# Patient Record
Sex: MALE | Race: WHITE | NOT HISPANIC OR LATINO | Employment: UNEMPLOYED | URBAN - METROPOLITAN AREA
[De-identification: names, ages, dates, MRNs, and addresses within clinical notes are randomized per-mention and may not be internally consistent; named-entity substitution may affect disease eponyms.]

---

## 2017-01-16 ENCOUNTER — ALLSCRIPTS OFFICE VISIT (OUTPATIENT)
Dept: OTHER | Facility: OTHER | Age: 37
End: 2017-01-16

## 2017-05-01 DIAGNOSIS — E66.01 MORBID (SEVERE) OBESITY DUE TO EXCESS CALORIES (HCC): ICD-10-CM

## 2017-05-01 DIAGNOSIS — E78.5 HYPERLIPIDEMIA: ICD-10-CM

## 2017-05-01 DIAGNOSIS — I10 ESSENTIAL (PRIMARY) HYPERTENSION: ICD-10-CM

## 2017-05-01 DIAGNOSIS — F41.9 ANXIETY DISORDER: ICD-10-CM

## 2017-05-02 ENCOUNTER — LAB CONVERSION - ENCOUNTER (OUTPATIENT)
Dept: OTHER | Facility: OTHER | Age: 37
End: 2017-05-02

## 2017-05-02 ENCOUNTER — GENERIC CONVERSION - ENCOUNTER (OUTPATIENT)
Dept: OTHER | Facility: OTHER | Age: 37
End: 2017-05-02

## 2017-05-02 LAB
A/G RATIO (HISTORICAL): 1.4 (CALC) (ref 1–2.5)
ALBUMIN SERPL BCP-MCNC: 4.5 G/DL (ref 3.6–5.1)
ALP SERPL-CCNC: 46 U/L (ref 40–115)
ALT SERPL W P-5'-P-CCNC: 18 U/L (ref 9–46)
AST SERPL W P-5'-P-CCNC: 18 U/L (ref 10–40)
BILIRUB SERPL-MCNC: 0.5 MG/DL (ref 0.2–1.2)
BUN SERPL-MCNC: 14 MG/DL (ref 7–25)
BUN/CREA RATIO (HISTORICAL): NORMAL (CALC) (ref 6–22)
CALCIUM SERPL-MCNC: 9.4 MG/DL (ref 8.6–10.3)
CHLORIDE SERPL-SCNC: 100 MMOL/L (ref 98–110)
CHOLEST SERPL-MCNC: 134 MG/DL (ref 125–200)
CHOLEST/HDLC SERPL: 4.8 (CALC)
CO2 SERPL-SCNC: 27 MMOL/L (ref 20–31)
CREAT SERPL-MCNC: 0.75 MG/DL (ref 0.6–1.35)
DEPRECATED RDW RBC AUTO: 14.2 % (ref 11–15)
EGFR AFRICAN AMERICAN (HISTORICAL): 135 ML/MIN/1.73M2
EGFR-AMERICAN CALC (HISTORICAL): 116 ML/MIN/1.73M2
GAMMA GLOBULIN (HISTORICAL): 3.2 G/DL (CALC) (ref 1.9–3.7)
GLUCOSE (HISTORICAL): 83 MG/DL (ref 65–99)
HCT VFR BLD AUTO: 45.8 % (ref 38.5–50)
HDLC SERPL-MCNC: 28 MG/DL
HGB BLD-MCNC: 14.9 G/DL (ref 13.2–17.1)
LDL CHOLESTEROL (HISTORICAL): 57 MG/DL (CALC)
MCH RBC QN AUTO: 27.9 PG (ref 27–33)
MCHC RBC AUTO-ENTMCNC: 32.5 G/DL (ref 32–36)
MCV RBC AUTO: 86.1 FL (ref 80–100)
NON-HDL-CHOL (CHOL-HDL) (HISTORICAL): 106 MG/DL (CALC)
PLATELET # BLD AUTO: 233 THOUSAND/UL (ref 140–400)
PMV BLD AUTO: 8.8 FL (ref 7.5–12.5)
POTASSIUM SERPL-SCNC: 4 MMOL/L (ref 3.5–5.3)
RBC # BLD AUTO: 5.32 MILLION/UL (ref 4.2–5.8)
SODIUM SERPL-SCNC: 136 MMOL/L (ref 135–146)
TOTAL PROTEIN (HISTORICAL): 7.7 G/DL (ref 6.1–8.1)
TRIGL SERPL-MCNC: 247 MG/DL
TSH SERPL DL<=0.05 MIU/L-ACNC: 1.31 MIU/L (ref 0.4–4.5)
WBC # BLD AUTO: 11.3 THOUSAND/UL (ref 3.8–10.8)

## 2017-07-31 ENCOUNTER — ALLSCRIPTS OFFICE VISIT (OUTPATIENT)
Dept: OTHER | Facility: OTHER | Age: 37
End: 2017-07-31

## 2017-11-10 ENCOUNTER — ALLSCRIPTS OFFICE VISIT (OUTPATIENT)
Dept: OTHER | Facility: OTHER | Age: 37
End: 2017-11-10

## 2017-11-10 ENCOUNTER — GENERIC CONVERSION - ENCOUNTER (OUTPATIENT)
Dept: OTHER | Facility: OTHER | Age: 37
End: 2017-11-10

## 2017-11-10 DIAGNOSIS — M45.3 ANKYLOSING SPONDYLITIS OF CERVICOTHORACIC REGION (HCC): ICD-10-CM

## 2017-11-10 DIAGNOSIS — M79.10 MYALGIA: ICD-10-CM

## 2017-11-10 DIAGNOSIS — M43.6 TORTICOLLIS: ICD-10-CM

## 2017-11-17 ENCOUNTER — LAB CONVERSION - ENCOUNTER (OUTPATIENT)
Dept: OTHER | Facility: OTHER | Age: 37
End: 2017-11-17

## 2017-11-17 LAB
25(OH)D3 SERPL-MCNC: 11 NG/ML (ref 30–100)
ALDOLASE (HISTORICAL): 5.5 U/L
ANA PATTERN 1 (HISTORICAL): ABNORMAL
ANA TITER 1 (HISTORICAL): ABNORMAL TITER
ANTI-NUCLEAR ANTIBODY (ANA) (HISTORICAL): POSITIVE
C-REACTIVE PROTEIN (HISTORICAL): 21.4 MG/L
CK SERPL-CCNC: 197 U/L (ref 44–196)
ERYTHROCYTE SEDIMENTATION RATE (HISTORICAL): 22 MM/H
HLA B27 (HISTORICAL): POSITIVE
LYME IGG/IGM AB (HISTORICAL): <0.9 INDEX
RHEUMATOID FACTOR (HISTORICAL): <14 IU/ML

## 2017-11-21 ENCOUNTER — GENERIC CONVERSION - ENCOUNTER (OUTPATIENT)
Dept: OTHER | Facility: OTHER | Age: 37
End: 2017-11-21

## 2017-11-22 ENCOUNTER — GENERIC CONVERSION - ENCOUNTER (OUTPATIENT)
Dept: OTHER | Facility: OTHER | Age: 37
End: 2017-11-22

## 2017-11-25 ENCOUNTER — APPOINTMENT (OUTPATIENT)
Dept: RADIOLOGY | Facility: MEDICAL CENTER | Age: 37
End: 2017-11-25
Payer: COMMERCIAL

## 2017-11-25 DIAGNOSIS — M43.6 TORTICOLLIS: ICD-10-CM

## 2017-11-25 PROCEDURE — 72110 X-RAY EXAM L-2 SPINE 4/>VWS: CPT

## 2017-11-29 ENCOUNTER — GENERIC CONVERSION - ENCOUNTER (OUTPATIENT)
Dept: OTHER | Facility: OTHER | Age: 37
End: 2017-11-29

## 2017-12-05 ENCOUNTER — ALLSCRIPTS OFFICE VISIT (OUTPATIENT)
Dept: OTHER | Facility: OTHER | Age: 37
End: 2017-12-05

## 2017-12-14 ENCOUNTER — ALLSCRIPTS OFFICE VISIT (OUTPATIENT)
Dept: OTHER | Facility: OTHER | Age: 37
End: 2017-12-14

## 2018-01-10 NOTE — MISCELLANEOUS
To Whom It May Concern:    Please excuse Isidoro Gottron from jury duty for the next six months  He has chronic anxiety issues and has also been dealing with neck problems as well  Please contact me with any questions  Electronically signed by:Viral CASEY    Mar 21 2016 11:14AM EST

## 2018-01-12 VITALS
WEIGHT: 315 LBS | DIASTOLIC BLOOD PRESSURE: 88 MMHG | SYSTOLIC BLOOD PRESSURE: 148 MMHG | OXYGEN SATURATION: 99 % | HEART RATE: 115 BPM | TEMPERATURE: 97.4 F | BODY MASS INDEX: 52.75 KG/M2

## 2018-01-12 NOTE — MISCELLANEOUS
Message  Called pt  at 1402 hrs   has Vit D 0f 11 will start on Rx Vit D   has yet top get x rays but will do this weekend   he is to keep fu 1st week Dec       Plan  Avitaminosis D    · Vitamin D (Ergocalciferol) 51558 UNIT Oral Capsule; one weekly till finished    Signatures   Electronically signed by : Mikaela Robledo DO; Nov 22 2017  2:08PM EST                       (Author)

## 2018-01-12 NOTE — RESULT NOTES
Verified Results  (Q) CBC (H/H, RBC, INDICES, WBC, PLT) [id number] 07:02AM Bimal Isai     Test Name Result Flag Reference   WHITE BLOOD CELL COUNT 10 4 Thousand/uL  3 8-10 8   RED BLOOD CELL COUNT 5 26 Million/uL  4 20-5 80   HEMOGLOBIN 14 9 g/dL  13 2-17 1   HEMATOCRIT 46 4 %  38 5-50 0   MCV 88 3 fL  80 0-100 0   MCH 28 4 pg  27 0-33 0   MCHC 32 2 g/dL  32 0-36 0   RDW 14 0 %  11 0-15 0   PLATELET COUNT 464 Thousand/uL  140-400   MPV 8 5 fL  7 5-11 5     (1) COMPREHENSIVE METABOLIC PANEL [id number] 10:76SI Goleta Isai     Test Name Result Flag Reference   GLUCOSE 85 mg/dL  65-99   Fasting reference interval   UREA NITROGEN (BUN) 17 mg/dL  7-25   CREATININE 0 71 mg/dL  0 60-1 35   eGFR NON-AFR  AMERICAN 120 mL/min/1 73m2  > OR = 60   eGFR AFRICAN AMERICAN 139 mL/min/1 73m2  > OR = 60   BUN/CREATININE RATIO   5-67   NOT APPLICABLE (calc)   SODIUM 137 mmol/L  135-146   POTASSIUM 4 5 mmol/L  3 5-5 3   CHLORIDE 100 mmol/L     CARBON DIOXIDE 28 mmol/L  20-31   CALCIUM 9 7 mg/dL  8 6-10 3   PROTEIN, TOTAL 7 7 g/dL  6 1-8 1   ALBUMIN 4 6 g/dL  3 6-5 1   GLOBULIN 3 1 g/dL (calc)  1 9-3 7   ALBUMIN/GLOBULIN RATIO 1 5 (calc)  1 0-2 5   BILIRUBIN, TOTAL 0 5 mg/dL  0 2-1 2   ALKALINE PHOSPHATASE 36 U/L L    AST 19 U/L  10-40   ALT 21 U/L  9-46     (Q) LIPID PANEL WITH REFLEX TO DIRECT LDL [id number] 07:02AM Goleta Isai     Test Name Result Flag Reference   CHOLESTEROL, TOTAL 143 mg/dL  125-200   HDL CHOLESTEROL 33 mg/dL L > OR = 40   TRIGLICERIDES 787 mg/dL H <150   LDL-CHOLESTEROL 72 mg/dL (calc)  <130   Desirable range <100 mg/dL for patients with CHD or  diabetes and <70 mg/dL for diabetic patients with  known heart disease  CHOL/HDLC RATIO 4 3 (calc)  < OR = 5 0   NON HDL CHOLESTEROL 110 mg/dL (calc)     Target for non-HDL cholesterol is 30 mg/dL higher than   LDL cholesterol target       (Q) TSH, 3RD GENERATION W/REFLEX TO FT4 [id number] 07:02AM Bimal Alex     Test Name Result Flag Reference   TSH W/REFLEX TO FT4 2 55 mIU/L  0 40-4 50     (Q) URINALYSIS REFLEX [id number] 07:02AM Juan Bogdan   REPORT COMMENT:  FASTING:YES     Test Name Result Flag Reference   COLOR YELLOW  YELLOW   APPEARANCE CLEAR  CLEAR   SPECIFIC GRAVITY 1 025  1 001-1 035   PH 6 0  5 0-8 0   GLUCOSE NEGATIVE  NEGATIVE   BILIRUBIN NEGATIVE  NEGATIVE   KETONES NEGATIVE  NEGATIVE   OCCULT BLOOD 1+ A NEGATIVE   PROTEIN NEGATIVE  NEGATIVE   NITRITE NEGATIVE  NEGATIVE   LEUKOCYTE ESTERASE NEGATIVE  NEGATIVE   WBC 0-5 /HPF  < OR = 5   RBC 0-2 /HPF  < OR = 2   SQUAMOUS EPITHELIAL CELLS 0-5 /HPF  < OR = 5   BACTERIA NONE SEEN /HPF  NONE SEEN   HYALINE CAST NONE SEEN /LPF  NONE SEEN       Discussion/Summary   We'll discuss at your appointment   Dr Vina Fleischer

## 2018-01-14 VITALS
OXYGEN SATURATION: 97 % | DIASTOLIC BLOOD PRESSURE: 90 MMHG | BODY MASS INDEX: 52.66 KG/M2 | SYSTOLIC BLOOD PRESSURE: 158 MMHG | TEMPERATURE: 99.9 F | HEART RATE: 102 BPM | WEIGHT: 315 LBS

## 2018-01-15 NOTE — RESULT NOTES
Verified Results  (1) ALDOLASE [id number] 09:15AM Anup Bella     Test Name Result Flag Reference   ALDOLASE 5 5 U/L  < OR = 8 1     (1) CK (CPK) [id number] 09:15AM Anup Bella     Test Name Result Flag Reference   CREATINE KINASE, TOTAL 197 U/L H      (1) C-REACTIVE PROTEIN [id number] 09:15AM Anup Bella     Test Name Result Flag Reference   C-REACTIVE PROTEIN 21 4 mg/L H <8 0     (1) HLA B27 [id number] 09:15AM Anup Bella     Test Name Result Flag Reference   HLA-B27 ANTIGEN POSITIVE A NEGATIVE     (Q) SED RATE BY MODIFIED Martinez Tyler [id number] 09:15AM Anup Bella     Test Name Result Flag Reference   SED RATE BY MODIFIED$WESTERGREN 22 mm/h H < OR = 15     (Q) LYME DISEASE AB, TOTAL W/REFL WB (IGG, IGM) [id number] 09:15AM Anup Bella     Test Name Result Flag Reference   LYME AB SCREEN <0 90 index     Index                Interpretation                     -----                --------------                     < 0 90               Negative                     0  90-1 09            Equivocal                     > 1 09               Positive      As recommended by the Food and Drug Administration   (FDA), all samples with positive or equivocal   results in a Borrelia burgdorferi antibody screen  will be tested using a blot method  Positive or   equivocal screening test results should not be   interpreted as truly positive until verified as such   using a supplemental assay (e g , B  burgdorferi blot)  The screening test and/or blot for B  burgdorferi   antibodies may be falsely negative in early stages  of Lyme disease, including the period when erythema   migrans is apparent  (Q) NIECY SCREEN, IFA, WITH REFLEX TO TITER AND PATTERN [id number] 09:15AM Anup Bella     Test Name Result Flag Reference   NIECY SCREEN, IFA POSITIVE A NEGATIVE   NIECY IFA is a first line screen for detecting the  presence of up to approximately 150 autoantibodies in  various autoimmune diseases  A positive NIECY IFA result  is suggestive of autoimmune disease and reflexes to  titer and pattern  Further laboratory testing may be  considered if clinically indicated  Visit Physician FAQs for interpretation of all  antibodies in the [place], prevalence, and association  with diseases at http://[employer]/  [email or web address]   NIECY PATTERN NUCLEOLAR A    Nucleolar pattern is associated with systemic  sclerosis (scleroderma), systemic sclerosis/  polymyositis overlap and Sjogren's syndrome  NIECY TITER 1:40 titer H    A low level NIECY titer may be present in pre-clinical  autoimmune diseases and normal individuals  Reference Range                  <1:40        Negative                  1:40-1:80    Low Antibody Level                  >1:80        Elevated Antibody Level     (1) RF QUANTITATION [id number] 09:15AM [employer] Stephani Breaker     Test Name Result Flag Reference   RHEUMATOID FACTOR <14 IU/mL  <14     *(Q) VITAMIN D, 25-HYDROXY, LC/MS/MS [id number] 09:15AM [employer], Stephani Breaker     Test Name Result Flag Reference   VITAMIN D, 25-OH, TOTAL 11 ng/mL L    Vitamin D Status         25-OH Vitamin D:     Deficiency:                    <20 ng/mL  Insufficiency:             20 - 29 ng/mL  Optimal:                 > or = 30 ng/mL     For 25-OH Vitamin D testing on patients on   D2-supplementation and patients for whom quantitation   of D2 and D3 fractions is required, the QuestAssureD(TM)  25-OH VIT D, (D2,D3), LC/MS/MS is recommended: order   code 22645 (patients >2yrs)  For more information on this test, go to:  http://[employer]/faq/NLG619  (This link is being provided for   informational/educational purposes only )

## 2018-01-15 NOTE — RESULT NOTES
Verified Results  * XR SPINE LUMBAR MINIMUM 4 VIEWS NON INJURY [id number] 09:23AM Delon Yun Order Number: [id number]     Test Name Result Flag Reference   XR SPINE LUMBAR MINIMUM 4 VIEWS (Report)     LUMBAR SPINE     INDICATION: Neck pain and torticollis  COMPARISON: None     VIEWS: AP, lateral, bilateral oblique and coned down projections     IMAGES: 6     FINDINGS:     Image quality is limited due to the patient's size  Alignment is unremarkable  There is no radiographic evidence of acute fracture or destructive osseous lesion  No significant lumbar degenerative change noted  Visualized soft tissues appear unremarkable  IMPRESSION:     Unremarkable lumbar spine series         Workstation performed: [id number]     Signed by:   Jose Eduardo Sorensen MD   11/29/17

## 2018-01-16 NOTE — RESULT NOTES
Verified Results  (1) COMPREHENSIVE METABOLIC PANEL [id number] 21:69OQ Maxi Maddox     Test Name Result Flag Reference   GLUCOSE 83 mg/dL  65-99   Fasting reference interval   UREA NITROGEN (BUN) 14 mg/dL  7-25   CREATININE 0 75 mg/dL  0 60-1 35   eGFR NON-AFR  AMERICAN 116 mL/min/1 73m2  > OR = 60   eGFR AFRICAN AMERICAN 135 mL/min/1 73m2  > OR = 60   BUN/CREATININE RATIO   0-37   NOT APPLICABLE (calc)   SODIUM 136 mmol/L  135-146   POTASSIUM 4 0 mmol/L  3 5-5 3   CHLORIDE 100 mmol/L     CARBON DIOXIDE 27 mmol/L  20-31   CALCIUM 9 4 mg/dL  8 6-10 3   PROTEIN, TOTAL 7 7 g/dL  6 1-8 1   ALBUMIN 4 5 g/dL  3 6-5 1   GLOBULIN 3 2 g/dL (calc)  1 9-3 7   ALBUMIN/GLOBULIN RATIO 1 4 (calc)  1 0-2 5   BILIRUBIN, TOTAL 0 5 mg/dL  0 2-1 2   ALKALINE PHOSPHATASE 46 U/L     AST 18 U/L  10-40   ALT 18 U/L  9-46     (1) LIPID PANEL, FASTING [id number] 07:05AM Kelly Christian     Test Name Result Flag Reference   CHOLESTEROL, TOTAL 134 mg/dL  125-200   HDL CHOLESTEROL 28 mg/dL L > OR = 40   TRIGLICERIDES 287 mg/dL H <150   LDL-CHOLESTEROL 57 mg/dL (calc)  <130   Desirable range <100 mg/dL for patients with CHD or  diabetes and <70 mg/dL for diabetic patients with  known heart disease  CHOL/HDLC RATIO 4 8 (calc)  < OR = 5 0   NON HDL CHOLESTEROL 106 mg/dL (calc)     Target for non-HDL cholesterol is 30 mg/dL higher than   LDL cholesterol target       (Q) CBC (H/H, RBC, INDICES, WBC, PLT) [id number] 07:05AM Kelly Christian     Test Name Result Flag Reference   WHITE BLOOD CELL COUNT 11 3 Thousand/uL H 3 8-10 8   RED BLOOD CELL COUNT 5 32 Million/uL  4 20-5 80   HEMOGLOBIN 14 9 g/dL  13 2-17 1   HEMATOCRIT 45 8 %  38 5-50 0   MCV 86 1 fL  80 0-100 0   MCH 27 9 pg  27 0-33 0   MCHC 32 5 g/dL  32 0-36 0   RDW 14 2 %  11 0-15 0   PLATELET COUNT 212 Thousand/uL  140-400   MPV 8 8 fL  7 5-12 5     (Q) TSH, 3RD GENERATION W/REFLEX TO FT4 [id number] 07:05AM Kelly Christian   REPORT COMMENT:  FASTING:YES     Test Name Result Flag Reference   TSH W/REFLEX TO FT4 1 31 mIU/L  0 40-4 50

## 2018-01-22 VITALS
DIASTOLIC BLOOD PRESSURE: 80 MMHG | HEART RATE: 88 BPM | SYSTOLIC BLOOD PRESSURE: 150 MMHG | WEIGHT: 315 LBS | HEIGHT: 70 IN | BODY MASS INDEX: 45.1 KG/M2

## 2018-01-22 VITALS
SYSTOLIC BLOOD PRESSURE: 162 MMHG | WEIGHT: 315 LBS | TEMPERATURE: 100.1 F | DIASTOLIC BLOOD PRESSURE: 90 MMHG | OXYGEN SATURATION: 100 % | BODY MASS INDEX: 51.13 KG/M2 | HEART RATE: 78 BPM

## 2018-01-23 VITALS
SYSTOLIC BLOOD PRESSURE: 150 MMHG | HEART RATE: 88 BPM | BODY MASS INDEX: 45.1 KG/M2 | HEIGHT: 70 IN | DIASTOLIC BLOOD PRESSURE: 70 MMHG | WEIGHT: 315 LBS

## 2018-01-23 NOTE — MISCELLANEOUS
RE: Davida Adhikari,  1980      To whom it may concern,    I am writing on behalf of my patient, Davida Adhikari, and his request for [employer] services  Liseth Brody is a pleasant young man who suffers from severe  depression, ankylosing spondylitis, severe chronic neck pain, and torticollis  He has been under my care for the last 15 months for these conditions  Unfortunately, Liseth Brody has failed to successfully addressed these issues despite seeing multiple specialists  Inability to move his neck has severely compromised his functional status and makes him a good candidate for your services  Lastly, Mr Ta Elizabeth needs your services in order to attend his medical appointments to address his chronic issues  Without had  this transportation to and from his appointments, his health sure to suffer  Please contact me if you have any additional questions or concerns  Thank you in advance for your consideration in this matter        Sincerely,           Nessa Ponce DO      Electronically signed Eulogio Granda DO  Dec 15 2017  4:02PM EST

## 2018-01-23 NOTE — CONSULTS
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)     Possible true torticollis ( a movement D/O) vs disuse atrophy with adaptive shortening of platysma, SCMs and postural dysfunction of C/T spine  Father has AS so eval for spondyloarthropathy  Plan  Ankylosing spondylitis of cervicothoracic region    · (1) HLA B27; Status:Active; Requested for:10Nov2017; 1   Contracture of neck    · (1) ALDOLASE; Status:Active; Requested for:10Nov2017;    · (1) NIECY SCREEN W/REFLEX TO TITER/PATTERN; Status:Active; Requested  for:10Nov2017;    · (1) CK (CPK); Status:Active; Requested for:10Nov2017;    · (1) C-REACTIVE PROTEIN; Status:Active; Requested for:10Nov2017;    · (1) LYME ANTIBODY PROFILE W/REFLEX TO WESTERN BLOT; Status:Active; Requested  for:10Nov2017;    · (1) RHEUMATOID FACTOR SCREEN; Status:Active; Requested for:10Nov2017;    · (1) SED RATE; Status:Active; Requested for:10Nov2017;    · * XR SPINE LUMBAR MINIMUM 4 VIEWS NON INJURY; Status:Active; Requested  for:10Nov2017;    · Follow-up visit in 3 weeks Evaluation and Treatment  Follow-up  Status: Hold For -  Scheduling  Requested for: [id number]  Myalgia    · (1) VITAMIN D 25-HYDROXY; Status:Active; Requested for:10Nov2017;      1 Amended By: Kelvin Todd; Nov 10 2017 11:05 AM EST    Discussion/Summary  The patient has the current Goals: Increase neck movement  The patent has the current Barriers: Pending further evaluation  Patient is able to Self-Care  Patient agrees and allows to involve family/caregiver in development of care plan:   Impression: neck pain  Currently, the condition is unchanged  The diagnostic plan includes cervical spine x-rays and blood tsts  There are no changes in medication management   Treatment plan includes Hold off on physical therapy for now until tests received  Patient discussion: discussed with the patient  Chief Complaint  11/10/17 PCP consult for torticollis  History of Present Illness  11/10/17 IE: 44 yo male with 4 years of neck pain referred by PCP this past July  He states he is here for "alternatives" to PTx as he is fearful of injury   had IE once Aug 2016   neck was "not touched" and did not have a painful experience  Onset 2013 (see intake for "approx " time frame) Relates in bed can move head to left but cannot when sitting upright   "never" moves head to right  Last imaging 2013  Annmariee Nissen Karrie Nissen plain films   has never seen Neurology or had Botox   had trialed cyclobenzaprine and D/Cd due to "not feeling right" and "aggressive" Has not worked since 2003 (age 23) mostly watches TV   finished a BA in History  Has worked at odd jobs  He does NOT drive  Lives with father  Does NOT describe significant jaja but more dull ache or stiffness    Karrie Nissen he relates onset to moving mother out 2013  No prominent LBP  Review of Systems    Constitutional: recent 27 # / year  lb weight gain, but no recent weight loss  Eyes: No complaints of red eyes, no eyesight problems  Cardiovascular: No complaints of chest pain, no palpitations, no leg claudication or lower extremity edema  Respiratory: No complaints of shortness of breath, no wheezing, no cough  Gastrointestinal: No complaints of abdominal pain, no constipation, no nausea or vomiting, no diarrhea or bloody stools  Musculoskeletal: as noted in HPI  Neurological: no numbness and no tingling  Psychiatric: no meds and anxiety  Active Problems   1  Acute non-recurrent pansinusitis (461 8) (J01 40)  2  Allergic rhinitis (477 9) (J30 9)  3  Anxiety disorder (300 00) (F41 9)  4  Benign essential hypertension (401 1) (I10)  5  Cervicalgia (723 1) (M54 2)  6  Hyperlipidemia (272 4) (E78 5)  7  Morbid or severe obesity due to excess calories (278 01) (E66 01)  8  Torticollis (723 5) (M43 6)    Past Medical History   1  History of Abdominal pain, suprapubic (789 09) (R10 2)  2  History of abnormal weight loss (V13 89) (Z87 898)  3  History of acute sinusitis (V12 69) (Z87 09)  4  History of esophageal ulcer (V12 79) (Z87 19)  5  History of Microscopic hematuria (599 72) (R31 29)  6  History of Urethral Stenosis (598 9)    The active problems and past medical history were reviewed and updated today  Surgical History   1  History of Cholecystectomy Laparoscopic  2  History of Cystoscopy For Urethral Stricture    The surgical history was reviewed and updated today  Family History  Mother   1  Family history of Diabetes Mellitus (V18 0)  Father   2  Family history of Ankylosing Spondylitis  3  Family history of Diabetes Mellitus (V18 0)  4  Family history of Nephrolithiasis  Maternal Grandmother   5  Family history of Congestive Heart Failure  6  Family history of Hypertension (V17 49)  Maternal Grandfather   7  Family history of Prostate Cancer (V16 42)    The family history was reviewed and updated today  Social History    · Denied: History of Alcohol Use (History)   · Educational Level - Completed Bachelors Degree   · Former smoker (V15 82) (N01 336)   · Living With Parents   · Marital History - Single   · Unemployed  The social history was reviewed and is unchanged  Current Meds  1  Cyclobenzaprine HCl - 5 MG Oral Tablet; TAKE 1 TABLET 3 TIMES DAILY AS NEEDED; Therapy: 22GBR6315 to (Benoit Ced)  Requested for: 54IRW4106; Last   Rx:16Jan2017 Ordered  2  Lisinopril 20 MG Oral Tablet; take 1 tablet by mouth once daily; Therapy: 55Fcu8193 to (Evaluate:29Mar2018)  Requested for: 03Apr2017; Last   Rx:03Apr2017 Ordered  3  Omeprazole 20 MG Oral Capsule Delayed Release; take 1 capsule by mouth once daily; Therapy: 65ECL6183 to (Last Rx:03Apr2017)  Requested for: 03Apr2017 Ordered    The medication list was reviewed and updated today  Allergies   1  No Known Drug Allergies    Vitals  Signs   Recorded: 19ELA4475 10:26AM   Heart Rate: 88  Systolic: 315  Diastolic: 80  Height: 5 ft 10 in  Weight: 381 lb   BMI Calculated: 54 67  BSA Calculated: 2 74    Physical Exam      Thoracic Spine: Spinal alignment exhibits abnormal kyphosis  Increased kyphosis  Cervical Spine:   Special Tests:   AROM of c spine is nill in all [place]  Constitutional - General appearance: Abnormal  morbidly obese and obese, posterior pelvic tilt posture and head held flexed and turned left 25 degrees  Musculoskeletal - Gait and station: Abnormal  Gait evaluation demonstrated pect tightening, stooped posture  Neurologic - Cranial nerves: Normal  Reflexes: Abnormal  Deep tendon reflexes: 1+ right biceps, 1+ left biceps, 1+ right triceps, 1+ left triceps, 1+ left brachioradialis, 0 right patella, 0 left patella, 0 right ankle jerk, 0 left ankle jerk and Cabrales's negative  no ankle clonus on the right and no ankle clonus on the left  Eyes   Pupils and irises: Normal        Results/Data  I personally reviewed the films/images/results in the office today  My interpretation follows  X-ray Review 2013 images unremarkable except for loss of lordosis  Diagnostic Review TSH normal, no other relevant labs        Provider Comments    Father has ANKYLOSING SPONDYLITIS1        1 Amended By: Parveen Jeter; Nov 10 2017 11:05 AM EST    Future Appointments    Date/Time Provider Specialty Site   12/06/2017 09:30 AM Lamberto Kidd DO Family Medicine [facility]     Signatures   Electronically signed by : Jeferson Perez DO; Nov 10 2017 11:04AM EST                       (Author)    Electronically signed by : Jeferson Perez DO; Nov 10 2017 11:06AM EST                       (Author)    Electronically signed by : Jeferson Perez DO; Nov 10 2017 11:09AM EST                       (Author)

## 2018-01-23 NOTE — PROGRESS NOTES
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)   4  Myalgia (729 1) (M79 1)   5  Somatic dysfunction of cervical region (739 1) (M99 01)   6  NIECY positive (795 79) (R76 8)   7  CRP elevated (790 95) (R79 82)   8  HLA B27 (HLA B27 positive) (V84 89) (Z15 89)    Plan  CRP elevated, HLA B27 (HLA B27 positive)    · 2 - Hermes Rausch MD  (Rheumatology) Co-Management  Eval and treat  Status: Hold  For - Scheduling  Requested for: 65UOD1400  Care Summary provided  : Yes  HLA B27 (HLA B27 positive)    · Follow-up visit in 6 weeks Evaluation and Treatment  Follow-up  Status: Hold For -  Scheduling  Requested for: 29QWL5508    Discussion/Summary    Finish all eight Vit D doses Rheumatology consultation  The patient has the current Goals: Move neck  The patent has the current Barriers: Transportation  Patient is able to Self-Care  Patient agrees and allows to involve family/caregiver in development of care plan:      Chief Complaint  11/10/17 PCP consult for torticollis  12/5 fu for neck pain/ stiffness      History of Present Illness  11/10/17 IE: 42 yo male with 4 years of neck pain referred by PCP this past July  He states he is here for "alternatives" to PTx as he is fearful of injury   had IE once Aug 2016   neck was "not touched" and did not have a painful experience  Onset 2013 (see intake for "approx " time frame) Relates in bed can move head to left but cannot when sitting upright   "never" moves head to right  Last imaging 2013  Shelvy Mingle Shelvy Mingle plain films   has never seen Neurology or had Botox   had trialed cyclobenzaprine and D/Cd due to "not feeling right" and "aggressive" Has not worked since 2003 (age 23) mostly watches TV   finished a BA in History  Has worked at odd jobs  He does NOT drive  Lives with father  Does NOT describe significant pain but more dull ache or stiffness    Shelvy Mingle he relates onset to moving mother out 2013   No prominent LBP    12/5/17 fu after testing   has been started on Rx Vit D 11/22   today reports some "burning " in left posterior head  He still wants to avoid PTx  Review of Systems    Musculoskeletal: as noted in HPI  Psychiatric: feels tense and anxiety  Active Problems    1  Acute non-recurrent pansinusitis (461 8) (J01 40)   2  Allergic rhinitis (477 9) (J30 9)   3  Ankylosing spondylitis of cervicothoracic region (720 0) (M45 3)   4  Anxiety disorder (300 00) (F41 9)   5  Avitaminosis D (268 9) (E55 9)   6  Benign essential hypertension (401 1) (I10)   7  Cervicalgia (723 1) (M54 2)   8  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)   9  Hyperlipidemia (272 4) (E78 5)   10  Morbid or severe obesity due to excess calories (278 01) (E66 01)   11  Myalgia (729 1) (M79 1)   12  Somatic dysfunction of cervical region (739 1) (M99 01)   13  Torticollis (723 5) (M43 6)    Past Medical History    1  History of Abdominal pain, suprapubic (789 09) (R10 2)   2  History of abnormal weight loss (V13 89) (Z87 898)   3  History of acute sinusitis (V12 69) (Z87 09)   4  History of esophageal ulcer (V12 79) (Z87 19)   5  History of gastroesophageal reflux (GERD) (V12 79) (Z87 19)   6  History of hypertension (V12 59) (Z86 79)   7  History of Microscopic hematuria (599 72) (R31 29)   8  History of Urethral Stenosis (598 9)    The active problems and past medical history were reviewed and updated today  Surgical History    1  History of Cholecystectomy Laparoscopic   2  History of Cystoscopy For Urethral Stricture    The surgical history was reviewed and updated today  Family History  Mother    1  Family history of Diabetes Mellitus (V18 0)  Father    2  Family history of Ankylosing Spondylitis   3  Family history of Diabetes Mellitus (V18 0)   4  Family history of Nephrolithiasis  Maternal Grandmother    5  Family history of Congestive Heart Failure   6  Family history of Hypertension (V17 49)  Maternal Grandfather    7  Family history of Prostate Cancer (V16 42)    Social History    · Denied: History of Alcohol Use (History)   · Denied: History of Drug use   · Educational Level - Completed Bachelors Degree   · Former smoker (V15 82) (R51 080)   · Living With Parents   · Marital History - Single   · Unemployed  The social history was reviewed and is unchanged  Current Meds   1  Cyclobenzaprine HCl - 5 MG Oral Tablet; TAKE 1 TABLET 3 TIMES DAILY AS NEEDED; Therapy: 58JCK5718 to (Ramy Butt)  Requested for: 50RCX1853; Last   Rx:16Jan2017 Ordered   2  Lisinopril 20 MG Oral Tablet; take 1 tablet by mouth once daily; Therapy: 75Srp0917 to (Evaluate:29Mar2018)  Requested for: 03Apr2017; Last   Rx:03Apr2017 Ordered   3  Omeprazole 20 MG Oral Capsule Delayed Release; take 1 capsule by mouth once daily; Therapy: 54WPY0206 to (Last Rx:03Apr2017)  Requested for: 03Apr2017 Ordered   4  Vitamin D (Ergocalciferol) 10892 UNIT Oral Capsule; one weekly till finished; Therapy: 34BBE9273 to (Last Rx:22Nov2017)  Requested for: 22Nov2017 Ordered    The medication list was reviewed and updated today  Allergies    1  No Known Drug Allergies    Vitals  Signs   Recorded: 34YSQ9734 10:55AM   Heart Rate: 88  Systolic: 970  Diastolic: 70  Height: 5 ft 10 in  Weight: 384 lb   BMI Calculated: 55 1  BSA Calculated: 2 75    Physical Exam    Constitutional   General appearance: Abnormal   morbidly obese  Cervical Spine examination demonstrates  AROM of cervcical spine is about 5 degrees in flexion and extensio, RR and LR nil with eye movement only  Some TTP of traps, SCMs  , scalenes  Cervical Spine:   Evaluation of Muscle Stretch Reflexes on the right side demonstrates 1/4 Triceps Reflex, 1/4 Biceps Reflex, 1/4 Brachioradialis Reflex, 0/4 Knee Jerk Reflex and 0/4 Ankle Jerk Reflex, but negative Cabrales's Test right upper extremity     Evaluation of Muscle Stretch Reflexes on the left side demonstrates 1/4 Triceps Reflex, 1/4 Biceps Reflex, 1/4 Brachioradialis Reflex and 0/4 Knee Jerk Reflex, but negative Cabrales's Test left upper extremity  Results/Data  I personally reviewed the films/images/results in the office today  My interpretation follows  X-ray Review lumbar spine unremarkable  Diagnostic Review HLA B-27 +,  h, ESR 22,   Aldolase, Lyme, RF  all Neg  NIECY + 1:40 nucleolar  CRP 21 4 (obesity ?)  Provider Comments    Despite + labs this may not be a true "dystonic torticollis" or manifestation of AS but a neuropsychiatric phenomenon  Despite above his elevated markers deserve Rheumatology evaluation        Future Appointments    Date/Time Provider Specialty Site   12/19/2017 11:00 AM Yael Cabrera DO Family Medicine [facility]     Signatures   Electronically signed by : Octavio Pan DO; Dec  5 2017 11:24AM EST                       (Author)

## 2018-03-08 DIAGNOSIS — I10 ESSENTIAL HYPERTENSION: Primary | ICD-10-CM

## 2018-03-08 RX ORDER — LISINOPRIL 40 MG/1
40 TABLET ORAL DAILY
Qty: 30 TABLET | Refills: 3 | Status: SHIPPED | OUTPATIENT
Start: 2018-03-08

## 2018-03-08 RX ORDER — LISINOPRIL 40 MG/1
1 TABLET ORAL DAILY
COMMUNITY
Start: 2011-08-08 | End: 2018-03-08 | Stop reason: SDUPTHER

## 2018-03-26 DIAGNOSIS — K21.9 GASTROESOPHAGEAL REFLUX DISEASE WITHOUT ESOPHAGITIS: Primary | ICD-10-CM

## 2018-03-26 RX ORDER — OMEPRAZOLE 20 MG/1
1 CAPSULE, DELAYED RELEASE ORAL DAILY
COMMUNITY
Start: 2011-01-10 | End: 2018-03-26 | Stop reason: SDUPTHER

## 2018-03-27 RX ORDER — OMEPRAZOLE 20 MG/1
20 CAPSULE, DELAYED RELEASE ORAL DAILY
Qty: 30 CAPSULE | Refills: 1 | Status: SHIPPED | OUTPATIENT
Start: 2018-03-27 | End: 2018-05-25 | Stop reason: SDUPTHER

## 2018-05-25 DIAGNOSIS — K21.9 GASTROESOPHAGEAL REFLUX DISEASE WITHOUT ESOPHAGITIS: ICD-10-CM

## 2018-05-29 RX ORDER — OMEPRAZOLE 20 MG/1
20 CAPSULE, DELAYED RELEASE ORAL DAILY
Qty: 30 CAPSULE | Refills: 0 | Status: SHIPPED | OUTPATIENT
Start: 2018-05-29 | End: 2018-05-31 | Stop reason: SDUPTHER

## 2018-05-31 DIAGNOSIS — K21.9 GASTROESOPHAGEAL REFLUX DISEASE WITHOUT ESOPHAGITIS: ICD-10-CM

## 2018-05-31 RX ORDER — OMEPRAZOLE 20 MG/1
20 CAPSULE, DELAYED RELEASE ORAL DAILY
Qty: 30 CAPSULE | Refills: 3 | Status: SHIPPED | OUTPATIENT
Start: 2018-05-31

## 2021-10-19 ENCOUNTER — ANESTHESIA EVENT (INPATIENT)
Dept: PERIOP | Facility: HOSPITAL | Age: 41
DRG: 468 | End: 2021-10-19
Payer: COMMERCIAL

## 2021-10-19 ENCOUNTER — APPOINTMENT (INPATIENT)
Dept: RADIOLOGY | Facility: HOSPITAL | Age: 41
DRG: 468 | End: 2021-10-19
Payer: COMMERCIAL

## 2021-10-19 ENCOUNTER — APPOINTMENT (EMERGENCY)
Dept: CT IMAGING | Facility: HOSPITAL | Age: 41
DRG: 468 | End: 2021-10-19
Payer: COMMERCIAL

## 2021-10-19 ENCOUNTER — ANESTHESIA (INPATIENT)
Dept: PERIOP | Facility: HOSPITAL | Age: 41
DRG: 468 | End: 2021-10-19
Payer: COMMERCIAL

## 2021-10-19 ENCOUNTER — HOSPITAL ENCOUNTER (INPATIENT)
Facility: HOSPITAL | Age: 41
LOS: 1 days | Discharge: HOME HEALTH | DRG: 468 | End: 2021-10-20
Attending: EMERGENCY MEDICINE | Admitting: HOSPITALIST
Payer: COMMERCIAL

## 2021-10-19 DIAGNOSIS — R33.9 URINARY RETENTION: Primary | ICD-10-CM

## 2021-10-19 DIAGNOSIS — N35.812 OTHER STRICTURE OF BULBOUS URETHRA IN MALE: ICD-10-CM

## 2021-10-19 PROBLEM — R39.198 DIFFICULTY URINATING: Status: ACTIVE | Noted: 2021-10-19

## 2021-10-19 PROBLEM — I10 HTN (HYPERTENSION): Status: ACTIVE | Noted: 2021-10-19

## 2021-10-19 LAB
ALBUMIN SERPL BCP-MCNC: 3.7 G/DL (ref 3.5–5)
ALP SERPL-CCNC: 49 U/L (ref 46–116)
ALT SERPL W P-5'-P-CCNC: 31 U/L (ref 12–78)
ANION GAP SERPL CALCULATED.3IONS-SCNC: 12 MMOL/L (ref 4–13)
APTT PPP: 29 SECONDS (ref 23–37)
AST SERPL W P-5'-P-CCNC: 27 U/L (ref 5–45)
BACTERIA UR QL AUTO: ABNORMAL /HPF
BASOPHILS # BLD AUTO: 0.04 THOUSANDS/ΜL (ref 0–0.1)
BASOPHILS NFR BLD AUTO: 0 % (ref 0–1)
BILIRUB SERPL-MCNC: 0.35 MG/DL (ref 0.2–1)
BILIRUB UR QL STRIP: NEGATIVE
BUN SERPL-MCNC: 11 MG/DL (ref 5–25)
CALCIUM SERPL-MCNC: 9.1 MG/DL (ref 8.3–10.1)
CHLORIDE SERPL-SCNC: 103 MMOL/L (ref 100–108)
CLARITY UR: ABNORMAL
CO2 SERPL-SCNC: 26 MMOL/L (ref 21–32)
COLOR UR: ABNORMAL
CREAT SERPL-MCNC: 0.76 MG/DL (ref 0.6–1.3)
EOSINOPHIL # BLD AUTO: 0.04 THOUSAND/ΜL (ref 0–0.61)
EOSINOPHIL NFR BLD AUTO: 0 % (ref 0–6)
ERYTHROCYTE [DISTWIDTH] IN BLOOD BY AUTOMATED COUNT: 14.7 % (ref 11.6–15.1)
GFR SERPL CREATININE-BSD FRML MDRD: 113 ML/MIN/1.73SQ M
GLUCOSE SERPL-MCNC: 99 MG/DL (ref 65–140)
GLUCOSE UR STRIP-MCNC: NEGATIVE MG/DL
HCT VFR BLD AUTO: 42.5 % (ref 36.5–49.3)
HGB BLD-MCNC: 13.4 G/DL (ref 12–17)
HGB UR QL STRIP.AUTO: ABNORMAL
IMM GRANULOCYTES # BLD AUTO: 0.07 THOUSAND/UL (ref 0–0.2)
IMM GRANULOCYTES NFR BLD AUTO: 1 % (ref 0–2)
INR PPP: 1.04 (ref 0.84–1.19)
KETONES UR STRIP-MCNC: NEGATIVE MG/DL
LACTATE SERPL-SCNC: 2 MMOL/L (ref 0.5–2)
LEUKOCYTE ESTERASE UR QL STRIP: ABNORMAL
LIPASE SERPL-CCNC: 93 U/L (ref 73–393)
LYMPHOCYTES # BLD AUTO: 1.21 THOUSANDS/ΜL (ref 0.6–4.47)
LYMPHOCYTES NFR BLD AUTO: 10 % (ref 14–44)
MCH RBC QN AUTO: 27.4 PG (ref 26.8–34.3)
MCHC RBC AUTO-ENTMCNC: 31.5 G/DL (ref 31.4–37.4)
MCV RBC AUTO: 87 FL (ref 82–98)
MONOCYTES # BLD AUTO: 0.72 THOUSAND/ΜL (ref 0.17–1.22)
MONOCYTES NFR BLD AUTO: 6 % (ref 4–12)
NEUTROPHILS # BLD AUTO: 10 THOUSANDS/ΜL (ref 1.85–7.62)
NEUTS SEG NFR BLD AUTO: 83 % (ref 43–75)
NITRITE UR QL STRIP: POSITIVE
NON-SQ EPI CELLS URNS QL MICRO: ABNORMAL /HPF
NRBC BLD AUTO-RTO: 0 /100 WBCS
PH UR STRIP.AUTO: 5 [PH]
PLATELET # BLD AUTO: 208 THOUSANDS/UL (ref 149–390)
PLATELET # BLD AUTO: 247 THOUSANDS/UL (ref 149–390)
PMV BLD AUTO: 10 FL (ref 8.9–12.7)
PMV BLD AUTO: 9.8 FL (ref 8.9–12.7)
POTASSIUM SERPL-SCNC: 4 MMOL/L (ref 3.5–5.3)
PROCALCITONIN SERPL-MCNC: <0.05 NG/ML
PROT SERPL-MCNC: 7.8 G/DL (ref 6.4–8.2)
PROT UR STRIP-MCNC: ABNORMAL MG/DL
PROTHROMBIN TIME: 13.6 SECONDS (ref 11.6–14.5)
RBC # BLD AUTO: 4.89 MILLION/UL (ref 3.88–5.62)
RBC #/AREA URNS AUTO: ABNORMAL /HPF
SODIUM SERPL-SCNC: 141 MMOL/L (ref 136–145)
SP GR UR STRIP.AUTO: 1.02 (ref 1–1.03)
UROBILINOGEN UR QL STRIP.AUTO: 0.2 E.U./DL
WBC # BLD AUTO: 12.08 THOUSAND/UL (ref 4.31–10.16)
WBC #/AREA URNS AUTO: ABNORMAL /HPF

## 2021-10-19 PROCEDURE — 96365 THER/PROPH/DIAG IV INF INIT: CPT

## 2021-10-19 PROCEDURE — 52500 TRURL RESECTION BLADDER NECK: CPT | Performed by: UROLOGY

## 2021-10-19 PROCEDURE — 85049 AUTOMATED PLATELET COUNT: CPT | Performed by: UROLOGY

## 2021-10-19 PROCEDURE — 99223 1ST HOSP IP/OBS HIGH 75: CPT | Performed by: UROLOGY

## 2021-10-19 PROCEDURE — 72170 X-RAY EXAM OF PELVIS: CPT

## 2021-10-19 PROCEDURE — 85610 PROTHROMBIN TIME: CPT

## 2021-10-19 PROCEDURE — 85025 COMPLETE CBC W/AUTO DIFF WBC: CPT | Performed by: EMERGENCY MEDICINE

## 2021-10-19 PROCEDURE — 85730 THROMBOPLASTIN TIME PARTIAL: CPT

## 2021-10-19 PROCEDURE — 74176 CT ABD & PELVIS W/O CONTRAST: CPT

## 2021-10-19 PROCEDURE — 83605 ASSAY OF LACTIC ACID: CPT

## 2021-10-19 PROCEDURE — 80053 COMPREHEN METABOLIC PANEL: CPT | Performed by: EMERGENCY MEDICINE

## 2021-10-19 PROCEDURE — 87086 URINE CULTURE/COLONY COUNT: CPT | Performed by: UROLOGY

## 2021-10-19 PROCEDURE — C1769 GUIDE WIRE: HCPCS | Performed by: UROLOGY

## 2021-10-19 PROCEDURE — 81001 URINALYSIS AUTO W/SCOPE: CPT | Performed by: EMERGENCY MEDICINE

## 2021-10-19 PROCEDURE — 99285 EMERGENCY DEPT VISIT HI MDM: CPT

## 2021-10-19 PROCEDURE — 0T7D8ZZ DILATION OF URETHRA, VIA NATURAL OR ARTIFICIAL OPENING ENDOSCOPIC: ICD-10-PCS | Performed by: UROLOGY

## 2021-10-19 PROCEDURE — 51702 INSERT TEMP BLADDER CATH: CPT | Performed by: EMERGENCY MEDICINE

## 2021-10-19 PROCEDURE — 83690 ASSAY OF LIPASE: CPT | Performed by: EMERGENCY MEDICINE

## 2021-10-19 PROCEDURE — 96366 THER/PROPH/DIAG IV INF ADDON: CPT

## 2021-10-19 PROCEDURE — G1004 CDSM NDSC: HCPCS

## 2021-10-19 PROCEDURE — 87040 BLOOD CULTURE FOR BACTERIA: CPT

## 2021-10-19 PROCEDURE — 84145 PROCALCITONIN (PCT): CPT

## 2021-10-19 PROCEDURE — 99285 EMERGENCY DEPT VISIT HI MDM: CPT | Performed by: EMERGENCY MEDICINE

## 2021-10-19 PROCEDURE — 36415 COLL VENOUS BLD VENIPUNCTURE: CPT | Performed by: EMERGENCY MEDICINE

## 2021-10-19 RX ORDER — SODIUM CHLORIDE, SODIUM LACTATE, POTASSIUM CHLORIDE, CALCIUM CHLORIDE 600; 310; 30; 20 MG/100ML; MG/100ML; MG/100ML; MG/100ML
125 INJECTION, SOLUTION INTRAVENOUS CONTINUOUS
Status: DISCONTINUED | OUTPATIENT
Start: 2021-10-19 | End: 2021-10-20 | Stop reason: HOSPADM

## 2021-10-19 RX ORDER — ONDANSETRON 2 MG/ML
4 INJECTION INTRAMUSCULAR; INTRAVENOUS EVERY 6 HOURS PRN
Status: DISCONTINUED | OUTPATIENT
Start: 2021-10-19 | End: 2021-10-20 | Stop reason: HOSPADM

## 2021-10-19 RX ORDER — OXYBUTYNIN CHLORIDE 5 MG/1
10 TABLET ORAL EVERY 6 HOURS PRN
Status: DISCONTINUED | OUTPATIENT
Start: 2021-10-19 | End: 2021-10-20 | Stop reason: HOSPADM

## 2021-10-19 RX ORDER — LISINOPRIL 20 MG/1
40 TABLET ORAL DAILY
Status: DISCONTINUED | OUTPATIENT
Start: 2021-10-20 | End: 2021-10-20 | Stop reason: HOSPADM

## 2021-10-19 RX ORDER — KETOROLAC TROMETHAMINE 30 MG/ML
30 INJECTION, SOLUTION INTRAMUSCULAR; INTRAVENOUS EVERY 6 HOURS SCHEDULED
Status: DISCONTINUED | OUTPATIENT
Start: 2021-10-20 | End: 2021-10-20 | Stop reason: HOSPADM

## 2021-10-19 RX ORDER — LIDOCAINE HYDROCHLORIDE 10 MG/ML
INJECTION, SOLUTION EPIDURAL; INFILTRATION; INTRACAUDAL; PERINEURAL AS NEEDED
Status: DISCONTINUED | OUTPATIENT
Start: 2021-10-19 | End: 2021-10-19

## 2021-10-19 RX ORDER — MAGNESIUM HYDROXIDE 1200 MG/15ML
LIQUID ORAL AS NEEDED
Status: DISCONTINUED | OUTPATIENT
Start: 2021-10-19 | End: 2021-10-19 | Stop reason: HOSPADM

## 2021-10-19 RX ORDER — CIPROFLOXACIN 2 MG/ML
400 INJECTION, SOLUTION INTRAVENOUS EVERY 12 HOURS SCHEDULED
Status: COMPLETED | OUTPATIENT
Start: 2021-10-19 | End: 2021-10-20

## 2021-10-19 RX ORDER — PANTOPRAZOLE SODIUM 20 MG/1
20 TABLET, DELAYED RELEASE ORAL
Status: DISCONTINUED | OUTPATIENT
Start: 2021-10-20 | End: 2021-10-20 | Stop reason: HOSPADM

## 2021-10-19 RX ORDER — SODIUM CHLORIDE, SODIUM LACTATE, POTASSIUM CHLORIDE, CALCIUM CHLORIDE 600; 310; 30; 20 MG/100ML; MG/100ML; MG/100ML; MG/100ML
INJECTION, SOLUTION INTRAVENOUS CONTINUOUS PRN
Status: DISCONTINUED | OUTPATIENT
Start: 2021-10-19 | End: 2021-10-19

## 2021-10-19 RX ORDER — PROPOFOL 10 MG/ML
INJECTION, EMULSION INTRAVENOUS AS NEEDED
Status: DISCONTINUED | OUTPATIENT
Start: 2021-10-19 | End: 2021-10-19

## 2021-10-19 RX ORDER — LIDOCAINE HYDROCHLORIDE 20 MG/ML
1 JELLY TOPICAL ONCE
Status: COMPLETED | OUTPATIENT
Start: 2021-10-19 | End: 2021-10-19

## 2021-10-19 RX ORDER — LIDOCAINE HYDROCHLORIDE 20 MG/ML
JELLY TOPICAL
Status: COMPLETED
Start: 2021-10-19 | End: 2021-10-19

## 2021-10-19 RX ORDER — OXYCODONE HYDROCHLORIDE 5 MG/1
5 TABLET ORAL EVERY 4 HOURS PRN
Status: DISCONTINUED | OUTPATIENT
Start: 2021-10-19 | End: 2021-10-20 | Stop reason: HOSPADM

## 2021-10-19 RX ORDER — ONDANSETRON 2 MG/ML
INJECTION INTRAMUSCULAR; INTRAVENOUS AS NEEDED
Status: DISCONTINUED | OUTPATIENT
Start: 2021-10-19 | End: 2021-10-19

## 2021-10-19 RX ORDER — OXYCODONE HYDROCHLORIDE 5 MG/1
2.5 TABLET ORAL EVERY 4 HOURS PRN
Status: DISCONTINUED | OUTPATIENT
Start: 2021-10-19 | End: 2021-10-20 | Stop reason: HOSPADM

## 2021-10-19 RX ORDER — ATROPA BELLADONNA AND OPIUM 16.2; 3 MG/1; MG/1
1 SUPPOSITORY RECTAL EVERY 6 HOURS PRN
Status: DISCONTINUED | OUTPATIENT
Start: 2021-10-19 | End: 2021-10-20 | Stop reason: HOSPADM

## 2021-10-19 RX ORDER — FENTANYL CITRATE 50 UG/ML
INJECTION, SOLUTION INTRAMUSCULAR; INTRAVENOUS AS NEEDED
Status: DISCONTINUED | OUTPATIENT
Start: 2021-10-19 | End: 2021-10-19

## 2021-10-19 RX ORDER — BACITRACIN, NEOMYCIN, POLYMYXIN B 400; 3.5; 5 [USP'U]/G; MG/G; [USP'U]/G
1 OINTMENT TOPICAL 2 TIMES DAILY
Status: DISCONTINUED | OUTPATIENT
Start: 2021-10-19 | End: 2021-10-20 | Stop reason: HOSPADM

## 2021-10-19 RX ORDER — METOCLOPRAMIDE HYDROCHLORIDE 5 MG/ML
10 INJECTION INTRAMUSCULAR; INTRAVENOUS ONCE AS NEEDED
Status: DISCONTINUED | OUTPATIENT
Start: 2021-10-19 | End: 2021-10-19 | Stop reason: HOSPADM

## 2021-10-19 RX ORDER — KETAMINE HYDROCHLORIDE 50 MG/ML
INJECTION, SOLUTION, CONCENTRATE INTRAMUSCULAR; INTRAVENOUS AS NEEDED
Status: DISCONTINUED | OUTPATIENT
Start: 2021-10-19 | End: 2021-10-19

## 2021-10-19 RX ORDER — ONDANSETRON 2 MG/ML
4 INJECTION INTRAMUSCULAR; INTRAVENOUS ONCE AS NEEDED
Status: DISCONTINUED | OUTPATIENT
Start: 2021-10-19 | End: 2021-10-19 | Stop reason: HOSPADM

## 2021-10-19 RX ORDER — CEFAZOLIN SODIUM 1 G/3ML
INJECTION, POWDER, FOR SOLUTION INTRAMUSCULAR; INTRAVENOUS AS NEEDED
Status: DISCONTINUED | OUTPATIENT
Start: 2021-10-19 | End: 2021-10-19

## 2021-10-19 RX ORDER — ACETAMINOPHEN 325 MG/1
650 TABLET ORAL EVERY 6 HOURS SCHEDULED
Status: DISCONTINUED | OUTPATIENT
Start: 2021-10-20 | End: 2021-10-20 | Stop reason: HOSPADM

## 2021-10-19 RX ORDER — SUCCINYLCHOLINE/SOD CL,ISO/PF 100 MG/5ML
SYRINGE (ML) INTRAVENOUS AS NEEDED
Status: DISCONTINUED | OUTPATIENT
Start: 2021-10-19 | End: 2021-10-19

## 2021-10-19 RX ORDER — DOCUSATE SODIUM 100 MG/1
100 CAPSULE, LIQUID FILLED ORAL 2 TIMES DAILY
Status: DISCONTINUED | OUTPATIENT
Start: 2021-10-19 | End: 2021-10-20 | Stop reason: HOSPADM

## 2021-10-19 RX ADMIN — SODIUM CHLORIDE, SODIUM LACTATE, POTASSIUM CHLORIDE, AND CALCIUM CHLORIDE 125 ML/HR: .6; .31; .03; .02 INJECTION, SOLUTION INTRAVENOUS at 21:26

## 2021-10-19 RX ADMIN — ONDANSETRON 4 MG: 2 INJECTION INTRAMUSCULAR; INTRAVENOUS at 16:25

## 2021-10-19 RX ADMIN — LIDOCAINE HYDROCHLORIDE 50 MG: 10 INJECTION, SOLUTION EPIDURAL; INFILTRATION; INTRACAUDAL at 16:15

## 2021-10-19 RX ADMIN — CEFTRIAXONE SODIUM 1000 MG: 10 INJECTION, POWDER, FOR SOLUTION INTRAVENOUS at 13:14

## 2021-10-19 RX ADMIN — PROPOFOL 350 MG: 10 INJECTION, EMULSION INTRAVENOUS at 16:15

## 2021-10-19 RX ADMIN — Medication 200 MG: at 16:15

## 2021-10-19 RX ADMIN — SODIUM CHLORIDE, SODIUM LACTATE, POTASSIUM CHLORIDE, AND CALCIUM CHLORIDE: .6; .31; .03; .02 INJECTION, SOLUTION INTRAVENOUS at 16:11

## 2021-10-19 RX ADMIN — KETAMINE HYDROCHLORIDE 30 MG: 50 INJECTION INTRAMUSCULAR; INTRAVENOUS at 16:25

## 2021-10-19 RX ADMIN — LIDOCAINE HYDROCHLORIDE 1 APPLICATION: 20 JELLY TOPICAL at 12:02

## 2021-10-19 RX ADMIN — CIPROFLOXACIN 400 MG: 2 INJECTION, SOLUTION INTRAVENOUS at 21:41

## 2021-10-19 RX ADMIN — LIDOCAINE HYDROCHLORIDE 1 APPLICATION: 20 JELLY TOPICAL at 11:39

## 2021-10-19 RX ADMIN — FENTANYL CITRATE 50 MCG: 50 INJECTION, SOLUTION INTRAMUSCULAR; INTRAVENOUS at 16:15

## 2021-10-19 RX ADMIN — KETAMINE HYDROCHLORIDE 20 MG: 50 INJECTION INTRAMUSCULAR; INTRAVENOUS at 16:41

## 2021-10-19 RX ADMIN — SODIUM CHLORIDE, SODIUM LACTATE, POTASSIUM CHLORIDE, AND CALCIUM CHLORIDE: .6; .31; .03; .02 INJECTION, SOLUTION INTRAVENOUS at 15:48

## 2021-10-19 RX ADMIN — BACITRACIN, NEOMYCIN, POLYMYXIN B 1 SMALL APPLICATION: 400; 3.5; 5 OINTMENT TOPICAL at 21:27

## 2021-10-19 RX ADMIN — CEFAZOLIN SODIUM 3000 MG: 1 INJECTION, POWDER, FOR SOLUTION INTRAMUSCULAR; INTRAVENOUS at 16:08

## 2021-10-20 ENCOUNTER — TELEPHONE (OUTPATIENT)
Dept: UROLOGY | Facility: CLINIC | Age: 41
End: 2021-10-20

## 2021-10-20 VITALS
BODY MASS INDEX: 45.1 KG/M2 | WEIGHT: 315 LBS | TEMPERATURE: 97.5 F | HEART RATE: 92 BPM | SYSTOLIC BLOOD PRESSURE: 151 MMHG | RESPIRATION RATE: 18 BRPM | HEIGHT: 70 IN | OXYGEN SATURATION: 92 % | DIASTOLIC BLOOD PRESSURE: 74 MMHG

## 2021-10-20 LAB
ANION GAP SERPL CALCULATED.3IONS-SCNC: 9 MMOL/L (ref 4–13)
BUN SERPL-MCNC: 10 MG/DL (ref 5–25)
CALCIUM SERPL-MCNC: 8.6 MG/DL (ref 8.3–10.1)
CHLORIDE SERPL-SCNC: 105 MMOL/L (ref 100–108)
CO2 SERPL-SCNC: 29 MMOL/L (ref 21–32)
CREAT SERPL-MCNC: 0.69 MG/DL (ref 0.6–1.3)
ERYTHROCYTE [DISTWIDTH] IN BLOOD BY AUTOMATED COUNT: 15.4 % (ref 11.6–15.1)
GFR SERPL CREATININE-BSD FRML MDRD: 117 ML/MIN/1.73SQ M
GLUCOSE SERPL-MCNC: 97 MG/DL (ref 65–140)
HCT VFR BLD AUTO: 39.6 % (ref 36.5–49.3)
HGB BLD-MCNC: 12.3 G/DL (ref 12–17)
MCH RBC QN AUTO: 27.3 PG (ref 26.8–34.3)
MCHC RBC AUTO-ENTMCNC: 31.1 G/DL (ref 31.4–37.4)
MCV RBC AUTO: 88 FL (ref 82–98)
PLATELET # BLD AUTO: 202 THOUSANDS/UL (ref 149–390)
PMV BLD AUTO: 9.6 FL (ref 8.9–12.7)
POTASSIUM SERPL-SCNC: 3.9 MMOL/L (ref 3.5–5.3)
PROCALCITONIN SERPL-MCNC: <0.05 NG/ML
RBC # BLD AUTO: 4.5 MILLION/UL (ref 3.88–5.62)
SODIUM SERPL-SCNC: 143 MMOL/L (ref 136–145)
WBC # BLD AUTO: 11.79 THOUSAND/UL (ref 4.31–10.16)

## 2021-10-20 PROCEDURE — 99024 POSTOP FOLLOW-UP VISIT: CPT | Performed by: NURSE PRACTITIONER

## 2021-10-20 PROCEDURE — 85027 COMPLETE CBC AUTOMATED: CPT | Performed by: UROLOGY

## 2021-10-20 PROCEDURE — 80048 BASIC METABOLIC PNL TOTAL CA: CPT | Performed by: UROLOGY

## 2021-10-20 PROCEDURE — 84145 PROCALCITONIN (PCT): CPT

## 2021-10-20 RX ORDER — OXYBUTYNIN CHLORIDE 5 MG/1
5 TABLET ORAL 3 TIMES DAILY
Qty: 15 TABLET | Refills: 0 | Status: ON HOLD | OUTPATIENT
Start: 2021-10-20 | End: 2021-11-27

## 2021-10-20 RX ORDER — CIPROFLOXACIN 500 MG/1
500 TABLET, FILM COATED ORAL EVERY 12 HOURS SCHEDULED
Qty: 10 TABLET | Refills: 0 | Status: SHIPPED | OUTPATIENT
Start: 2021-10-20 | End: 2021-10-25

## 2021-10-20 RX ADMIN — KETOROLAC TROMETHAMINE 30 MG: 30 INJECTION, SOLUTION INTRAMUSCULAR at 13:02

## 2021-10-20 RX ADMIN — BACITRACIN, NEOMYCIN, POLYMYXIN B 1 SMALL APPLICATION: 400; 3.5; 5 OINTMENT TOPICAL at 09:27

## 2021-10-20 RX ADMIN — PANTOPRAZOLE SODIUM 20 MG: 20 TABLET, DELAYED RELEASE ORAL at 05:41

## 2021-10-20 RX ADMIN — KETOROLAC TROMETHAMINE 30 MG: 30 INJECTION, SOLUTION INTRAMUSCULAR at 05:41

## 2021-10-20 RX ADMIN — ACETAMINOPHEN 650 MG: 325 TABLET, FILM COATED ORAL at 05:41

## 2021-10-20 RX ADMIN — ACETAMINOPHEN 650 MG: 325 TABLET, FILM COATED ORAL at 00:42

## 2021-10-20 RX ADMIN — SODIUM CHLORIDE, SODIUM LACTATE, POTASSIUM CHLORIDE, AND CALCIUM CHLORIDE 125 ML/HR: .6; .31; .03; .02 INJECTION, SOLUTION INTRAVENOUS at 07:25

## 2021-10-20 RX ADMIN — ACETAMINOPHEN 650 MG: 325 TABLET, FILM COATED ORAL at 13:02

## 2021-10-20 RX ADMIN — KETOROLAC TROMETHAMINE 30 MG: 30 INJECTION, SOLUTION INTRAMUSCULAR at 00:42

## 2021-10-20 RX ADMIN — ENOXAPARIN SODIUM 40 MG: 40 INJECTION SUBCUTANEOUS at 09:26

## 2021-10-21 LAB — BACTERIA UR CULT: NORMAL

## 2021-10-22 ENCOUNTER — TELEPHONE (OUTPATIENT)
Dept: UROLOGY | Facility: AMBULATORY SURGERY CENTER | Age: 41
End: 2021-10-22

## 2021-10-24 LAB
BACTERIA BLD CULT: NORMAL
BACTERIA BLD CULT: NORMAL

## 2021-11-24 ENCOUNTER — APPOINTMENT (OUTPATIENT)
Dept: LAB | Facility: MEDICAL CENTER | Age: 41
End: 2021-11-24
Payer: COMMERCIAL

## 2021-11-24 ENCOUNTER — NURSE TRIAGE (OUTPATIENT)
Dept: OTHER | Facility: OTHER | Age: 41
End: 2021-11-24

## 2021-11-24 ENCOUNTER — HOSPITAL ENCOUNTER (INPATIENT)
Facility: HOSPITAL | Age: 41
LOS: 3 days | Discharge: HOME | DRG: 720 | End: 2021-11-27
Attending: EMERGENCY MEDICINE | Admitting: INTERNAL MEDICINE
Payer: COMMERCIAL

## 2021-11-24 ENCOUNTER — TELEPHONE (OUTPATIENT)
Dept: OTHER | Facility: HOSPITAL | Age: 41
End: 2021-11-24

## 2021-11-24 ENCOUNTER — APPOINTMENT (EMERGENCY)
Dept: RADIOLOGY | Facility: HOSPITAL | Age: 41
DRG: 720 | End: 2021-11-24
Payer: COMMERCIAL

## 2021-11-24 ENCOUNTER — APPOINTMENT (EMERGENCY)
Dept: CT IMAGING | Facility: HOSPITAL | Age: 41
DRG: 720 | End: 2021-11-24
Payer: COMMERCIAL

## 2021-11-24 DIAGNOSIS — N35.812 OTHER STRICTURE OF BULBOUS URETHRA IN MALE: ICD-10-CM

## 2021-11-24 DIAGNOSIS — R39.9 UTI SYMPTOMS: Primary | ICD-10-CM

## 2021-11-24 DIAGNOSIS — A41.9 SEPSIS (HCC): Primary | ICD-10-CM

## 2021-11-24 DIAGNOSIS — N39.0 UTI (URINARY TRACT INFECTION): ICD-10-CM

## 2021-11-24 DIAGNOSIS — N12 PYELONEPHRITIS: ICD-10-CM

## 2021-11-24 DIAGNOSIS — N35.912 STRICTURE OF BULBOUS URETHRA IN MALE, UNSPECIFIED STRICTURE TYPE: ICD-10-CM

## 2021-11-24 DIAGNOSIS — E87.1 HYPONATREMIA: ICD-10-CM

## 2021-11-24 DIAGNOSIS — E87.6 HYPOKALEMIA: ICD-10-CM

## 2021-11-24 DIAGNOSIS — N20.0 RENAL CALCULUS: ICD-10-CM

## 2021-11-24 DIAGNOSIS — R39.9 UTI SYMPTOMS: ICD-10-CM

## 2021-11-24 PROBLEM — N99.89 URINARY ANASTOMOTIC STRICTURE: Status: ACTIVE | Noted: 2021-11-24

## 2021-11-24 PROBLEM — R32 URINARY INCONTINENCE: Status: ACTIVE | Noted: 2021-11-24

## 2021-11-24 PROBLEM — R65.10 SIRS (SYSTEMIC INFLAMMATORY RESPONSE SYNDROME) (HCC): Status: ACTIVE | Noted: 2021-11-24

## 2021-11-24 PROBLEM — N35.919 URETHRAL STRICTURE: Status: ACTIVE | Noted: 2021-11-24

## 2021-11-24 LAB
2HR DELTA HS TROPONIN: 1 NG/L
ALBUMIN SERPL BCP-MCNC: 3.8 G/DL (ref 3.4–4.8)
ALP SERPL-CCNC: 48.6 U/L (ref 10–129)
ALT SERPL W P-5'-P-CCNC: 28 U/L (ref 5–63)
ANION GAP SERPL CALCULATED.3IONS-SCNC: 8 MMOL/L (ref 4–13)
APTT PPP: 34 SECONDS (ref 23–37)
AST SERPL W P-5'-P-CCNC: 29 U/L (ref 15–41)
BACTERIA UR QL AUTO: ABNORMAL /HPF
BACTERIA UR QL AUTO: NORMAL /HPF
BASE EX.OXY STD BLDV CALC-SCNC: 92.1 % (ref 60–80)
BASE EXCESS BLDV CALC-SCNC: 0.4 MMOL/L
BASOPHILS # BLD AUTO: 0.05 THOUSANDS/ΜL (ref 0–0.1)
BASOPHILS NFR BLD AUTO: 0 % (ref 0–1)
BILIRUB SERPL-MCNC: 0.62 MG/DL (ref 0.3–1.2)
BILIRUB UR QL STRIP: NEGATIVE
BILIRUB UR QL STRIP: NEGATIVE
BUN SERPL-MCNC: 17 MG/DL (ref 6–20)
CALCIUM SERPL-MCNC: 8.7 MG/DL (ref 8.4–10.2)
CARDIAC TROPONIN I PNL SERPL HS: 7 NG/L
CARDIAC TROPONIN I PNL SERPL HS: 8 NG/L
CHLORIDE SERPL-SCNC: 94 MMOL/L (ref 96–108)
CK MB SERPL-MCNC: 1.2 % (ref 0–2.5)
CK MB SERPL-MCNC: 2.2 NG/ML (ref 0.1–5.9)
CK SERPL-CCNC: 184.6 U/L (ref 49–397)
CLARITY UR: CLEAR
CLARITY UR: CLEAR
CO2 SERPL-SCNC: 26 MMOL/L (ref 22–33)
COLOR UR: YELLOW
COLOR UR: YELLOW
CREAT SERPL-MCNC: 0.93 MG/DL (ref 0.5–1.2)
EOSINOPHIL # BLD AUTO: 0.07 THOUSAND/ΜL (ref 0–0.61)
EOSINOPHIL NFR BLD AUTO: 0 % (ref 0–6)
ERYTHROCYTE [DISTWIDTH] IN BLOOD BY AUTOMATED COUNT: 14.9 % (ref 11.6–15.1)
FLUAV RNA RESP QL NAA+PROBE: NEGATIVE
FLUBV RNA RESP QL NAA+PROBE: NEGATIVE
GFR SERPL CREATININE-BSD FRML MDRD: 100 ML/MIN/1.73SQ M
GLUCOSE SERPL-MCNC: 127 MG/DL (ref 65–140)
GLUCOSE UR STRIP-MCNC: NEGATIVE MG/DL
GLUCOSE UR STRIP-MCNC: NEGATIVE MG/DL
HCO3 BLDV-SCNC: 23.7 MMOL/L (ref 24–30)
HCT VFR BLD AUTO: 39.4 % (ref 36.5–49.3)
HGB BLD-MCNC: 12.6 G/DL (ref 12–17)
HGB UR QL STRIP.AUTO: ABNORMAL
HGB UR QL STRIP.AUTO: ABNORMAL
IMM GRANULOCYTES # BLD AUTO: 0.05 THOUSAND/UL (ref 0–0.2)
IMM GRANULOCYTES NFR BLD AUTO: 0 % (ref 0–2)
INR PPP: 1.32 (ref 0.84–1.19)
KETONES UR STRIP-MCNC: NEGATIVE MG/DL
KETONES UR STRIP-MCNC: NEGATIVE MG/DL
LACTATE SERPL-SCNC: 1.1 MMOL/L (ref 0–2)
LEUKOCYTE ESTERASE UR QL STRIP: ABNORMAL
LEUKOCYTE ESTERASE UR QL STRIP: ABNORMAL
LYMPHOCYTES # BLD AUTO: 1.19 THOUSANDS/ΜL (ref 0.6–4.47)
LYMPHOCYTES NFR BLD AUTO: 8 % (ref 14–44)
MAGNESIUM SERPL-MCNC: 1.4 MG/DL (ref 1.6–2.6)
MCH RBC QN AUTO: 26.7 PG (ref 26.8–34.3)
MCHC RBC AUTO-ENTMCNC: 32 G/DL (ref 31.4–37.4)
MCV RBC AUTO: 84 FL (ref 82–98)
MONOCYTES # BLD AUTO: 2.3 THOUSAND/ΜL (ref 0.17–1.22)
MONOCYTES NFR BLD AUTO: 15 % (ref 4–12)
NEUTROPHILS # BLD AUTO: 12.1 THOUSANDS/ΜL (ref 1.85–7.62)
NEUTS SEG NFR BLD AUTO: 77 % (ref 43–75)
NITRITE UR QL STRIP: NEGATIVE
NITRITE UR QL STRIP: NEGATIVE
NON-SQ EPI CELLS URNS QL MICRO: ABNORMAL /HPF
NON-SQ EPI CELLS URNS QL MICRO: NORMAL /HPF
O2 CT BLDV-SCNC: 17.8 ML/DL
PCO2 BLDV: 34.3 MM HG (ref 42–50)
PH BLDV: 7.46 [PH] (ref 7.3–7.4)
PH UR STRIP.AUTO: 6 [PH]
PH UR STRIP.AUTO: 6 [PH]
PLATELET # BLD AUTO: 225 THOUSANDS/UL (ref 149–390)
PMV BLD AUTO: 10 FL (ref 8.9–12.7)
PO2 BLDV: 68 MM HG (ref 35–45)
POTASSIUM SERPL-SCNC: 3.2 MMOL/L (ref 3.5–5)
PROT SERPL-MCNC: 7.2 G/DL (ref 6.4–8.3)
PROT UR STRIP-MCNC: ABNORMAL MG/DL
PROT UR STRIP-MCNC: NEGATIVE MG/DL
PROTHROMBIN TIME: 16.2 SECONDS (ref 11.6–14.5)
RBC # BLD AUTO: 4.72 MILLION/UL (ref 3.88–5.62)
RBC #/AREA URNS AUTO: ABNORMAL /HPF
RBC #/AREA URNS AUTO: NORMAL /HPF
RSV RNA RESP QL NAA+PROBE: NEGATIVE
SARS-COV-2 RNA RESP QL NAA+PROBE: NEGATIVE
SODIUM SERPL-SCNC: 128 MMOL/L (ref 133–145)
SP GR UR STRIP.AUTO: 1.01 (ref 1–1.03)
SP GR UR STRIP.AUTO: 1.01 (ref 1–1.03)
UROBILINOGEN UR QL STRIP.AUTO: 0.2 E.U./DL
UROBILINOGEN UR QL STRIP.AUTO: 0.2 E.U./DL
WBC # BLD AUTO: 15.76 THOUSAND/UL (ref 4.31–10.16)
WBC #/AREA URNS AUTO: ABNORMAL /HPF
WBC #/AREA URNS AUTO: NORMAL /HPF

## 2021-11-24 PROCEDURE — G1004 CDSM NDSC: HCPCS

## 2021-11-24 PROCEDURE — 99223 1ST HOSP IP/OBS HIGH 75: CPT | Performed by: NURSE PRACTITIONER

## 2021-11-24 PROCEDURE — 85730 THROMBOPLASTIN TIME PARTIAL: CPT | Performed by: EMERGENCY MEDICINE

## 2021-11-24 PROCEDURE — 85610 PROTHROMBIN TIME: CPT | Performed by: EMERGENCY MEDICINE

## 2021-11-24 PROCEDURE — 84145 PROCALCITONIN (PCT): CPT | Performed by: EMERGENCY MEDICINE

## 2021-11-24 PROCEDURE — 71045 X-RAY EXAM CHEST 1 VIEW: CPT

## 2021-11-24 PROCEDURE — 96366 THER/PROPH/DIAG IV INF ADDON: CPT

## 2021-11-24 PROCEDURE — 36415 COLL VENOUS BLD VENIPUNCTURE: CPT | Performed by: EMERGENCY MEDICINE

## 2021-11-24 PROCEDURE — 87077 CULTURE AEROBIC IDENTIFY: CPT

## 2021-11-24 PROCEDURE — 81001 URINALYSIS AUTO W/SCOPE: CPT

## 2021-11-24 PROCEDURE — 80053 COMPREHEN METABOLIC PANEL: CPT | Performed by: EMERGENCY MEDICINE

## 2021-11-24 PROCEDURE — 82550 ASSAY OF CK (CPK): CPT | Performed by: EMERGENCY MEDICINE

## 2021-11-24 PROCEDURE — 96375 TX/PRO/DX INJ NEW DRUG ADDON: CPT

## 2021-11-24 PROCEDURE — 85025 COMPLETE CBC W/AUTO DIFF WBC: CPT | Performed by: EMERGENCY MEDICINE

## 2021-11-24 PROCEDURE — 96365 THER/PROPH/DIAG IV INF INIT: CPT

## 2021-11-24 PROCEDURE — 82805 BLOOD GASES W/O2 SATURATION: CPT | Performed by: EMERGENCY MEDICINE

## 2021-11-24 PROCEDURE — 83735 ASSAY OF MAGNESIUM: CPT | Performed by: NURSE PRACTITIONER

## 2021-11-24 PROCEDURE — 87040 BLOOD CULTURE FOR BACTERIA: CPT | Performed by: EMERGENCY MEDICINE

## 2021-11-24 PROCEDURE — 82553 CREATINE MB FRACTION: CPT | Performed by: EMERGENCY MEDICINE

## 2021-11-24 PROCEDURE — 0241U HB NFCT DS VIR RESP RNA 4 TRGT: CPT | Performed by: EMERGENCY MEDICINE

## 2021-11-24 PROCEDURE — 87086 URINE CULTURE/COLONY COUNT: CPT

## 2021-11-24 PROCEDURE — 96368 THER/DIAG CONCURRENT INF: CPT

## 2021-11-24 PROCEDURE — 84484 ASSAY OF TROPONIN QUANT: CPT | Performed by: EMERGENCY MEDICINE

## 2021-11-24 PROCEDURE — 87186 SC STD MICRODIL/AGAR DIL: CPT

## 2021-11-24 PROCEDURE — 99285 EMERGENCY DEPT VISIT HI MDM: CPT

## 2021-11-24 PROCEDURE — 83605 ASSAY OF LACTIC ACID: CPT | Performed by: EMERGENCY MEDICINE

## 2021-11-24 PROCEDURE — 81001 URINALYSIS AUTO W/SCOPE: CPT | Performed by: EMERGENCY MEDICINE

## 2021-11-24 PROCEDURE — 93005 ELECTROCARDIOGRAM TRACING: CPT

## 2021-11-24 PROCEDURE — 74176 CT ABD & PELVIS W/O CONTRAST: CPT

## 2021-11-24 PROCEDURE — 99285 EMERGENCY DEPT VISIT HI MDM: CPT | Performed by: EMERGENCY MEDICINE

## 2021-11-24 RX ORDER — SODIUM CHLORIDE, SODIUM GLUCONATE, SODIUM ACETATE, POTASSIUM CHLORIDE, MAGNESIUM CHLORIDE, SODIUM PHOSPHATE, DIBASIC, AND POTASSIUM PHOSPHATE .53; .5; .37; .037; .03; .012; .00082 G/100ML; G/100ML; G/100ML; G/100ML; G/100ML; G/100ML; G/100ML
1000 INJECTION, SOLUTION INTRAVENOUS ONCE
Status: DISCONTINUED | OUTPATIENT
Start: 2021-11-25 | End: 2021-11-24

## 2021-11-24 RX ORDER — KETOROLAC TROMETHAMINE 30 MG/ML
30 INJECTION, SOLUTION INTRAMUSCULAR; INTRAVENOUS ONCE
Status: COMPLETED | OUTPATIENT
Start: 2021-11-24 | End: 2021-11-24

## 2021-11-24 RX ORDER — MAGNESIUM SULFATE HEPTAHYDRATE 40 MG/ML
2 INJECTION, SOLUTION INTRAVENOUS ONCE
Status: COMPLETED | OUTPATIENT
Start: 2021-11-24 | End: 2021-11-25

## 2021-11-24 RX ORDER — SODIUM CHLORIDE, SODIUM LACTATE, POTASSIUM CHLORIDE, CALCIUM CHLORIDE 600; 310; 30; 20 MG/100ML; MG/100ML; MG/100ML; MG/100ML
100 INJECTION, SOLUTION INTRAVENOUS CONTINUOUS
Status: DISPENSED | OUTPATIENT
Start: 2021-11-24 | End: 2021-11-25

## 2021-11-24 RX ORDER — SODIUM CHLORIDE, SODIUM GLUCONATE, SODIUM ACETATE, POTASSIUM CHLORIDE, MAGNESIUM CHLORIDE, SODIUM PHOSPHATE, DIBASIC, AND POTASSIUM PHOSPHATE .53; .5; .37; .037; .03; .012; .00082 G/100ML; G/100ML; G/100ML; G/100ML; G/100ML; G/100ML; G/100ML
1000 INJECTION, SOLUTION INTRAVENOUS ONCE
Status: COMPLETED | OUTPATIENT
Start: 2021-11-24 | End: 2021-11-24

## 2021-11-24 RX ORDER — LISINOPRIL 20 MG/1
40 TABLET ORAL DAILY
Status: DISCONTINUED | OUTPATIENT
Start: 2021-11-25 | End: 2021-11-26

## 2021-11-24 RX ORDER — CEFTRIAXONE 1 G/50ML
1000 INJECTION, SOLUTION INTRAVENOUS EVERY 24 HOURS
Status: DISCONTINUED | OUTPATIENT
Start: 2021-11-25 | End: 2021-11-25

## 2021-11-24 RX ORDER — PANTOPRAZOLE SODIUM 40 MG/1
40 TABLET, DELAYED RELEASE ORAL
Status: DISCONTINUED | OUTPATIENT
Start: 2021-11-25 | End: 2021-11-27 | Stop reason: HOSPADM

## 2021-11-24 RX ORDER — ACETAMINOPHEN 325 MG/1
650 TABLET ORAL EVERY 6 HOURS PRN
Status: DISCONTINUED | OUTPATIENT
Start: 2021-11-24 | End: 2021-11-27 | Stop reason: HOSPADM

## 2021-11-24 RX ORDER — CEFTRIAXONE 2 G/50ML
2000 INJECTION, SOLUTION INTRAVENOUS ONCE
Status: COMPLETED | OUTPATIENT
Start: 2021-11-24 | End: 2021-11-24

## 2021-11-24 RX ORDER — POTASSIUM CHLORIDE 20 MEQ/1
40 TABLET, EXTENDED RELEASE ORAL ONCE
Status: COMPLETED | OUTPATIENT
Start: 2021-11-24 | End: 2021-11-24

## 2021-11-24 RX ADMIN — ENOXAPARIN SODIUM 60 MG: 60 INJECTION SUBCUTANEOUS at 23:00

## 2021-11-24 RX ADMIN — ACETAMINOPHEN 650 MG: 325 TABLET, FILM COATED ORAL at 22:59

## 2021-11-24 RX ADMIN — SODIUM CHLORIDE, SODIUM LACTATE, POTASSIUM CHLORIDE, AND CALCIUM CHLORIDE 100 ML/HR: .6; .31; .03; .02 INJECTION, SOLUTION INTRAVENOUS at 22:59

## 2021-11-24 RX ADMIN — MAGNESIUM SULFATE HEPTAHYDRATE 2 G: 40 INJECTION, SOLUTION INTRAVENOUS at 22:58

## 2021-11-24 RX ADMIN — SODIUM CHLORIDE, SODIUM GLUCONATE, SODIUM ACETATE, POTASSIUM CHLORIDE, MAGNESIUM CHLORIDE, SODIUM PHOSPHATE, DIBASIC, AND POTASSIUM PHOSPHATE 1000 ML: .53; .5; .37; .037; .03; .012; .00082 INJECTION, SOLUTION INTRAVENOUS at 22:51

## 2021-11-24 RX ADMIN — CEFTRIAXONE 2000 MG: 2 INJECTION, SOLUTION INTRAVENOUS at 20:10

## 2021-11-24 RX ADMIN — KETOROLAC TROMETHAMINE 30 MG: 30 INJECTION, SOLUTION INTRAMUSCULAR at 20:13

## 2021-11-24 RX ADMIN — POTASSIUM CHLORIDE 40 MEQ: 1500 TABLET, EXTENDED RELEASE ORAL at 20:36

## 2021-11-24 RX ADMIN — SODIUM CHLORIDE, SODIUM GLUCONATE, SODIUM ACETATE, POTASSIUM CHLORIDE, MAGNESIUM CHLORIDE, SODIUM PHOSPHATE, DIBASIC, AND POTASSIUM PHOSPHATE 1000 ML: .53; .5; .37; .037; .03; .012; .00082 INJECTION, SOLUTION INTRAVENOUS at 21:42

## 2021-11-24 RX ADMIN — SODIUM CHLORIDE, SODIUM GLUCONATE, SODIUM ACETATE, POTASSIUM CHLORIDE, MAGNESIUM CHLORIDE, SODIUM PHOSPHATE, DIBASIC, AND POTASSIUM PHOSPHATE 1000 ML: .53; .5; .37; .037; .03; .012; .00082 INJECTION, SOLUTION INTRAVENOUS at 19:25

## 2021-11-25 PROBLEM — E87.1 HYPONATREMIA: Status: RESOLVED | Noted: 2021-11-24 | Resolved: 2021-11-25

## 2021-11-25 LAB
4HR DELTA HS TROPONIN: 1 NG/L
ANION GAP SERPL CALCULATED.3IONS-SCNC: 8 MMOL/L (ref 4–13)
ATRIAL RATE: 112 BPM
ATRIAL RATE: 113 BPM
BASOPHILS # BLD AUTO: 0.05 THOUSANDS/ΜL (ref 0–0.1)
BASOPHILS NFR BLD AUTO: 0 % (ref 0–1)
BUN SERPL-MCNC: 12 MG/DL (ref 6–20)
CALCIUM SERPL-MCNC: 8.2 MG/DL (ref 8.4–10.2)
CARDIAC TROPONIN I PNL SERPL HS: 8 NG/L
CHLORIDE SERPL-SCNC: 98 MMOL/L (ref 96–108)
CO2 SERPL-SCNC: 29 MMOL/L (ref 22–33)
CREAT SERPL-MCNC: 0.83 MG/DL (ref 0.5–1.2)
CRP SERPL QL: 20 MG/DL (ref 0–1)
EOSINOPHIL # BLD AUTO: 0.63 THOUSAND/ΜL (ref 0–0.61)
EOSINOPHIL NFR BLD AUTO: 3 % (ref 0–6)
ERYTHROCYTE [DISTWIDTH] IN BLOOD BY AUTOMATED COUNT: 15 % (ref 11.6–15.1)
GFR SERPL CREATININE-BSD FRML MDRD: 108 ML/MIN/1.73SQ M
GLUCOSE SERPL-MCNC: 96 MG/DL (ref 65–140)
HCT VFR BLD AUTO: 38.6 % (ref 36.5–49.3)
HGB BLD-MCNC: 12.4 G/DL (ref 12–17)
IMM GRANULOCYTES # BLD AUTO: 0.06 THOUSAND/UL (ref 0–0.2)
IMM GRANULOCYTES NFR BLD AUTO: 0 % (ref 0–2)
LYMPHOCYTES # BLD AUTO: 1.19 THOUSANDS/ΜL (ref 0.6–4.47)
LYMPHOCYTES NFR BLD AUTO: 6 % (ref 14–44)
MAGNESIUM SERPL-MCNC: 2.1 MG/DL (ref 1.6–2.6)
MCH RBC QN AUTO: 27.1 PG (ref 26.8–34.3)
MCHC RBC AUTO-ENTMCNC: 32.1 G/DL (ref 31.4–37.4)
MCV RBC AUTO: 84 FL (ref 82–98)
MONOCYTES # BLD AUTO: 2.66 THOUSAND/ΜL (ref 0.17–1.22)
MONOCYTES NFR BLD AUTO: 14 % (ref 4–12)
NEUTROPHILS # BLD AUTO: 14.62 THOUSANDS/ΜL (ref 1.85–7.62)
NEUTS SEG NFR BLD AUTO: 77 % (ref 43–75)
P AXIS: 21 DEGREES
P AXIS: 31 DEGREES
PLATELET # BLD AUTO: 208 THOUSANDS/UL (ref 149–390)
PMV BLD AUTO: 10.3 FL (ref 8.9–12.7)
POTASSIUM SERPL-SCNC: 3.7 MMOL/L (ref 3.5–5)
PR INTERVAL: 135 MS
PR INTERVAL: 136 MS
PROCALCITONIN SERPL-MCNC: 0.53 NG/ML
PROCALCITONIN SERPL-MCNC: 0.57 NG/ML
QRS AXIS: 39 DEGREES
QRS AXIS: 40 DEGREES
QRSD INTERVAL: 94 MS
QRSD INTERVAL: 97 MS
QT INTERVAL: 306 MS
QT INTERVAL: 312 MS
QTC INTERVAL: 418 MS
QTC INTERVAL: 428 MS
RBC # BLD AUTO: 4.58 MILLION/UL (ref 3.88–5.62)
SODIUM SERPL-SCNC: 135 MMOL/L (ref 133–145)
T WAVE AXIS: 33 DEGREES
T WAVE AXIS: 39 DEGREES
VENTRICULAR RATE: 112 BPM
VENTRICULAR RATE: 113 BPM
WBC # BLD AUTO: 18.4 THOUSAND/UL (ref 4.31–10.16)

## 2021-11-25 PROCEDURE — 93010 ELECTROCARDIOGRAM REPORT: CPT | Performed by: INTERNAL MEDICINE

## 2021-11-25 PROCEDURE — 86140 C-REACTIVE PROTEIN: CPT | Performed by: INTERNAL MEDICINE

## 2021-11-25 PROCEDURE — 83735 ASSAY OF MAGNESIUM: CPT | Performed by: NURSE PRACTITIONER

## 2021-11-25 PROCEDURE — 80048 BASIC METABOLIC PNL TOTAL CA: CPT | Performed by: NURSE PRACTITIONER

## 2021-11-25 PROCEDURE — 84145 PROCALCITONIN (PCT): CPT | Performed by: EMERGENCY MEDICINE

## 2021-11-25 PROCEDURE — 85025 COMPLETE CBC W/AUTO DIFF WBC: CPT | Performed by: NURSE PRACTITIONER

## 2021-11-25 RX ORDER — SODIUM CHLORIDE, SODIUM LACTATE, POTASSIUM CHLORIDE, CALCIUM CHLORIDE 600; 310; 30; 20 MG/100ML; MG/100ML; MG/100ML; MG/100ML
125 INJECTION, SOLUTION INTRAVENOUS CONTINUOUS
Status: DISCONTINUED | OUTPATIENT
Start: 2021-11-25 | End: 2021-11-27 | Stop reason: HOSPADM

## 2021-11-25 RX ORDER — KETOROLAC TROMETHAMINE 30 MG/ML
15 INJECTION, SOLUTION INTRAMUSCULAR; INTRAVENOUS ONCE
Status: COMPLETED | OUTPATIENT
Start: 2021-11-25 | End: 2021-11-25

## 2021-11-25 RX ORDER — CEFEPIME HYDROCHLORIDE 2 G/50ML
2000 INJECTION, SOLUTION INTRAVENOUS EVERY 8 HOURS
Status: DISCONTINUED | OUTPATIENT
Start: 2021-11-25 | End: 2021-11-26

## 2021-11-25 RX ORDER — IBUPROFEN 400 MG/1
400 TABLET ORAL EVERY 6 HOURS PRN
Status: DISCONTINUED | OUTPATIENT
Start: 2021-11-25 | End: 2021-11-27 | Stop reason: HOSPADM

## 2021-11-25 RX ADMIN — ENOXAPARIN SODIUM 60 MG: 60 INJECTION SUBCUTANEOUS at 09:48

## 2021-11-25 RX ADMIN — IBUPROFEN 400 MG: 400 TABLET, FILM COATED ORAL at 17:02

## 2021-11-25 RX ADMIN — SODIUM CHLORIDE, SODIUM LACTATE, POTASSIUM CHLORIDE, AND CALCIUM CHLORIDE 1000 ML: .6; .31; .03; .02 INJECTION, SOLUTION INTRAVENOUS at 17:02

## 2021-11-25 RX ADMIN — SODIUM CHLORIDE, SODIUM LACTATE, POTASSIUM CHLORIDE, AND CALCIUM CHLORIDE 125 ML/HR: .6; .31; .03; .02 INJECTION, SOLUTION INTRAVENOUS at 20:05

## 2021-11-25 RX ADMIN — ACETAMINOPHEN 650 MG: 325 TABLET, FILM COATED ORAL at 19:45

## 2021-11-25 RX ADMIN — ENOXAPARIN SODIUM 60 MG: 60 INJECTION SUBCUTANEOUS at 19:45

## 2021-11-25 RX ADMIN — ACETAMINOPHEN 650 MG: 325 TABLET, FILM COATED ORAL at 05:13

## 2021-11-25 RX ADMIN — KETOROLAC TROMETHAMINE 15 MG: 30 INJECTION, SOLUTION INTRAMUSCULAR at 17:02

## 2021-11-25 RX ADMIN — ACETAMINOPHEN 650 MG: 325 TABLET, FILM COATED ORAL at 12:45

## 2021-11-25 RX ADMIN — CEFEPIME HYDROCHLORIDE 2000 MG: 2 INJECTION, SOLUTION INTRAVENOUS at 09:50

## 2021-11-25 RX ADMIN — LISINOPRIL 40 MG: 20 TABLET ORAL at 09:48

## 2021-11-25 RX ADMIN — CEFEPIME HYDROCHLORIDE 2000 MG: 2 INJECTION, SOLUTION INTRAVENOUS at 17:02

## 2021-11-25 RX ADMIN — PANTOPRAZOLE SODIUM 40 MG: 40 TABLET, DELAYED RELEASE ORAL at 05:13

## 2021-11-25 RX ADMIN — CEFEPIME HYDROCHLORIDE 2000 MG: 2 INJECTION, SOLUTION INTRAVENOUS at 23:47

## 2021-11-26 LAB
ANION GAP SERPL CALCULATED.3IONS-SCNC: 8 MMOL/L (ref 4–13)
ATRIAL RATE: 112 BPM
BACTERIA UR CULT: ABNORMAL
BACTERIA UR CULT: ABNORMAL
BASOPHILS # BLD AUTO: 0.03 THOUSANDS/ΜL (ref 0–0.1)
BASOPHILS NFR BLD AUTO: 0 % (ref 0–1)
BUN SERPL-MCNC: 15 MG/DL (ref 6–20)
CALCIUM SERPL-MCNC: 8.9 MG/DL (ref 8.4–10.2)
CHLORIDE SERPL-SCNC: 99 MMOL/L (ref 96–108)
CO2 SERPL-SCNC: 29 MMOL/L (ref 22–33)
CREAT SERPL-MCNC: 0.73 MG/DL (ref 0.5–1.2)
EOSINOPHIL # BLD AUTO: 0.53 THOUSAND/ΜL (ref 0–0.61)
EOSINOPHIL NFR BLD AUTO: 5 % (ref 0–6)
ERYTHROCYTE [DISTWIDTH] IN BLOOD BY AUTOMATED COUNT: 15.4 % (ref 11.6–15.1)
GFR SERPL CREATININE-BSD FRML MDRD: 114 ML/MIN/1.73SQ M
GLUCOSE SERPL-MCNC: 84 MG/DL (ref 65–140)
HCT VFR BLD AUTO: 40.2 % (ref 36.5–49.3)
HGB BLD-MCNC: 12.4 G/DL (ref 12–17)
IMM GRANULOCYTES # BLD AUTO: 0.04 THOUSAND/UL (ref 0–0.2)
IMM GRANULOCYTES NFR BLD AUTO: 0 % (ref 0–2)
LYMPHOCYTES # BLD AUTO: 0.99 THOUSANDS/ΜL (ref 0.6–4.47)
LYMPHOCYTES NFR BLD AUTO: 9 % (ref 14–44)
MCH RBC QN AUTO: 26.6 PG (ref 26.8–34.3)
MCHC RBC AUTO-ENTMCNC: 30.8 G/DL (ref 31.4–37.4)
MCV RBC AUTO: 86 FL (ref 82–98)
MONOCYTES # BLD AUTO: 2 THOUSAND/ΜL (ref 0.17–1.22)
MONOCYTES NFR BLD AUTO: 19 % (ref 4–12)
NEUTROPHILS # BLD AUTO: 6.94 THOUSANDS/ΜL (ref 1.85–7.62)
NEUTS SEG NFR BLD AUTO: 67 % (ref 43–75)
P AXIS: 21 DEGREES
PLATELET # BLD AUTO: 176 THOUSANDS/UL (ref 149–390)
PMV BLD AUTO: 10.5 FL (ref 8.9–12.7)
POTASSIUM SERPL-SCNC: 3.8 MMOL/L (ref 3.5–5)
PR INTERVAL: 136 MS
QRS AXIS: 39 DEGREES
QRSD INTERVAL: 97 MS
QT INTERVAL: 306 MS
QTC INTERVAL: 418 MS
RBC # BLD AUTO: 4.66 MILLION/UL (ref 3.88–5.62)
SODIUM SERPL-SCNC: 136 MMOL/L (ref 133–145)
T WAVE AXIS: 33 DEGREES
VENTRICULAR RATE: 112 BPM
WBC # BLD AUTO: 10.53 THOUSAND/UL (ref 4.31–10.16)

## 2021-11-26 PROCEDURE — 99232 SBSQ HOSP IP/OBS MODERATE 35: CPT | Performed by: INTERNAL MEDICINE

## 2021-11-26 PROCEDURE — 93010 ELECTROCARDIOGRAM REPORT: CPT | Performed by: INTERNAL MEDICINE

## 2021-11-26 PROCEDURE — 80048 BASIC METABOLIC PNL TOTAL CA: CPT | Performed by: INTERNAL MEDICINE

## 2021-11-26 PROCEDURE — 85025 COMPLETE CBC W/AUTO DIFF WBC: CPT | Performed by: INTERNAL MEDICINE

## 2021-11-26 RX ORDER — CEFAZOLIN SODIUM 2 G/50ML
2000 SOLUTION INTRAVENOUS EVERY 8 HOURS
Status: DISCONTINUED | OUTPATIENT
Start: 2021-11-26 | End: 2021-11-27 | Stop reason: HOSPADM

## 2021-11-26 RX ADMIN — LISINOPRIL 40 MG: 20 TABLET ORAL at 08:45

## 2021-11-26 RX ADMIN — ENOXAPARIN SODIUM 60 MG: 60 INJECTION SUBCUTANEOUS at 08:45

## 2021-11-26 RX ADMIN — CEFEPIME HYDROCHLORIDE 2000 MG: 2 INJECTION, SOLUTION INTRAVENOUS at 08:45

## 2021-11-26 RX ADMIN — CEFAZOLIN SODIUM 2000 MG: 2 SOLUTION INTRAVENOUS at 14:34

## 2021-11-26 RX ADMIN — PANTOPRAZOLE SODIUM 40 MG: 40 TABLET, DELAYED RELEASE ORAL at 05:24

## 2021-11-26 RX ADMIN — ACETAMINOPHEN 650 MG: 325 TABLET, FILM COATED ORAL at 20:40

## 2021-11-26 RX ADMIN — SODIUM CHLORIDE, SODIUM LACTATE, POTASSIUM CHLORIDE, AND CALCIUM CHLORIDE 125 ML/HR: .6; .31; .03; .02 INJECTION, SOLUTION INTRAVENOUS at 04:42

## 2021-11-26 RX ADMIN — ENOXAPARIN SODIUM 60 MG: 60 INJECTION SUBCUTANEOUS at 20:42

## 2021-11-26 RX ADMIN — CEFAZOLIN SODIUM 2000 MG: 2 SOLUTION INTRAVENOUS at 20:42

## 2021-11-27 ENCOUNTER — APPOINTMENT (INPATIENT)
Dept: RADIOLOGY | Facility: HOSPITAL | Age: 41
DRG: 720 | End: 2021-11-27
Payer: COMMERCIAL

## 2021-11-27 VITALS
BODY MASS INDEX: 44.1 KG/M2 | OXYGEN SATURATION: 93 % | HEIGHT: 71 IN | HEART RATE: 88 BPM | DIASTOLIC BLOOD PRESSURE: 72 MMHG | TEMPERATURE: 99.6 F | WEIGHT: 315 LBS | RESPIRATION RATE: 18 BRPM | SYSTOLIC BLOOD PRESSURE: 150 MMHG

## 2021-11-27 LAB
ANION GAP SERPL CALCULATED.3IONS-SCNC: 6 MMOL/L (ref 4–13)
BASOPHILS # BLD AUTO: 0.03 THOUSANDS/ΜL (ref 0–0.1)
BASOPHILS NFR BLD AUTO: 0 % (ref 0–1)
BUN SERPL-MCNC: 12 MG/DL (ref 6–20)
CALCIUM SERPL-MCNC: 8.9 MG/DL (ref 8.4–10.2)
CHLORIDE SERPL-SCNC: 102 MMOL/L (ref 96–108)
CO2 SERPL-SCNC: 30 MMOL/L (ref 22–33)
CREAT SERPL-MCNC: 0.68 MG/DL (ref 0.5–1.2)
EOSINOPHIL # BLD AUTO: 0.14 THOUSAND/ΜL (ref 0–0.61)
EOSINOPHIL NFR BLD AUTO: 2 % (ref 0–6)
ERYTHROCYTE [DISTWIDTH] IN BLOOD BY AUTOMATED COUNT: 15.1 % (ref 11.6–15.1)
GFR SERPL CREATININE-BSD FRML MDRD: 117 ML/MIN/1.73SQ M
GLUCOSE SERPL-MCNC: 92 MG/DL (ref 65–140)
HCT VFR BLD AUTO: 36.9 % (ref 36.5–49.3)
HGB BLD-MCNC: 11.5 G/DL (ref 12–17)
IMM GRANULOCYTES # BLD AUTO: 0.02 THOUSAND/UL (ref 0–0.2)
IMM GRANULOCYTES NFR BLD AUTO: 0 % (ref 0–2)
LYMPHOCYTES # BLD AUTO: 1.44 THOUSANDS/ΜL (ref 0.6–4.47)
LYMPHOCYTES NFR BLD AUTO: 19 % (ref 14–44)
MCH RBC QN AUTO: 27 PG (ref 26.8–34.3)
MCHC RBC AUTO-ENTMCNC: 31.2 G/DL (ref 31.4–37.4)
MCV RBC AUTO: 87 FL (ref 82–98)
MONOCYTES # BLD AUTO: 1.3 THOUSAND/ΜL (ref 0.17–1.22)
MONOCYTES NFR BLD AUTO: 18 % (ref 4–12)
NEUTROPHILS # BLD AUTO: 4.49 THOUSANDS/ΜL (ref 1.85–7.62)
NEUTS SEG NFR BLD AUTO: 61 % (ref 43–75)
PLATELET # BLD AUTO: 216 THOUSANDS/UL (ref 149–390)
PMV BLD AUTO: 9.7 FL (ref 8.9–12.7)
POTASSIUM SERPL-SCNC: 4 MMOL/L (ref 3.5–5)
RBC # BLD AUTO: 4.26 MILLION/UL (ref 3.88–5.62)
SODIUM SERPL-SCNC: 138 MMOL/L (ref 133–145)
WBC # BLD AUTO: 7.42 THOUSAND/UL (ref 4.31–10.16)

## 2021-11-27 PROCEDURE — 85025 COMPLETE CBC W/AUTO DIFF WBC: CPT | Performed by: INTERNAL MEDICINE

## 2021-11-27 PROCEDURE — 80048 BASIC METABOLIC PNL TOTAL CA: CPT | Performed by: INTERNAL MEDICINE

## 2021-11-27 PROCEDURE — 99239 HOSP IP/OBS DSCHRG MGMT >30: CPT | Performed by: INTERNAL MEDICINE

## 2021-11-27 PROCEDURE — 71045 X-RAY EXAM CHEST 1 VIEW: CPT

## 2021-11-27 RX ORDER — OXYBUTYNIN CHLORIDE 5 MG/1
5 TABLET ORAL 3 TIMES DAILY
Qty: 15 TABLET | Refills: 0 | Status: SHIPPED | OUTPATIENT
Start: 2021-11-27 | End: 2021-12-31 | Stop reason: HOSPADM

## 2021-11-27 RX ORDER — CEPHALEXIN 500 MG/1
500 CAPSULE ORAL EVERY 6 HOURS SCHEDULED
Qty: 16 CAPSULE | Refills: 0 | Status: SHIPPED | OUTPATIENT
Start: 2021-11-27 | End: 2021-11-27 | Stop reason: SDUPTHER

## 2021-11-27 RX ORDER — CEPHALEXIN 500 MG/1
500 CAPSULE ORAL EVERY 6 HOURS SCHEDULED
Qty: 16 CAPSULE | Refills: 0 | Status: SHIPPED | OUTPATIENT
Start: 2021-11-27 | End: 2021-12-01

## 2021-11-27 RX ADMIN — SODIUM CHLORIDE, SODIUM LACTATE, POTASSIUM CHLORIDE, AND CALCIUM CHLORIDE 125 ML/HR: .6; .31; .03; .02 INJECTION, SOLUTION INTRAVENOUS at 03:40

## 2021-11-27 RX ADMIN — ENOXAPARIN SODIUM 60 MG: 60 INJECTION SUBCUTANEOUS at 10:19

## 2021-11-27 RX ADMIN — CEFAZOLIN SODIUM 2000 MG: 2 SOLUTION INTRAVENOUS at 04:30

## 2021-11-27 RX ADMIN — PANTOPRAZOLE SODIUM 40 MG: 40 TABLET, DELAYED RELEASE ORAL at 05:59

## 2021-11-27 RX ADMIN — CEFAZOLIN SODIUM 2000 MG: 2 SOLUTION INTRAVENOUS at 12:29

## 2021-11-29 LAB
BACTERIA BLD CULT: NORMAL
BACTERIA BLD CULT: NORMAL

## 2021-12-07 ENCOUNTER — TELEPHONE (OUTPATIENT)
Dept: UROLOGY | Facility: AMBULATORY SURGERY CENTER | Age: 41
End: 2021-12-07

## 2021-12-07 NOTE — TELEPHONE ENCOUNTER
Patient is calling to ask if he is going to get transport [employer] set up for him for his upcoming appointment  He has no transportation   Please call patient back [phone]

## 2021-12-14 ENCOUNTER — OFFICE VISIT (OUTPATIENT)
Dept: UROLOGY | Facility: CLINIC | Age: 41
End: 2021-12-14
Payer: COMMERCIAL

## 2021-12-14 VITALS — BODY MASS INDEX: 44.1 KG/M2 | HEIGHT: 71 IN | WEIGHT: 315 LBS

## 2021-12-14 DIAGNOSIS — N35.919 STRICTURE OF MALE URETHRA, UNSPECIFIED STRICTURE TYPE: Primary | ICD-10-CM

## 2021-12-14 LAB — POST-VOID RESIDUAL VOLUME, ML POC: 616 ML

## 2021-12-14 PROCEDURE — 51798 US URINE CAPACITY MEASURE: CPT | Performed by: PHYSICIAN ASSISTANT

## 2021-12-14 PROCEDURE — 99024 POSTOP FOLLOW-UP VISIT: CPT | Performed by: PHYSICIAN ASSISTANT

## 2021-12-28 ENCOUNTER — APPOINTMENT (EMERGENCY)
Dept: CT IMAGING | Facility: HOSPITAL | Age: 41
DRG: 720 | End: 2021-12-28
Payer: COMMERCIAL

## 2021-12-28 ENCOUNTER — HOSPITAL ENCOUNTER (INPATIENT)
Facility: HOSPITAL | Age: 41
LOS: 2 days | Discharge: HOME | DRG: 720 | End: 2021-12-31
Attending: EMERGENCY MEDICINE | Admitting: INTERNAL MEDICINE
Payer: COMMERCIAL

## 2021-12-28 ENCOUNTER — APPOINTMENT (EMERGENCY)
Dept: RADIOLOGY | Facility: HOSPITAL | Age: 41
DRG: 720 | End: 2021-12-28
Payer: COMMERCIAL

## 2021-12-28 DIAGNOSIS — R65.10 SIRS (SYSTEMIC INFLAMMATORY RESPONSE SYNDROME) (HCC): ICD-10-CM

## 2021-12-28 DIAGNOSIS — N20.1 LEFT URETERAL STONE: ICD-10-CM

## 2021-12-28 DIAGNOSIS — R50.9 FEVER: ICD-10-CM

## 2021-12-28 DIAGNOSIS — N39.0 UTI (URINARY TRACT INFECTION): ICD-10-CM

## 2021-12-28 DIAGNOSIS — D72.829 LEUKOCYTOSIS: ICD-10-CM

## 2021-12-28 DIAGNOSIS — K59.00 CONSTIPATION, UNSPECIFIED CONSTIPATION TYPE: ICD-10-CM

## 2021-12-28 DIAGNOSIS — N20.0 RENAL CALCULUS: ICD-10-CM

## 2021-12-28 DIAGNOSIS — N20.1 URETEROLITHIASIS: Primary | ICD-10-CM

## 2021-12-28 PROBLEM — N17.9 AKI (ACUTE KIDNEY INJURY) (HCC): Status: ACTIVE | Noted: 2021-12-28

## 2021-12-28 LAB
ALBUMIN SERPL BCP-MCNC: 4 G/DL (ref 3.4–4.8)
ALP SERPL-CCNC: 43.9 U/L (ref 10–129)
ALT SERPL W P-5'-P-CCNC: 17 U/L (ref 5–63)
ANION GAP SERPL CALCULATED.3IONS-SCNC: 8 MMOL/L (ref 4–13)
APTT PPP: 33 SECONDS (ref 23–37)
AST SERPL W P-5'-P-CCNC: 20 U/L (ref 15–41)
BACTERIA UR QL AUTO: ABNORMAL /HPF
BASOPHILS # BLD AUTO: 0.05 THOUSANDS/ΜL (ref 0–0.1)
BASOPHILS NFR BLD AUTO: 0 % (ref 0–1)
BILIRUB SERPL-MCNC: 0.72 MG/DL (ref 0.3–1.2)
BILIRUB UR QL STRIP: NEGATIVE
BUN SERPL-MCNC: 17 MG/DL (ref 6–20)
CALCIUM SERPL-MCNC: 9.3 MG/DL (ref 8.4–10.2)
CHLORIDE SERPL-SCNC: 96 MMOL/L (ref 96–108)
CLARITY UR: CLEAR
CO2 SERPL-SCNC: 28 MMOL/L (ref 22–33)
COLOR UR: YELLOW
CREAT SERPL-MCNC: 1.22 MG/DL (ref 0.5–1.2)
EOSINOPHIL # BLD AUTO: 0.01 THOUSAND/ΜL (ref 0–0.61)
EOSINOPHIL NFR BLD AUTO: 0 % (ref 0–6)
ERYTHROCYTE [DISTWIDTH] IN BLOOD BY AUTOMATED COUNT: 14.7 % (ref 11.6–15.1)
FLUAV RNA RESP QL NAA+PROBE: NEGATIVE
FLUBV RNA RESP QL NAA+PROBE: NEGATIVE
GFR SERPL CREATININE-BSD FRML MDRD: 72 ML/MIN/1.73SQ M
GLUCOSE SERPL-MCNC: 102 MG/DL (ref 65–140)
GLUCOSE UR STRIP-MCNC: NEGATIVE MG/DL
HCT VFR BLD AUTO: 42.5 % (ref 36.5–49.3)
HGB BLD-MCNC: 13.8 G/DL (ref 12–17)
HGB UR QL STRIP.AUTO: ABNORMAL
IMM GRANULOCYTES # BLD AUTO: 0.07 THOUSAND/UL (ref 0–0.2)
IMM GRANULOCYTES NFR BLD AUTO: 0 % (ref 0–2)
INR PPP: 1.14 (ref 0.84–1.19)
KETONES UR STRIP-MCNC: NEGATIVE MG/DL
LACTATE SERPL-SCNC: 2 MMOL/L (ref 0–2)
LEUKOCYTE ESTERASE UR QL STRIP: ABNORMAL
LIPASE SERPL-CCNC: 17 U/L (ref 13–60)
LYMPHOCYTES # BLD AUTO: 0.57 THOUSANDS/ΜL (ref 0.6–4.47)
LYMPHOCYTES NFR BLD AUTO: 4 % (ref 14–44)
MCH RBC QN AUTO: 27.2 PG (ref 26.8–34.3)
MCHC RBC AUTO-ENTMCNC: 32.5 G/DL (ref 31.4–37.4)
MCV RBC AUTO: 84 FL (ref 82–98)
MONOCYTES # BLD AUTO: 0.89 THOUSAND/ΜL (ref 0.17–1.22)
MONOCYTES NFR BLD AUTO: 6 % (ref 4–12)
NEUTROPHILS # BLD AUTO: 14.39 THOUSANDS/ΜL (ref 1.85–7.62)
NEUTS SEG NFR BLD AUTO: 90 % (ref 43–75)
NITRITE UR QL STRIP: NEGATIVE
NON-SQ EPI CELLS URNS QL MICRO: ABNORMAL /HPF
NRBC BLD AUTO-RTO: 0 /100 WBCS
PH UR STRIP.AUTO: 5.5 [PH]
PLATELET # BLD AUTO: 239 THOUSANDS/UL (ref 149–390)
PMV BLD AUTO: 9.8 FL (ref 8.9–12.7)
POTASSIUM SERPL-SCNC: 3.8 MMOL/L (ref 3.5–5)
PROT SERPL-MCNC: 8 G/DL (ref 6.4–8.3)
PROT UR STRIP-MCNC: NEGATIVE MG/DL
PROTHROMBIN TIME: 14.5 SECONDS (ref 11.6–14.5)
RBC # BLD AUTO: 5.07 MILLION/UL (ref 3.88–5.62)
RBC #/AREA URNS AUTO: ABNORMAL /HPF
RSV RNA RESP QL NAA+PROBE: NEGATIVE
SARS-COV-2 RNA RESP QL NAA+PROBE: NEGATIVE
SODIUM SERPL-SCNC: 132 MMOL/L (ref 133–145)
SP GR UR STRIP.AUTO: 1.02 (ref 1–1.03)
UROBILINOGEN UR QL STRIP.AUTO: 0.2 E.U./DL
WBC # BLD AUTO: 15.98 THOUSAND/UL (ref 4.31–10.16)
WBC #/AREA URNS AUTO: ABNORMAL /HPF

## 2021-12-28 PROCEDURE — 87086 URINE CULTURE/COLONY COUNT: CPT | Performed by: PHYSICIAN ASSISTANT

## 2021-12-28 PROCEDURE — 36415 COLL VENOUS BLD VENIPUNCTURE: CPT | Performed by: PHYSICIAN ASSISTANT

## 2021-12-28 PROCEDURE — 80053 COMPREHEN METABOLIC PANEL: CPT | Performed by: PHYSICIAN ASSISTANT

## 2021-12-28 PROCEDURE — 99285 EMERGENCY DEPT VISIT HI MDM: CPT

## 2021-12-28 PROCEDURE — 0241U HB NFCT DS VIR RESP RNA 4 TRGT: CPT | Performed by: PHYSICIAN ASSISTANT

## 2021-12-28 PROCEDURE — 87077 CULTURE AEROBIC IDENTIFY: CPT | Performed by: PHYSICIAN ASSISTANT

## 2021-12-28 PROCEDURE — 93005 ELECTROCARDIOGRAM TRACING: CPT

## 2021-12-28 PROCEDURE — 99220 PR INITIAL OBSERVATION CARE/DAY 70 MINUTES: CPT | Performed by: NURSE PRACTITIONER

## 2021-12-28 PROCEDURE — 85025 COMPLETE CBC W/AUTO DIFF WBC: CPT | Performed by: PHYSICIAN ASSISTANT

## 2021-12-28 PROCEDURE — 96366 THER/PROPH/DIAG IV INF ADDON: CPT

## 2021-12-28 PROCEDURE — 81001 URINALYSIS AUTO W/SCOPE: CPT | Performed by: PHYSICIAN ASSISTANT

## 2021-12-28 PROCEDURE — 83690 ASSAY OF LIPASE: CPT | Performed by: PHYSICIAN ASSISTANT

## 2021-12-28 PROCEDURE — 84145 PROCALCITONIN (PCT): CPT | Performed by: PHYSICIAN ASSISTANT

## 2021-12-28 PROCEDURE — 96365 THER/PROPH/DIAG IV INF INIT: CPT

## 2021-12-28 PROCEDURE — 71045 X-RAY EXAM CHEST 1 VIEW: CPT

## 2021-12-28 PROCEDURE — 85610 PROTHROMBIN TIME: CPT | Performed by: PHYSICIAN ASSISTANT

## 2021-12-28 PROCEDURE — 74177 CT ABD & PELVIS W/CONTRAST: CPT

## 2021-12-28 PROCEDURE — 99285 EMERGENCY DEPT VISIT HI MDM: CPT | Performed by: PHYSICIAN ASSISTANT

## 2021-12-28 PROCEDURE — 85730 THROMBOPLASTIN TIME PARTIAL: CPT | Performed by: PHYSICIAN ASSISTANT

## 2021-12-28 PROCEDURE — 96361 HYDRATE IV INFUSION ADD-ON: CPT

## 2021-12-28 PROCEDURE — 87186 SC STD MICRODIL/AGAR DIL: CPT | Performed by: PHYSICIAN ASSISTANT

## 2021-12-28 PROCEDURE — 87040 BLOOD CULTURE FOR BACTERIA: CPT | Performed by: PHYSICIAN ASSISTANT

## 2021-12-28 PROCEDURE — 83605 ASSAY OF LACTIC ACID: CPT | Performed by: PHYSICIAN ASSISTANT

## 2021-12-28 RX ORDER — PANTOPRAZOLE SODIUM 40 MG/1
40 TABLET, DELAYED RELEASE ORAL
Status: DISCONTINUED | OUTPATIENT
Start: 2021-12-29 | End: 2021-12-31 | Stop reason: HOSPADM

## 2021-12-28 RX ORDER — ACETAMINOPHEN 325 MG/1
650 TABLET ORAL EVERY 6 HOURS PRN
Status: DISCONTINUED | OUTPATIENT
Start: 2021-12-28 | End: 2021-12-31 | Stop reason: HOSPADM

## 2021-12-28 RX ORDER — OXYCODONE HCL 5 MG/5 ML
5 SOLUTION, ORAL ORAL EVERY 6 HOURS PRN
Status: DISCONTINUED | OUTPATIENT
Start: 2021-12-28 | End: 2021-12-31 | Stop reason: HOSPADM

## 2021-12-28 RX ORDER — CEFTRIAXONE 1 G/50ML
1000 INJECTION, SOLUTION INTRAVENOUS EVERY 24 HOURS
Status: DISCONTINUED | OUTPATIENT
Start: 2021-12-29 | End: 2021-12-29

## 2021-12-28 RX ORDER — HYDROMORPHONE HCL/PF 1 MG/ML
0.5 SYRINGE (ML) INJECTION EVERY 6 HOURS PRN
Status: DISCONTINUED | OUTPATIENT
Start: 2021-12-28 | End: 2021-12-31 | Stop reason: HOSPADM

## 2021-12-28 RX ORDER — IBUPROFEN 600 MG/1
600 TABLET ORAL ONCE
Status: COMPLETED | OUTPATIENT
Start: 2021-12-28 | End: 2021-12-28

## 2021-12-28 RX ORDER — METHOCARBAMOL 500 MG/1
500 TABLET, FILM COATED ORAL EVERY 6 HOURS PRN
Status: DISCONTINUED | OUTPATIENT
Start: 2021-12-28 | End: 2021-12-31 | Stop reason: HOSPADM

## 2021-12-28 RX ORDER — CEFTRIAXONE 2 G/50ML
2000 INJECTION, SOLUTION INTRAVENOUS ONCE
Status: COMPLETED | OUTPATIENT
Start: 2021-12-28 | End: 2021-12-28

## 2021-12-28 RX ORDER — SENNOSIDES 8.6 MG
2 TABLET ORAL
Status: DISCONTINUED | OUTPATIENT
Start: 2021-12-28 | End: 2021-12-29

## 2021-12-28 RX ORDER — DOCUSATE SODIUM 100 MG/1
100 CAPSULE, LIQUID FILLED ORAL 2 TIMES DAILY PRN
Status: DISCONTINUED | OUTPATIENT
Start: 2021-12-28 | End: 2021-12-29

## 2021-12-28 RX ORDER — SODIUM CHLORIDE, SODIUM LACTATE, POTASSIUM CHLORIDE, CALCIUM CHLORIDE 600; 310; 30; 20 MG/100ML; MG/100ML; MG/100ML; MG/100ML
125 INJECTION, SOLUTION INTRAVENOUS CONTINUOUS
Status: DISCONTINUED | OUTPATIENT
Start: 2021-12-28 | End: 2021-12-30

## 2021-12-28 RX ORDER — ACETAMINOPHEN 325 MG/1
650 TABLET ORAL ONCE
Status: COMPLETED | OUTPATIENT
Start: 2021-12-28 | End: 2021-12-28

## 2021-12-28 RX ADMIN — IOHEXOL 100 ML: 350 INJECTION, SOLUTION INTRAVENOUS at 18:03

## 2021-12-28 RX ADMIN — SODIUM CHLORIDE, SODIUM LACTATE, POTASSIUM CHLORIDE, AND CALCIUM CHLORIDE 125 ML/HR: .6; .31; .03; .02 INJECTION, SOLUTION INTRAVENOUS at 23:48

## 2021-12-28 RX ADMIN — SODIUM CHLORIDE 1000 ML: 0.9 INJECTION, SOLUTION INTRAVENOUS at 18:47

## 2021-12-28 RX ADMIN — SODIUM CHLORIDE 1000 ML: 0.9 INJECTION, SOLUTION INTRAVENOUS at 16:45

## 2021-12-28 RX ADMIN — IBUPROFEN 600 MG: 600 TABLET ORAL at 16:46

## 2021-12-28 RX ADMIN — SODIUM CHLORIDE 1000 ML: 0.9 INJECTION, SOLUTION INTRAVENOUS at 19:27

## 2021-12-28 RX ADMIN — ACETAMINOPHEN 650 MG: 325 TABLET, FILM COATED ORAL at 16:46

## 2021-12-28 RX ADMIN — SODIUM CHLORIDE 1000 ML: 0.9 INJECTION, SOLUTION INTRAVENOUS at 17:21

## 2021-12-28 RX ADMIN — CEFTRIAXONE 2000 MG: 2 INJECTION, SOLUTION INTRAVENOUS at 16:48

## 2021-12-28 RX ADMIN — DOCUSATE SODIUM 100 MG: 100 CAPSULE, LIQUID FILLED ORAL at 21:50

## 2021-12-28 NOTE — ED PROVIDER NOTES
History  Chief Complaint   Patient presents with    Abdominal Pain     lower abdominal pain and lower back pain beginning yesterday  reports left lower worse  temp at home 99  Patient is a 52-year-old male with a past medical history of obesity, kidney stones and hypertension, presenting to the ED for evaluation of abdominal pain, fevers and urinary frequency x1 day  Patient states that yesterday he developed pain primarily in the left side of his abdomen that is now radiating into his left flank  He also notes fevers and chills for which he has been taking Tylenol but has not taken any today  He endorses urinary frequency but denies any dysuria or hematuria  He does admit to some baseline urinary issues and feels that he is not always emptying his bladder  He denies any cough, congestion, chest pain, shortness of breath, nausea, vomiting, diarrhea or constipation  He denies any sick contacts or known COVID exposures  Prior to Admission Medications   Prescriptions Last Dose Informant Patient Reported? Taking?   lisinopril (ZESTRIL) 40 mg tablet  Self No No   Sig: Take 1 tablet (40 mg total) by mouth daily   omeprazole (PriLOSEC) 20 mg delayed release capsule  Self No No   Sig: Take 1 capsule (20 mg total) by mouth daily   oxybutynin (DITROPAN) 5 mg tablet   No No   Sig: Take 1 tablet (5 mg total) by mouth 3 (three) times a day for 5 days Resume after completion of antibiotic course as per Urology  Facility-Administered Medications: None       Past Medical History:   Diagnosis Date    Anxiety     GERD (gastroesophageal reflux disease)     Hypertension     Urethral stricture        Past Surgical History:   Procedure Laterality Date    CYSTOSCOPY N/A 10/19/2021    Procedure: CYSTOSCOPY;  Surgeon: Pino Milian MD;  [facility];  Service: Urology    IA CYSTOSCOPY,DIR VIS INT URETHROTOMY N/A 10/19/2021    Procedure: DIRECT VISUAL INTERAL URETHROTOMY (DVIU);   Surgeon: Matt Somers MD;  Location[facility];  Service: Urology       History reviewed  No pertinent family history  I have reviewed and agree with the history as documented  E-Cigarette/Vaping    E-Cigarette Use Never User      E-Cigarette/Vaping Substances     Social History     Tobacco Use    Smoking status: Former Smoker     Types: Cigarettes    Smokeless tobacco: Never Used   Vaping Use    Vaping Use: Never used   Substance Use Topics    Alcohol use: Yes     Comment: rarely    Drug use: Never       Review of Systems   Constitutional: Positive for chills and fever  Negative for diaphoresis and fatigue  HENT: Negative for congestion, ear pain, mouth sores, rhinorrhea, sinus pain, sore throat and trouble swallowing  Eyes: Negative for photophobia and visual disturbance  Respiratory: Negative for cough, chest tightness, shortness of breath and wheezing  Cardiovascular: Negative for chest pain, palpitations and leg swelling  Gastrointestinal: Positive for abdominal pain  Negative for blood in stool, constipation, diarrhea, nausea and vomiting  Genitourinary: Positive for flank pain and frequency  Negative for dysuria, hematuria and urgency  Musculoskeletal: Negative for arthralgias, back pain, gait problem, joint swelling, myalgias and neck pain  Skin: Negative for color change, pallor and rash  Neurological: Negative for dizziness, syncope, speech difficulty, weakness, light-headedness, numbness and headaches  Psychiatric/Behavioral: Negative for confusion and sleep disturbance  All other systems reviewed and are negative  Physical Exam  Physical Exam  Vitals and nursing note reviewed  Constitutional:       General: He is awake  He is not in acute distress  Appearance: He is morbidly obese  He is ill-appearing  He is not diaphoretic  HENT:      Head: Normocephalic and atraumatic        Right Ear: External ear normal       Left Ear: External ear normal       Nose: Nose normal       Mouth/Throat:      Lips: Pink  Mouth: Mucous membranes are moist    Eyes:      General: Lids are normal  No scleral icterus  Conjunctiva/sclera: Conjunctivae normal       Pupils: Pupils are equal, round, and reactive to light  Cardiovascular:      Rate and Rhythm: Normal rate and regular rhythm  Pulses: Normal pulses  Radial pulses are 2+ on the right side and 2+ on the left side  Heart sounds: Normal heart sounds, S1 normal and S2 normal    Pulmonary:      Effort: Pulmonary effort is normal  Tachypnea present  No accessory muscle usage or respiratory distress  Breath sounds: Normal breath sounds  No stridor  No decreased breath sounds, wheezing, rhonchi or rales  Abdominal:      General: Abdomen is flat  Bowel sounds are normal  There is no distension  Palpations: Abdomen is soft  Tenderness: There is abdominal tenderness in the suprapubic area and left lower quadrant  There is no right CVA tenderness, left CVA tenderness, guarding or rebound  Comments: Tenderness over left middle/lower abdomen and suprapubic region; no rebound tenderness, guarding or rigidity; exam limited secondary to body habitus  Musculoskeletal:      Cervical back: Full passive range of motion without pain, normal range of motion and neck supple  No signs of trauma  No pain with movement  Normal range of motion  Right lower leg: No edema  Left lower leg: No edema  Lymphadenopathy:      Cervical: No cervical adenopathy  Skin:     General: Skin is warm and dry  Capillary Refill: Capillary refill takes less than 2 seconds  Coloration: Skin is not cyanotic, jaundiced or pale  Neurological:      Mental Status: He is alert and oriented to person, place, and time  GCS: GCS eye subscore is 4  GCS verbal subscore is 5  GCS motor subscore is 6  Cranial Nerves: No dysarthria or facial asymmetry        Gait: Gait normal    Psychiatric:         Attention and Perception: Attention normal          Mood and Affect: Mood normal          Speech: Speech normal          Behavior: Behavior normal  Behavior is cooperative  Vital Signs  ED Triage Vitals [12/28/21 1307]   Temperature Pulse Respirations Blood Pressure SpO2   (!) 101 3 °F (38 5 °C) (!) 121 (!) 40 143/75 99 %      Temp Source Heart Rate Source Patient Position - Orthostatic VS BP Location FiO2 (%)   Oral Monitor Sitting Left arm --      Pain Score       3           Vitals:    12/28/21 1307 12/28/21 1847 12/28/21 1923 12/28/21 2000   BP: 143/75 (!) 92/33 116/54 109/52   Pulse: (!) 121 101  97   Patient Position - Orthostatic VS: Sitting   Lying         Visual Acuity      ED Medications  Medications   docusate sodium (COLACE) capsule 100 mg (100 mg Oral Given 12/28/21 2150)   sodium chloride 0 9 % bolus 1,000 mL (0 mL Intravenous Stopped 12/28/21 1958)     Followed by   sodium chloride 0 9 % bolus 1,000 mL (0 mL Intravenous Stopped 12/28/21 2000)     Followed by   sodium chloride 0 9 % bolus 1,000 mL (0 mL Intravenous Stopped 12/28/21 2000)   cefTRIAXone (ROCEPHIN) IVPB (premix in dextrose) 2,000 mg 50 mL (0 mg Intravenous Stopped 12/28/21 1900)   acetaminophen (TYLENOL) tablet 650 mg (650 mg Oral Given 12/28/21 1646)   ibuprofen (MOTRIN) tablet 600 mg (600 mg Oral Given 12/28/21 1646)   iohexol (OMNIPAQUE) 350 MG/ML injection (SINGLE-DOSE) 100 mL (100 mL Intravenous Given 12/28/21 1803)   sodium chloride 0 9 % bolus 1,000 mL (1,000 mL Intravenous New Bag 12/28/21 1927)       Diagnostic Studies  Results Reviewed     Procedure Component Value Units Date/Time    Urine Microscopic [[id number]]  (Abnormal) Collected: 12/28/21 1722    Lab Status: Final result Specimen: Urine, Clean Catch Updated: 12/28/21 1741     RBC, UA 30-50 /hpf      WBC, UA 10-20 /hpf      Epithelial Cells Occasional /hpf      Bacteria, UA Moderate /hpf     Urine culture [[id number]] Collected: 12/28/21 1722    Lab Status:  In process Specimen: Urine, Clean Catch Updated: 12/28/21 1741    COVID/FLU/RSV - 2 hour TAT [[id number]]  (Normal) Collected: 12/28/21 1648    Lab Status: Final result Specimen: Nares from Nasopharyngeal Swab Updated: 12/28/21 1734     SARS-CoV-2 Negative     INFLUENZA A PCR Negative     INFLUENZA B PCR Negative     RSV PCR Negative    Narrative:      FOR PEDIATRIC PATIENTS - copy/paste COVID Guidelines URL to browser: [email or web address]  [email or web address]     This test has been authorized by FDA under an EUA (Emergency Use Assay) for use by authorized laboratories  Clinical caution and judgement should be used with the interpretation of these results with consideration of the clinical impression and other laboratory testing  Testing reported as "Positive" or "Negative" has been proven to be accurate according to standard laboratory validation requirements  All testing is performed with control materials showing appropriate reactivity at standard intervals      UA w Reflex to Microscopic w Reflex to Culture [[id number]]  (Abnormal) Collected: 12/28/21 1722    Lab Status: Final result Specimen: Urine, Clean Catch Updated: 12/28/21 1727     Color, UA Yellow     Clarity, UA Clear     Specific Gravity, UA 1 025     pH, UA 5 5     Leukocytes, UA Trace     Nitrite, UA Negative     Protein, UA Negative mg/dl      Glucose, UA Negative mg/dl      Ketones, UA Negative mg/dl      Urobilinogen, UA 0 2 E U /dl      Bilirubin, UA Negative     Blood, UA 3+    CBC and differential [[id number]]  (Abnormal) Collected: 12/28/21 1555    Lab Status: Final result Specimen: Blood from Arm, Right Updated: 12/28/21 1638     WBC 15 98 Thousand/uL      RBC 5 07 Million/uL      Hemoglobin 13 8 g/dL      Hematocrit 42 5 %      MCV 84 fL      MCH 27 2 pg      MCHC 32 5 g/dL      RDW 14 7 %      MPV 9 8 fL      Platelets 675 Thousands/uL      nRBC 0 /100 WBCs      Neutrophils Relative 90 %      Immat GRANS % 0 % Lymphocytes Relative 4 %      Monocytes Relative 6 %      Eosinophils Relative 0 %      Basophils Relative 0 %      Neutrophils Absolute 14 39 Thousands/µL      Immature Grans Absolute 0 07 Thousand/uL      Lymphocytes Absolute 0 57 Thousands/µL      Monocytes Absolute 0 89 Thousand/µL      Eosinophils Absolute 0 01 Thousand/µL      Basophils Absolute 0 05 Thousands/µL     Narrative: This is an appended report  These results have been appended to a previously verified report      Comprehensive metabolic panel [[id number]]  (Abnormal) Collected: 12/28/21 1555    Lab Status: Final result Specimen: Blood from Arm, Right Updated: 12/28/21 1630     Sodium 132 mmol/L      Potassium 3 8 mmol/L      Chloride 96 mmol/L      CO2 28 mmol/L      ANION GAP 8 mmol/L      BUN 17 mg/dL      Creatinine 1 22 mg/dL      Glucose 102 mg/dL      Calcium 9 3 mg/dL      AST 20 U/L      ALT 17 U/L      Alkaline Phosphatase 43 9 U/L      Total Protein 8 0 g/dL      Albumin 4 0 g/dL      Total Bilirubin 0 72 mg/dL      eGFR 72 ml/min/1 73sq m     Narrative:      [place] guidelines for Chronic Kidney Disease (CKD):     Stage 1 with normal or high GFR (GFR > 90 mL/min/1 73 square meters)    Stage 2 Mild CKD (GFR = 60-89 mL/min/1 73 square meters)    Stage 3A Moderate CKD (GFR = 45-59 mL/min/1 73 square meters)    Stage 3B Moderate CKD (GFR = 30-44 mL/min/1 73 square meters)    Stage 4 Severe CKD (GFR = 15-29 mL/min/1 73 square meters)    Stage 5 End Stage CKD (GFR <15 mL/min/1 73 square meters)  Note: GFR calculation is accurate only with a steady state creatinine    Lipase [[id number]]  (Normal) Collected: 12/28/21 1555    Lab Status: Final result Specimen: Blood from Arm, Right Updated: 12/28/21 1630     Lipase 17 u/L     Lactic acid [[id number]]  (Normal) Collected: 12/28/21 1555    Lab Status: Final result Specimen: Blood from Arm, Right Updated: 12/28/21 1628     LACTIC ACID 2 0 mmol/L     Narrative: Result may be elevated if tourniquet was used during collection  Aaliyah Kimble [[id number]]  (Normal) Collected: 12/28/21 1555    Lab Status: Final result Specimen: Blood from Arm, Right Updated: 12/28/21 1615     Protime 14 5 seconds      INR 1 14    APTT [[id number]]  (Normal) Collected: 12/28/21 1555    Lab Status: Final result Specimen: Blood from Arm, Right Updated: 12/28/21 1615     PTT 33 seconds     Blood culture #1 [[id number]] Collected: 12/28/21 1555    Lab Status: In process Specimen: Blood from Arm, Right Updated: 12/28/21 1601    Procalcitonin with AM Reflex [[id number]] Collected: 12/28/21 1555    Lab Status: In process Specimen: Blood from Arm, Right Updated: 12/28/21 1601    Blood culture #2 [[id number]] Collected: 12/28/21 1555    Lab Status: In process Specimen: Blood from Arm, Right Updated: 12/28/21 1601                 CT abdomen pelvis with contrast   Final Result by Ray Bobby DO (12/28 1846)   Exam is significantly LIMITED both by motion artifact, respiratory motion artifact and out of field artifact from patient's large body habitus  Prominent pulmonary vasculature  Hepatosplenomegaly  Prior cholecystectomy without biliary duct dilatation  Bilateral adrenal body nodules stable from previous exam   Could not obtain accurate attenuation measurements = could not confirm adenomas  Grossly stable right kidney with no hydronephrosis  LEFT SIDE RENAL COLIC with 8 x 6 x 5 mm calculus in the proximal ureter resulting MILD HYDRONEPHROSIS  Additional 5 mm nonobstructing stones/stone fragments in the LEFT lower pole  UROLOGY CONSULTATION ADVISED  Moderate amounts of formed stool the right colon  No obstruction  Rare colonic diverticula  No evidence of diverticulitis or colitis        Prominent but not pathologically enlarged retroperitoneal nodes stable from previous exam       URINARY BLADDER WALL THICKENING which may be due to trabeculation/incomplete distention  CYSTITIS could also cause this appearance although there is no perivesicular inflammation to suggest that  Please correlate with urinalysis  The study was marked in [facility] for immediate notification  Workstation performed: [id number]         XR chest portable   Final Result by Yuliet Turk MD (12/28 1659)      No active pulmonary disease on examination which is somewhat limited secondary to low lung volumes  In the setting of clinically suspected/proven COVID-19, this plain film appearance does not contain findings that raise concern for viral pneumonia such    as COVID-19, but does not rule out this diagnosis  Workstation performed: [id number]                    Procedures  Procedures         ED Course  ED Course as of 12/28/21 2307   Tue Dec 28, 2021   1640 Creatinine(!): 1 22   1640 Sodium(!): 132   1640 WBC(!): 15 98   1640 Temperature(!): 101 3 °F (38 5 °C)   1640 Pulse(!): 121   1640 Respirations(!): 40   1805 Bacteria, UA(!): Moderate   1805 WBC, UA(!): 10-20                            Initial Sepsis Screening     Row Name 12/28/21 1640                Is the patient's history suggestive of a new or worsening infection? Yes (Proceed)  -LH        Suspected source of infection urinary tract infection;acute abdominal infection  -LH        Are two or more of the following signs & symptoms of infection both present and new to the patient? Yes (Proceed)  -LH        Indicate SIRS criteria Hyperthemia > 38 3C (100 9F); Tachycardia > 90 bpm;Tachypnea > 20 resp per min;Leukocytosis (WBC > 05739 IJL)  -LH        If the answer is yes to both questions, suspicion of sepsis is present --        If severe sepsis is present AND tissue hypoperfusion perists in the hour after fluid resuscitation or lactate > 4, the patient meets criteria for SEPTIC SHOCK --        Are any of the following organ dysfunction criteria present within 6 hours of suspected infection and SIRS criteria that are NOT considered to be chronic conditions? --        Organ dysfunction --        Date of presentation of severe sepsis --        Time of presentation of severe sepsis --        Tissue hypoperfusion persists in the hour after crystalloid fluid administration, evidenced, by either: --        Was hypotension present within one hour of the conclusion of crystalloid fluid administration? --        Date of presentation of septic shock --        Time of presentation of septic shock --              User Key  (r) = Recorded By, (t) = Taken By, (c) = Cosigned By    [address] Name Provider Type    [address] PAM[place]HELLE Physician Assistant                SBIRT 20yo+      Most Recent Value   SBIRT (25 yo +)    In order to provide better care to our patients, we are screening all of our patients for alcohol and drug use  Would it be okay to ask you these screening questions? No Filed at: 12/28/2021 [address] [place] [address]  Number of Diagnoses or Management Options  Fever  Leukocytosis  Ureterolithiasis  UTI (urinary tract infection)  Diagnosis management comments: Patient is a 53-year-old male with a past medical history of obesity, kidney stones and hypertension, presenting to the ED for evaluation of abdominal pain, fevers and urinary frequency x1 day  Patient met SIRS criteria with tachypnea, tachycardia, fever and leukocytosis  He was given IV antibiotics and IV fluids based on ideal body weight as per protocol due to BMI being >30  Patient's creatinine mildly elevated from baseline at 1 22  Urine microscopic positive for bacteria as well as 10-20 wbc's  CT showed an 8 x 6 x 5 mm calculus in the proximal ureter resulting in mild hydronephrosis with an additional 5mm nonobstructing stones/stone fragments in the left lower pole  Discussed with Urology who evaluated patient and will take him to the OR in the morning for a stent    The management plan was discussed with patient in detail and all questions were answered  Amount and/or Complexity of Data Reviewed  Clinical lab tests: ordered and reviewed  Tests in the radiology section of CPT®: ordered and reviewed  Discuss the patient with other providers: yes (Dr Sid Dover)    Patient Progress  Patient progress: stable      Disposition  Final diagnoses:   Ureterolithiasis   UTI (urinary tract infection)   Fever   Leukocytosis     Time reflects when diagnosis was documented in both MDM as applicable and the Disposition within this note     Time User Action Codes Description Comment    12/28/2021  8:05 PM Radha Dopranav Add [N20 1] Ureterolithiasis     12/28/2021  8:05 PM Kelly Aparicio Add [N39 0] UTI (urinary tract infection)     12/28/2021  8:05 PM Kelly Aparicio Add [R50 9] Fever     12/28/2021  8:05 PM Orest Doom Add [B70 638] Leukocytosis       ED Disposition     ED Disposition Condition Date/Time Comment    Admit Stable Tue Dec 28, 2021  8:04 PM Case was discussed with WOOD and the patient's admission status was agreed to be Admission Status: observation status to the service of Dr Anil Hernandez  Follow-up Information    None         Patient's Medications   Discharge Prescriptions    No medications on file       No discharge procedures on file      PDMP Review     None          ED Provider  Electronically Signed by           Nikolai Mera PA-C  12/28/21 6235

## 2021-12-29 ENCOUNTER — ANESTHESIA EVENT (OUTPATIENT)
Dept: PERIOP | Facility: HOSPITAL | Age: 41
DRG: 720 | End: 2021-12-29
Payer: COMMERCIAL

## 2021-12-29 ENCOUNTER — HOSPITAL ENCOUNTER (OUTPATIENT)
Dept: RADIOLOGY | Facility: HOSPITAL | Age: 41
Setting detail: OBSERVATION
Discharge: HOME | DRG: 720 | End: 2021-12-29
Payer: COMMERCIAL

## 2021-12-29 ENCOUNTER — ANESTHESIA (OUTPATIENT)
Dept: PERIOP | Facility: HOSPITAL | Age: 41
DRG: 720 | End: 2021-12-29
Payer: COMMERCIAL

## 2021-12-29 LAB
ANION GAP SERPL CALCULATED.3IONS-SCNC: 9 MMOL/L (ref 4–13)
ATRIAL RATE: 107 BPM
BACTERIA UR CULT: NORMAL
BASOPHILS # BLD AUTO: 0.07 THOUSANDS/ΜL (ref 0–0.1)
BASOPHILS NFR BLD AUTO: 0 % (ref 0–1)
BUN SERPL-MCNC: 19 MG/DL (ref 6–20)
CALCIUM SERPL-MCNC: 8 MG/DL (ref 8.4–10.2)
CHLORIDE SERPL-SCNC: 102 MMOL/L (ref 96–108)
CO2 SERPL-SCNC: 26 MMOL/L (ref 22–33)
CREAT SERPL-MCNC: 1.4 MG/DL (ref 0.5–1.2)
EOSINOPHIL # BLD AUTO: 0.1 THOUSAND/ΜL (ref 0–0.61)
EOSINOPHIL NFR BLD AUTO: 0 % (ref 0–6)
ERYTHROCYTE [DISTWIDTH] IN BLOOD BY AUTOMATED COUNT: 15.2 % (ref 11.6–15.1)
GFR SERPL CREATININE-BSD FRML MDRD: 61 ML/MIN/1.73SQ M
GLUCOSE P FAST SERPL-MCNC: 99 MG/DL (ref 70–105)
GLUCOSE SERPL-MCNC: 99 MG/DL (ref 65–140)
HCT VFR BLD AUTO: 38.1 % (ref 36.5–49.3)
HGB BLD-MCNC: 12.4 G/DL (ref 12–17)
IMM GRANULOCYTES # BLD AUTO: 0.14 THOUSAND/UL (ref 0–0.2)
IMM GRANULOCYTES NFR BLD AUTO: 1 % (ref 0–2)
LYMPHOCYTES # BLD AUTO: 0.51 THOUSANDS/ΜL (ref 0.6–4.47)
LYMPHOCYTES NFR BLD AUTO: 2 % (ref 14–44)
MCH RBC QN AUTO: 27.6 PG (ref 26.8–34.3)
MCHC RBC AUTO-ENTMCNC: 32.5 G/DL (ref 31.4–37.4)
MCV RBC AUTO: 85 FL (ref 82–98)
MONOCYTES # BLD AUTO: 2.79 THOUSAND/ΜL (ref 0.17–1.22)
MONOCYTES NFR BLD AUTO: 11 % (ref 4–12)
NEUTROPHILS # BLD AUTO: 21.73 THOUSANDS/ΜL (ref 1.85–7.62)
NEUTS SEG NFR BLD AUTO: 86 % (ref 43–75)
NRBC BLD AUTO-RTO: 0 /100 WBCS
P AXIS: 54 DEGREES
PLATELET # BLD AUTO: 206 THOUSANDS/UL (ref 149–390)
PMV BLD AUTO: 10.1 FL (ref 8.9–12.7)
POTASSIUM SERPL-SCNC: 3.5 MMOL/L (ref 3.5–5)
PR INTERVAL: 137 MS
PROCALCITONIN SERPL-MCNC: 0.91 NG/ML
PROCALCITONIN SERPL-MCNC: 5.9 NG/ML
QRS AXIS: 23 DEGREES
QRSD INTERVAL: 89 MS
QT INTERVAL: 311 MS
QTC INTERVAL: 415 MS
RBC # BLD AUTO: 4.5 MILLION/UL (ref 3.88–5.62)
SODIUM SERPL-SCNC: 137 MMOL/L (ref 133–145)
T WAVE AXIS: 57 DEGREES
VENTRICULAR RATE: 107 BPM
WBC # BLD AUTO: 25.34 THOUSAND/UL (ref 4.31–10.16)

## 2021-12-29 PROCEDURE — 99232 SBSQ HOSP IP/OBS MODERATE 35: CPT | Performed by: INTERNAL MEDICINE

## 2021-12-29 PROCEDURE — C2617 STENT, NON-COR, TEM W/O DEL: HCPCS | Performed by: SPECIALIST

## 2021-12-29 PROCEDURE — 87186 SC STD MICRODIL/AGAR DIL: CPT | Performed by: SPECIALIST

## 2021-12-29 PROCEDURE — C1769 GUIDE WIRE: HCPCS | Performed by: SPECIALIST

## 2021-12-29 PROCEDURE — 87040 BLOOD CULTURE FOR BACTERIA: CPT | Performed by: INTERNAL MEDICINE

## 2021-12-29 PROCEDURE — 36415 COLL VENOUS BLD VENIPUNCTURE: CPT | Performed by: NURSE PRACTITIONER

## 2021-12-29 PROCEDURE — C1874 STENT, COATED/COV W/DEL SYS: HCPCS | Performed by: SPECIALIST

## 2021-12-29 PROCEDURE — 0T7D8ZZ DILATION OF URETHRA, VIA NATURAL OR ARTIFICIAL OPENING ENDOSCOPIC: ICD-10-PCS | Performed by: INTERNAL MEDICINE

## 2021-12-29 PROCEDURE — BT1F1ZZ FLUOROSCOPY OF LEFT KIDNEY, URETER AND BLADDER USING LOW OSMOLAR CONTRAST: ICD-10-PCS | Performed by: INTERNAL MEDICINE

## 2021-12-29 PROCEDURE — 0T778DZ DILATION OF LEFT URETER WITH INTRALUMINAL DEVICE, VIA NATURAL OR ARTIFICIAL OPENING ENDOSCOPIC: ICD-10-PCS | Performed by: INTERNAL MEDICINE

## 2021-12-29 PROCEDURE — 93010 ELECTROCARDIOGRAM REPORT: CPT | Performed by: INTERNAL MEDICINE

## 2021-12-29 PROCEDURE — 74450 X-RAY URETHRA/BLADDER: CPT

## 2021-12-29 PROCEDURE — 87077 CULTURE AEROBIC IDENTIFY: CPT | Performed by: SPECIALIST

## 2021-12-29 PROCEDURE — 84145 PROCALCITONIN (PCT): CPT | Performed by: PHYSICIAN ASSISTANT

## 2021-12-29 PROCEDURE — 87086 URINE CULTURE/COLONY COUNT: CPT | Performed by: SPECIALIST

## 2021-12-29 PROCEDURE — 99255 IP/OBS CONSLTJ NEW/EST HI 80: CPT | Performed by: INTERNAL MEDICINE

## 2021-12-29 PROCEDURE — 0T948ZX DRAINAGE OF LEFT KIDNEY PELVIS, VIA NATURAL OR ARTIFICIAL OPENING ENDOSCOPIC, DIAGNOSTIC: ICD-10-PCS | Performed by: INTERNAL MEDICINE

## 2021-12-29 PROCEDURE — 85025 COMPLETE CBC W/AUTO DIFF WBC: CPT | Performed by: NURSE PRACTITIONER

## 2021-12-29 PROCEDURE — 80048 BASIC METABOLIC PNL TOTAL CA: CPT | Performed by: NURSE PRACTITIONER

## 2021-12-29 DEVICE — URETERAL STENT
Type: IMPLANTABLE DEVICE | Status: NON-FUNCTIONAL
Brand: PERCUFLEX™ PLUS
Removed: 2022-01-14

## 2021-12-29 RX ORDER — ONDANSETRON 2 MG/ML
INJECTION INTRAMUSCULAR; INTRAVENOUS AS NEEDED
Status: DISCONTINUED | OUTPATIENT
Start: 2021-12-29 | End: 2021-12-29

## 2021-12-29 RX ORDER — FENTANYL CITRATE/PF 50 MCG/ML
25 SYRINGE (ML) INJECTION
Status: DISCONTINUED | OUTPATIENT
Start: 2021-12-29 | End: 2021-12-29 | Stop reason: HOSPADM

## 2021-12-29 RX ORDER — ROCURONIUM BROMIDE 10 MG/ML
INJECTION, SOLUTION INTRAVENOUS AS NEEDED
Status: DISCONTINUED | OUTPATIENT
Start: 2021-12-29 | End: 2021-12-29

## 2021-12-29 RX ORDER — CEFAZOLIN SODIUM 1 G/3ML
INJECTION, POWDER, FOR SOLUTION INTRAMUSCULAR; INTRAVENOUS AS NEEDED
Status: DISCONTINUED | OUTPATIENT
Start: 2021-12-29 | End: 2021-12-29

## 2021-12-29 RX ORDER — CEFEPIME HYDROCHLORIDE 2 G/50ML
2000 INJECTION, SOLUTION INTRAVENOUS EVERY 8 HOURS
Status: DISCONTINUED | OUTPATIENT
Start: 2021-12-29 | End: 2021-12-29

## 2021-12-29 RX ORDER — MAGNESIUM HYDROXIDE/ALUMINUM HYDROXICE/SIMETHICONE 120; 1200; 1200 MG/30ML; MG/30ML; MG/30ML
30 SUSPENSION ORAL EVERY 6 HOURS PRN
Status: DISCONTINUED | OUTPATIENT
Start: 2021-12-29 | End: 2021-12-31 | Stop reason: HOSPADM

## 2021-12-29 RX ORDER — CEFEPIME HYDROCHLORIDE 2 G/50ML
2000 INJECTION, SOLUTION INTRAVENOUS EVERY 8 HOURS
Status: DISCONTINUED | OUTPATIENT
Start: 2021-12-29 | End: 2021-12-31

## 2021-12-29 RX ORDER — PROPOFOL 10 MG/ML
INJECTION, EMULSION INTRAVENOUS AS NEEDED
Status: DISCONTINUED | OUTPATIENT
Start: 2021-12-29 | End: 2021-12-29

## 2021-12-29 RX ORDER — GLYCOPYRROLATE 0.2 MG/ML
INJECTION INTRAMUSCULAR; INTRAVENOUS AS NEEDED
Status: DISCONTINUED | OUTPATIENT
Start: 2021-12-29 | End: 2021-12-29

## 2021-12-29 RX ORDER — POLYETHYLENE GLYCOL 3350 17 G/17G
17 POWDER, FOR SOLUTION ORAL DAILY PRN
Status: DISCONTINUED | OUTPATIENT
Start: 2021-12-29 | End: 2021-12-31 | Stop reason: HOSPADM

## 2021-12-29 RX ORDER — ONDANSETRON 2 MG/ML
4 INJECTION INTRAMUSCULAR; INTRAVENOUS ONCE AS NEEDED
Status: DISCONTINUED | OUTPATIENT
Start: 2021-12-29 | End: 2021-12-29 | Stop reason: HOSPADM

## 2021-12-29 RX ORDER — KETAMINE HCL IN NACL, ISO-OSM 100MG/10ML
SYRINGE (ML) INJECTION AS NEEDED
Status: DISCONTINUED | OUTPATIENT
Start: 2021-12-29 | End: 2021-12-29

## 2021-12-29 RX ORDER — DEXAMETHASONE SODIUM PHOSPHATE 4 MG/ML
INJECTION, SOLUTION INTRA-ARTICULAR; INTRALESIONAL; INTRAMUSCULAR; INTRAVENOUS; SOFT TISSUE AS NEEDED
Status: DISCONTINUED | OUTPATIENT
Start: 2021-12-29 | End: 2021-12-29

## 2021-12-29 RX ORDER — LIDOCAINE HYDROCHLORIDE 20 MG/ML
INJECTION, SOLUTION EPIDURAL; INFILTRATION; INTRACAUDAL; PERINEURAL AS NEEDED
Status: DISCONTINUED | OUTPATIENT
Start: 2021-12-29 | End: 2021-12-29

## 2021-12-29 RX ORDER — MIDAZOLAM HYDROCHLORIDE 2 MG/2ML
INJECTION, SOLUTION INTRAMUSCULAR; INTRAVENOUS AS NEEDED
Status: DISCONTINUED | OUTPATIENT
Start: 2021-12-29 | End: 2021-12-29

## 2021-12-29 RX ORDER — LEVOFLOXACIN 5 MG/ML
500 INJECTION, SOLUTION INTRAVENOUS EVERY 24 HOURS
Status: DISCONTINUED | OUTPATIENT
Start: 2021-12-29 | End: 2021-12-29

## 2021-12-29 RX ORDER — DOCUSATE SODIUM 100 MG/1
100 CAPSULE, LIQUID FILLED ORAL DAILY
Status: DISCONTINUED | OUTPATIENT
Start: 2021-12-30 | End: 2021-12-31 | Stop reason: HOSPADM

## 2021-12-29 RX ORDER — SENNOSIDES 8.6 MG
1 TABLET ORAL
Status: DISCONTINUED | OUTPATIENT
Start: 2021-12-29 | End: 2021-12-31 | Stop reason: HOSPADM

## 2021-12-29 RX ADMIN — STANDARDIZED SENNA CONCENTRATE 8.6 MG: 8.6 TABLET ORAL at 22:14

## 2021-12-29 RX ADMIN — CEFEPIME HYDROCHLORIDE 2000 MG: 2 INJECTION, SOLUTION INTRAVENOUS at 12:22

## 2021-12-29 RX ADMIN — PROPOFOL 290 MG: 10 INJECTION, EMULSION INTRAVENOUS at 08:59

## 2021-12-29 RX ADMIN — CEFEPIME HYDROCHLORIDE 2000 MG: 2 INJECTION, SOLUTION INTRAVENOUS at 20:49

## 2021-12-29 RX ADMIN — SODIUM CHLORIDE, SODIUM LACTATE, POTASSIUM CHLORIDE, AND CALCIUM CHLORIDE 125 ML/HR: .6; .31; .03; .02 INJECTION, SOLUTION INTRAVENOUS at 23:53

## 2021-12-29 RX ADMIN — SODIUM CHLORIDE, SODIUM LACTATE, POTASSIUM CHLORIDE, AND CALCIUM CHLORIDE: .6; .31; .03; .02 INJECTION, SOLUTION INTRAVENOUS at 09:25

## 2021-12-29 RX ADMIN — LIDOCAINE HYDROCHLORIDE 100 MG: 20 INJECTION, SOLUTION EPIDURAL; INFILTRATION; INTRACAUDAL; PERINEURAL at 08:59

## 2021-12-29 RX ADMIN — ACETAMINOPHEN 650 MG: 325 TABLET, FILM COATED ORAL at 11:39

## 2021-12-29 RX ADMIN — MIDAZOLAM 1 MG: 1 INJECTION INTRAMUSCULAR; INTRAVENOUS at 08:50

## 2021-12-29 RX ADMIN — GLYCOPYRROLATE 0.1 MG: 0.2 INJECTION, SOLUTION INTRAMUSCULAR; INTRAVENOUS at 08:43

## 2021-12-29 RX ADMIN — PANTOPRAZOLE SODIUM 40 MG: 40 TABLET, DELAYED RELEASE ORAL at 05:30

## 2021-12-29 RX ADMIN — DEXAMETHASONE SODIUM PHOSPHATE 8 MG: 4 INJECTION, SOLUTION INTRAMUSCULAR; INTRAVENOUS at 09:31

## 2021-12-29 RX ADMIN — Medication 30 MG: at 08:57

## 2021-12-29 RX ADMIN — ONDANSETRON 4 MG: 2 INJECTION INTRAMUSCULAR; INTRAVENOUS at 09:31

## 2021-12-29 RX ADMIN — SODIUM CHLORIDE, SODIUM LACTATE, POTASSIUM CHLORIDE, AND CALCIUM CHLORIDE 125 ML/HR: .6; .31; .03; .02 INJECTION, SOLUTION INTRAVENOUS at 12:22

## 2021-12-29 RX ADMIN — MIDAZOLAM 1 MG: 1 INJECTION INTRAMUSCULAR; INTRAVENOUS at 08:43

## 2021-12-29 RX ADMIN — SUGAMMADEX 400 MG: 100 INJECTION, SOLUTION INTRAVENOUS at 09:34

## 2021-12-29 RX ADMIN — CEFAZOLIN SODIUM 3000 MG: 1 INJECTION, POWDER, FOR SOLUTION INTRAMUSCULAR; INTRAVENOUS at 09:09

## 2021-12-29 RX ADMIN — ROCURONIUM BROMIDE 50 MG: 50 INJECTION, SOLUTION INTRAVENOUS at 08:59

## 2021-12-29 NOTE — ASSESSMENT & PLAN NOTE
· Appreciate urology  · 8 mm stone left proximal ureter mild hydronephrosis  · NPO for cysto in a m    · Continue IV fluid, pain control

## 2021-12-29 NOTE — ASSESSMENT & PLAN NOTE
· Recurrent bulbar urethral stricture  · 10/2021 status post cystoscopy, dilation of stricture  · Gil catheter 10/20-11/19  · Has had no further urinary retention issues  · Urology following, appreciate reccomendations

## 2021-12-29 NOTE — ASSESSMENT & PLAN NOTE
· Creatinine 1 22    Baseline 0 6-0 8  · Likely due to stone, and sepsis  · S/P IV resusciation in the ED  · Crt continuing to rise: Most recent 1 4  · Continue IV hydration with LR at 125 mL/hr  · Hold Home Medications  · Lisinopril  · Avoid/Limit nephrotoxins  · Avoid hypotension fluctuations of blood pressure  · Continue monitor renal function

## 2021-12-29 NOTE — ASSESSMENT & PLAN NOTE
· Hold Home medications:  · Lisinopril   · Patient with ADILSON and soft blood pressure upon arrival to ED, now improved  · Will continue to monitor BP

## 2021-12-29 NOTE — PROGRESS NOTES
Progress Note - Urology      Patient: Kenyatta Prado   : 1980 Sex: male   MRN: [id number]     CSN: [id number]  Unit/Bed#: OR POOL     SUBJECTIVE:   Minimal pain  Wbc trending up  bc pos      Objective   Vitals: /58   Pulse (!) 108   Temp (!) 101 5 °F (38 6 °C) (Temporal)   Resp 18   Wt (!) 196 kg (433 lb)   SpO2 94%   BMI 60 39 kg/m²     I/O last 24 hours:   In: 2309 [IV Piggyback:2309]  Out: 500 [Urine:500]      Physical Exam:   General Alert awake   Normocephalic atraumatic PERRLA  Lungs clear bilaterally  Cardiac normal S1 normal S2  Abdomen soft, flank pain  Extremities no edema      Lab Results: CBC:   Lab Results   Component Value Date    WBC 25 34 (H) 2021    HGB 12 4 2021    HCT 38 1 2021    MCV 85 2021     2021    MCH 27 6 2021    MCHC 32 5 2021    RDW 15 2 (H) 2021    MPV 10 1 2021    NRBC 0 2021     CMP:   Lab Results   Component Value Date     2017     2021     2017    CO2 26 2021    CO2 27 2017    BUN 19 2021    BUN 14 2017    CREATININE 1 40 (H) 2021    CREATININE 0 75 2017    CALCIUM 8 0 (L) 2021    CALCIUM 9 4 2017    AST 20 2021    AST 18 2017    ALT 17 2021    ALT 18 2017    ALKPHOS 43 9 2021    ALKPHOS 46 2017    PROT 7 7 2017    BILITOT 0 5 2017    EGFR 61 2021     Urinalysis:   Lab Results   Component Value Date    COLORU Yellow 2021    COLORU YELLOW 2016    CLARITYU Clear 2021    SPECGRAV 1 025 2021    SPECGRAV 1 025 2016    PHUR 5 5 2021    PHUR 6 0 2016    LEUKOCYTESUR Trace (A) 2021    LEUKOCYTESUR NEGATIVE 2016    NITRITE Negative 2021    NITRITE NEGATIVE 2016    PROTEINUA NEGATIVE 2016    GLUCOSEU Negative 2021    KETONESU Negative 2021    KETONESU NEGATIVE 2016    BILIRUBINUR Negative 12/28/2021    BILIRUBINUR NEGATIVE 08/17/2016    BLOODU 3+ (A) 12/28/2021     Urine Culture:   Lab Results   Component Value Date    URINECX 30,000-39,000 cfu/ml Klebsiella pneumoniae (A) 11/24/2021    URINECX 30,000-39,000 cfu/ml  11/24/2021     PSA: No results found for: PSA      Assessment/ Plan:  urpsepsis  wb ctrending up 25  Left 8mm stone   Npo  Cysto/possible DVIU left stent              Sarah Che MD

## 2021-12-29 NOTE — UTILIZATION REVIEW
Initial Clinical Review    OBSERVATION 12/28 @ 2006 - CHANGED TO INPATIENT ON 12/29 @ 1218 - SEPSIS AND GRAM NEG BACTEREMIA     Admission: Date/Time/Statement:   Inpatient Admission Orders (From admission, onward)     Ordered        12/29/21 1218  Inpatient Admission  Once                      Orders Placed This Encounter   Procedures    Inpatient Admission     Standing Status:   Standing     Number of Occurrences:   1     Order Specific Question:   Level of Care     Answer:   Med Surg [16]     Order Specific Question:   Estimated length of stay     Answer:   More than 2 Midnights     Order Specific Question:   Certification     Answer:   I certify that inpatient services are medically necessary for this patient for a duration of greater than two midnights  See H&P and MD Progress Notes for additional information about the patient's course of treatment  ED Arrival Information     Expected Arrival Acuity    - 12/28/2021 12:31 Emergent         Means of arrival Escorted by Service Admission type    Ambulance [facility] Emergency         Arrival complaint    lower back and abdominal pain, fever        Chief Complaint   Patient presents with    Abdominal Pain     lower abdominal pain and lower back pain beginning yesterday  reports left lower worse  temp at home 99  Initial Presentation:  Mr Scott Santos is a 35 yom who presents to the ED via EMS from home with c/o L flank pain, L abd pain, chills, fever 101 3F  PMH:  HTN, GERD, uretheral stricture  In the ED imaging shows  8 x 6 x 5 mm calculus in the proximal ureter resulting MILD HYDRONEPHROSIS  Additional 5 mm nonobstructing stones/stone fragments in the LEFT lower pole  He meets SIRS criteria on admission with tachycardia, leukocytosis 15 98, tachypnea  He was seen by Urology with plans for operative intervention 12/29  He is admitted to OBSERVATION status with SIRS - IV fluids, IV antibiotics, blood and urine cultures   L ureteral stone - NPO for cysto in AM, IV analgesia  ADILSON -creat 1 22 -  IV fluids, hold Lisinopril  Constipation - Colace PRN  Ureteral stricture - had desouza  HTN - holding Lisinopril  BP soft in ED, now improved  12/28 Urology Consult - acute onset left flank pain, h/o kidney stone, found on spiral CT scan to have a 6 x 8 mm left proximal ureteral stone  Had recent admission over [facility] for acute cystitis and had cysto  In recent f/u ws found to have hypocontractile bladder retaining urine  Left 9 mm proximal ureteral stone has a 15% chance of passing a medical expulsion therapy, Urinalysis consistent with questionable UTI, Leukocytosis possible anxiety or evolving sepsis - urine and blood cultures, IV antibiotics, NPO p MN for cysto  Date: 12/29   Day 2:   Pt is febrile this morning  Plan for OR today, NPO   WBC increased to 25  Continues on IV fluids, IV antibiotics  Pt found to have gram negative bacteremia  Continue IV fluids  Pt tolerated cysto and stent placement well       12/29 ID Consult - Sepsis with + blood culture in 2/2 sets likely d/t nephrolithiasis with mild hydronephrosis  Changed IV antibiotics to Cefepime 2 GM q 8 hr , d/c Ceftriaxone, follow repeat blood cultures, urine culture, make adjustments based on culture results       +++++++++++++++++++++++++++++++  12/29 OPERATIVE NOTE    Preop Diagnosis:  Ureterolithiasis [N20 1]  Sepsis  Left pyelonephrosis     Post-Op Diagnosis Codes:     * Ureterolithiasis [N20 1]  Sepsis  Of pyonephrosis     Procedure(s) (LRB):  CYSTOSCOPY RETROGRADE PYELOGRAM WITH INSERTION STENT URETERAL possible DVIU left retrograde pyelogram (Left)    Anesthesia: General     Operative Findings:  Mild bulbar stricture opened left retrograde confirming mild hydro left selective urine culture    26 cm stent placed 22 [place] Desouza catheter lef   +++++++++++++++++++++++++++++++    ED Triage Vitals [12/28/21 1307]   Temperature Pulse Respirations Blood Pressure SpO2   (!) 101 3 °F (38 5 °C) (!) 121 (!) 40 143/75 99 %      Temp Source Heart Rate Source Patient Position - Orthostatic VS BP Location FiO2 (%)   Oral Monitor Sitting Left arm --      Pain Score       3          Wt Readings from Last 1 Encounters:   12/28/21 (!) 196 kg (433 lb)     Additional Vital Signs:   12/29/21 0740 101 5 °F (38 6 °C) Abnormal  108 Abnormal  18 131/58 94 % None (Room air) --   12/29/21 0000 -- 94 18 162/79 97 % -- --   12/28/21 2000 -- 97 20 109/52 95 % None (Room air) Lying   12/28/21 1923 -- -- -- 116/54 -- -- --   12/28/21 1847 97 7 °F (36 5 °C) 101 16 92/33 Abnormal  94 % None (Room air) --     Pertinent Labs/Diagnostic Test Results:     12/28 CXR - No active pulmonary disease on examination which is somewhat limited secondary to low lung volumes  In the setting of clinically suspected/proven COVID-19, this plain film appearance does not contain findings that raise concern for viral pneumonia such as COVID-19, but does not rule out this diagnosis  12/28 CT AP - Exam is significantly LIMITED both by motion artifact, respiratory motion artifact and out of field artifact from patient's large body habitus  Prominent pulmonary vasculature  Hepatosplenomegaly  Prior cholecystectomy without biliary duct dilatation  Bilateral adrenal body nodules stable from previous exam   Could not obtain accurate attenuation measurements = could not confirm adenomas  Grossly stable right kidney with no hydronephrosis  LEFT SIDE RENAL COLIC with 8 x 6 x 5 mm calculus in the proximal ureter resulting MILD HYDRONEPHROSIS  Additional 5 mm nonobstructing stones/stone fragments in the LEFT lower pole  UROLOGY CONSULTATION ADVISED  Moderate amounts of formed stool the right colon  No obstruction  Rare colonic diverticula  No evidence of diverticulitis or colitis    Prominent but not pathologically enlarged retroperitoneal nodes stable from previous exam   URINARY BLADDER WALL THICKENING which may be due to trabeculation/incomplete distention  CYSTITIS could also cause this appearance although there is no perivesicular inflammation to suggest that    Please correlate with urinalysis      Results from last 7 days   Lab Units 12/28/21  1648   SARS-COV-2  Negative     Results from last 7 days   Lab Units 12/29/21  0445 12/28/21  1555   WBC Thousand/uL 25 34* 15 98*   HEMOGLOBIN g/dL 12 4 13 8   HEMATOCRIT % 38 1 42 5   PLATELETS Thousands/uL 206 239   NEUTROS ABS Thousands/µL 21 73* 14 39*         Results from last 7 days   Lab Units 12/29/21  0445 12/28/21  1555   SODIUM mmol/L 137 132*   POTASSIUM mmol/L 3 5 3 8   CHLORIDE mmol/L 102 96   CO2 mmol/L 26 28   ANION GAP mmol/L 9 8   BUN mg/dL 19 17   CREATININE mg/dL 1 40* 1 22*   EGFR ml/min/1 73sq m 61 72   CALCIUM mg/dL 8 0* 9 3     Results from last 7 days   Lab Units 12/28/21  1555   AST U/L 20   ALT U/L 17   ALK PHOS U/L 43 9   TOTAL PROTEIN g/dL 8 0   ALBUMIN g/dL 4 0   TOTAL BILIRUBIN mg/dL 0 72         Results from last 7 days   Lab Units 12/29/21  0445 12/28/21  1555   GLUCOSE RANDOM mg/dL 99 102     Results from last 7 days   Lab Units 12/28/21  1555   PROTIME seconds 14 5   INR  1 14   PTT seconds 33         Results from last 7 days   Lab Units 12/28/21  1555   PROCALCITONIN ng/ml 0 91*     Results from last 7 days   Lab Units 12/28/21  1555   LACTIC ACID mmol/L 2 0     Results from last 7 days   Lab Units 12/28/21  1555   LIPASE u/L 17     Results from last 7 days   Lab Units 12/28/21  1722   CLARITY UA  Clear   COLOR UA  Yellow   SPEC GRAV UA  1 025   PH UA  5 5   GLUCOSE UA mg/dl Negative   KETONES UA mg/dl Negative   BLOOD UA  3+*   PROTEIN UA mg/dl Negative   NITRITE UA  Negative   BILIRUBIN UA  Negative   UROBILINOGEN UA E U /dl 0 2   LEUKOCYTES UA  Trace*   WBC UA /hpf 10-20*   RBC UA /hpf 30-50*   BACTERIA UA /hpf Moderate*   EPITHELIAL CELLS WET PREP /hpf Occasional     Results from last 7 days   Lab Units 12/28/21  1648   INFLUENZA A PCR  Negative INFLUENZA B PCR  Negative   RSV PCR  Negative     Results from last 7 days   Lab Units 12/28/21  1555   GRAM STAIN RESULT  Gram negative rods*  Gram negative rods*     ED Treatment:   Medication Administration from 12/28/2021 1230 to 12/29/2021 0724    Date/Time Order Dose Route Action   12/28/2021 1645 sodium chloride 0 9 % bolus 1,000 mL 1,000 mL Intravenous New Bag   12/28/2021 1721 sodium chloride 0 9 % bolus 1,000 mL 1,000 mL Intravenous New Bag   12/28/2021 1847 sodium chloride 0 9 % bolus 1,000 mL 1,000 mL Intravenous New Bag   12/28/2021 1648 cefTRIAXone (ROCEPHIN) IVPB (premix in dextrose) 2,000 mg 50 mL 2,000 mg Intravenous New Bag   12/28/2021 1646 acetaminophen (TYLENOL) tablet 650 mg 650 mg Oral Given   12/28/2021 1646 ibuprofen (MOTRIN) tablet 600 mg 600 mg Oral Given   12/28/2021 1803 iohexol (OMNIPAQUE) 350 MG/ML injection (SINGLE-DOSE) 100 mL 100 mL Intravenous Given   12/28/2021 1927 sodium chloride 0 9 % bolus 1,000 mL 1,000 mL Intravenous New Bag   12/28/2021 2150 docusate sodium (COLACE) capsule 100 mg 100 mg Oral Given   12/29/2021 0530 pantoprazole (PROTONIX) EC tablet 40 mg 40 mg Oral Given   12/28/2021 2348 lactated ringers infusion 125 mL/hr Intravenous New Bag        Past Medical History:   Diagnosis Date    Anxiety     GERD (gastroesophageal reflux disease)     Hypertension     Urethral stricture      Present on Admission:   SIRS (systemic inflammatory response syndrome) (HCC)   Urethral stricture   HTN (hypertension)    Admitting Diagnosis: Ureterolithiasis [N20 1]  Back pain [M54 9]  Leukocytosis [D72 829]  UTI (urinary tract infection) [N39 0]  Abdominal pain [R10 9]  Fever [R50 9]  Age/Sex: 35 y o  male  Admission Orders:  Scheduled Medications:  cefTRIAXone, 1,000 mg, Intravenous, Q24H  pantoprazole, 40 mg, Oral, Early Morning      Continuous IV Infusions:  lactated ringers, 125 mL/hr, Intravenous, Continuous      PRN Meds:  acetaminophen, 650 mg, Oral, Q6H PRN  [MAR Hold] docusate sodium, 100 mg, Oral, BID PRN -x 1 12/28  HYDROmorphone, 0 5 mg, Intravenous, Q6H PRN  methocarbamol, 500 mg, Oral, Q6H PRN  oxyCODONE, 5 mg, Oral, Q6H PRN  senna, 2 tablet, Oral, HS PRN    SCDs  NPO for OR  Procalcitonin  Follow cultures  IP CONSULT TO INFECTIOUS DISEASES    Network Utilization Review Department  ATTENTION: Please call with any questions or concerns to [phone] and carefully listen to the prompts so that you are directed to the right person  All voicemails are confidential   Lynn Collazo all requests for admission clinical reviews, approved or denied determinations and any other requests to dedicated fax number below belonging to the campus where the patient is receiving treatment   List of dedicated fax numbers for the Facilities:  [address] DENIALS (Administrative/Medical Necessity) [phone]   [address] (Maternity/NICU/Pediatrics) [phone]   [address]  [phone] [id number] [place] [address] [address] [facility] [place] [address] [phone]

## 2021-12-29 NOTE — CONSULTS
H&P Exam - Urology       Patient: Miriam Jacobs   : 1980 Sex: male   MRN: [id number]     CSN: [id number]      History of Present Illness   HPI:  Miriam Jacobs is a 35 y o  male who presents with acute onset left flank pain seen in the ER at [address] [place] [address] found on spiral CT scan to have a 6 x 8 mm left proximal ureteral stone  Patient seen in the ER known to me with history of kidney stones initially seen by me some 21 years ago from recent admission over Thanksgiving weekend for acute cystitis status post cysto DVIU by Dr Mauri Meyer recently seen on follow-up by nurse practitioner told that he has hypocontractile bladder retaining urine patient at this point has no flank pain after analgesics denies dysuria hematuria        Review of Systems:   Constitutional:  Negative for activity change, fever, chills and diaphoresis  HENT: Negative for hearing loss and trouble swallowing  Eyes: Negative for itching and visual disturbance  Respiratory: Negative for chest tightness and shortness of breath  Cardiovascular: Negative for chest pain, edema  Gastrointestinal: Negative for abdominal distention, na abdominal pain, constipation, diarrhea, Nausea and vomiting  Genitourinary: Negative for decreased urine volume, difficulty urinating, dysuria, enuresis, frequency, hematuria and urgency  Musculoskeletal: Negative for gait problem and myalgias  Neurological: Negative for dizziness and headaches  Hematological: Does not bruise/bleed easily         Historical Information   Past Medical History:   Diagnosis Date    Anxiety     GERD (gastroesophageal reflux disease)     Hypertension     Urethral stricture      Past Surgical History:   Procedure Laterality Date    CYSTOSCOPY N/A 10/19/2021    Procedure: Alex Madrid;  Surgeon: Michael Gardner MD;  [facility]: [facility];  Service: Urology    HI CYSTOSCOPY,DIR VIS INT URETHROTOMY N/A 10/19/2021    Procedure: DIRECT VISUAL INTERAL URETHROTOMY (DVIU); Surgeon: Ronnie Nam MD;  Location: [facility];  Service: Urology     Social History   Social History     Substance and Sexual Activity   Alcohol Use Yes    Comment: rarely     Social History     Substance and Sexual Activity   Drug Use Never     Social History     Tobacco Use   Smoking Status Former Smoker    Types: Cigarettes   Smokeless Tobacco Never Used     Family History: History reviewed  No pertinent family history  Meds/Allergies   (Not in a hospital admission)    No Known Allergies    Objective   Vitals: /54   Pulse 101   Temp 97 7 °F (36 5 °C)   Resp 16   Wt (!) 196 kg (433 lb)   SpO2 94%   BMI 60 39 kg/m²     Physical Exam:  General Alert awake   Normocephalic atraumatic PERRLA  Lungs clear bilaterally  Cardiac normal S1 normal S2  Abdomen soft, flank pain  Morbidly obese  Mild left flank  Extremities no edema    I/O last 24 hours:   In: 1050 [IV Piggyback:1050]  Out: -     Invasive Devices  Report    Peripheral Intravenous Line            Peripheral IV 12/28/21 Left Antecubital <1 day    Peripheral IV 12/28/21 Right Antecubital <1 day                    Lab Results: CBC:   Lab Results   Component Value Date    WBC 15 98 (H) 12/28/2021    HGB 13 8 12/28/2021    HCT 42 5 12/28/2021    MCV 84 12/28/2021     12/28/2021    MCH 27 2 12/28/2021    MCHC 32 5 12/28/2021    RDW 14 7 12/28/2021    MPV 9 8 12/28/2021    NRBC 0 12/28/2021     CMP:   Lab Results   Component Value Date     05/01/2017    CL 96 12/28/2021     05/01/2017    CO2 28 12/28/2021    CO2 27 05/01/2017    BUN 17 12/28/2021    BUN 14 05/01/2017    CREATININE 1 22 (H) 12/28/2021    CREATININE 0 75 05/01/2017    CALCIUM 9 3 12/28/2021    CALCIUM 9 4 05/01/2017    AST 20 12/28/2021    AST 18 05/01/2017    ALT 17 12/28/2021    ALT 18 05/01/2017    ALKPHOS 43 9 12/28/2021    ALKPHOS 46 05/01/2017    PROT 7 7 05/01/2017    BILITOT 0 5 05/01/2017    EGFR 72 12/28/2021     Urinalysis:   Lab Results   Component Value Date    COLORU Yellow 12/28/2021    COLORU YELLOW 08/17/2016    CLARITYU Clear 12/28/2021    SPECGRAV 1 025 12/28/2021    SPECGRAV 1 025 08/17/2016    PHUR 5 5 12/28/2021    PHUR 6 0 08/17/2016    LEUKOCYTESUR Trace (A) 12/28/2021    LEUKOCYTESUR NEGATIVE 08/17/2016    NITRITE Negative 12/28/2021    NITRITE NEGATIVE 08/17/2016    PROTEINUA NEGATIVE 08/17/2016    GLUCOSEU Negative 12/28/2021    KETONESU Negative 12/28/2021    KETONESU NEGATIVE 08/17/2016    BILIRUBINUR Negative 12/28/2021    BILIRUBINUR NEGATIVE 08/17/2016    BLOODU 3+ (A) 12/28/2021     Urine Culture:   Lab Results   Component Value Date    URINECX 30,000-39,000 cfu/ml Klebsiella pneumoniae (A) 11/24/2021    URINECX 30,000-39,000 cfu/ml  11/24/2021     PSA: No results found for: PSA        Assessment/ Plan:  Left 9 mm proximal ureteral stone has a 15% chance of passing a medical expulsion therapy  Urinalysis consistent with questionable UTI  Leukocytosis possible anxiety or evolving sepsis  Plan  Urine cultures blood cultures  Rocephin  NPO after midnight cysto left stent in the morning  Patient aware that stone need to be addressed after over urinary tract infection      John Barillas MD

## 2021-12-29 NOTE — OP NOTE
PERATIVE REPORT  PATIENT NAME: Ashkan Bolivar    :  1980  MRN: [id number]  Pt Location:  OR ROOM 01    SURGERY DATE: 2021    Surgeon(s) and Role:     * Clarita Martinez MD - Primary    Preop Diagnosis:  Ureterolithiasis [N20 1]  Sepsis  Left pyelonephrosis    Post-Op Diagnosis Codes:     * Ureterolithiasis [N20 1]  Sepsis  Of pyonephrosis    Procedure(s) (LRB):  CYSTOSCOPY RETROGRADE PYELOGRAM WITH INSERTION STENT URETERAL possible DVIU left retrograde pyelogram (Left)    Specimen(s):  ID Type Source Tests Collected by Time Destination   A : URINE LEFT CULTURE Urine Urine, Renal, Left URINE CULTURE Clarita Martinez MD 2021 0932        Estimated Blood Loss:   5 mL    Drains:  * No LDAs found *    Anesthesia Type:   General/LMA    Operative Indications:  Ureterolithiasis [N20 1]  This 24-year-old morbidly obese male presented to [facility] in ER yesterday with a 24 hour complaint of left flank pain  Patient has complicated urologic history followed by Dr Stacie Gar undergoing DVIU few months ago found on CT scan to have bilateral stones  Patient last seen by me some 20 years ago for stones and recently went admitted to [facility] in over  weekend for complicated UTI after DVIU ER CT scan confirming left 8 mm proximal stone urine analysis confirming UTI with white count of 15    Patient initially hypotensive in the ER and responding to IV fluids admitted made NPO and now to go to the OR for cysto left stent white count trending up to 25,000    Operative Findings:  Mild bulbar stricture opened left retrograde confirming mild hydro left selective urine culture  26 cm stent placed 22 [place] Gil catheter lef    Complications:   None    Procedure and Technique:  After patient identified in the holding area left side marked consent signed he was placed in the op suite after anesthesia was induced he was placed in lithotomy position draped prep standard fashion time-out performed  A head 22 Mauritian cystoscope with 30 lens was passed through urethra anterior confirmed mild proximal bulbar urethral stricture x2 the cystoscope was used to dilate the stricture to some extent posterior urethra confirmed a 2 5 cm bilobar nonobstructing prostatic urethra scope inserted the bladder lumen with pyuria urine noted    A 0 038 wire was passed up the left orifice under fluoroscopic control which appeared to be in the left renal pelvis a 5 Mauritian catheter was placed urine culture taken from the left renal pelvis mild retrograde confirmed to be in the renal pelvis the wire was replaced the catheter removed and over the wire 26 cm 6 Mauritian stent was passed the wire removed scope removed urethra tome placed and the proximal bulbar stricture opened minimally 22 Mauritian Gil catheter was inserted with difficulty but finally by the stricture into the bladder 10 cc in the balloon patient was awakened taken recovery room stable condition   I was present for the entire procedure    Patient Disposition:  PACU       SIGNATURE: Leonila Olivera MD  DATE: December 29, 2021  TIME: 9:41 AM

## 2021-12-29 NOTE — ASSESSMENT & PLAN NOTE
· Recurrent bulbar urethral stricture  · 10/2021 status post cystoscopy, dilation of stricture  · Gil catheter 10/20/2011/19  · Has had no further urinary retention issues

## 2021-12-29 NOTE — PLAN OF CARE
Problem: GENITOURINARY - ADULT  Goal: Maintains or returns to baseline urinary function  Description: INTERVENTIONS:  - Assess urinary function  - Encourage oral fluids to ensure adequate hydration if ordered  - Administer IV fluids as ordered to ensure adequate hydration  - Administer ordered medications as needed  - Offer frequent toileting  - Follow urinary retention protocol if ordered  Outcome: Not Progressing     Problem: INFECTION - ADULT  Goal: Absence or prevention of progression during hospitalization  Description: INTERVENTIONS:  - Assess and monitor for signs and symptoms of infection  - Monitor lab/diagnostic results  - Monitor all insertion sites, i e  indwelling lines, tubes, and drains  - Administer medications as ordered  - Instruct and encourage patient and family to use good hand hygiene technique  Outcome: Not Progressing     Problem: INFECTION - ADULT  Goal: Absence of fever/infection during neutropenic period  Description: INTERVENTIONS:  - Monitor WBC    Outcome: Not Progressing

## 2021-12-29 NOTE — H&P
Kathya 43  H&P- Myna Riis 1980, 35 y o  male MRN: [id number]  Unit/Bed#: ED 13 Encounter: [id number]  Primary Care Provider: Deyanira Dudley DO   Date and time admitted to hospital: 12/28/2021  2:54 PM    * SIRS (systemic inflammatory response syndrome) ([facility])  Assessment & Plan   SIRS criteria:  Fever, tachycardia, tachypnea, leukocytosis   Suspected source:  Infected stone   Lactic acid:  Normal   End organ damage: ADILSON   IV Fluids:  Sepsis fluids in ED  Continue with LR at 1:25 a m    IV antibiotics:  Rocephin   Blood culture, urine culture pending    Left ureteral stone  Assessment & Plan  · Appreciate urology  · 8 mm stone left proximal ureter mild hydronephrosis  · NPO for cysto in a m  · Continue IV fluid, pain control    ADILSON (acute kidney injury) ([facility] [address] )  Assessment & Plan  · Creatinine 1 22  Baseline 0 6-0 8  · Likely due to stone, sepsis  · Continue IV fluids  · Hold lisinopril    Constipation  Assessment & Plan  · Moderate stool noted on imaging  · Colace p r n  Urethral stricture  Assessment & Plan  · Recurrent bulbar urethral stricture  · 10/2021 status post cystoscopy, dilation of stricture  · Gil catheter 10/20/2011/19  · Has had no further urinary retention issues    HTN (hypertension)  Assessment & Plan  · Hold lisinopril preprocedure and in setting of ADILSON  · Also with soft blood pressure upon arrival to ED, now improved    VTE Pharmacologic Prophylaxis: VTE Score: 5 High Risk (Score >/= 5) - Pharmacological DVT Prophylaxis Contraindicated  Sequential Compression Devices Ordered  Code Status: Prior full   Discussion with family: Patient declined call to   Anticipated Length of Stay: Patient will be admitted on an observation basis with an anticipated length of stay of less than 2 midnights secondary to stone, urology intervention, iv abx      Total Time for Visit, including Counseling / Coordination of Care: 70 minutes Greater than 50% of this total time spent on direct patient counseling and coordination of care  Chief Complaint:  Left flank pain and abdominal pain    History of Present Illness:  Yeison Lao is a 35 y o  male with a PMH of hypertension, GERD, urethral stricture, obesity who presents with left-sided flank pain and left abdominal pain  Meet SIRS criteria upon arrival to ED  Imaging as below  Evaluated by Urology with plans for OR tomorrow  Keep NPO  Continue IV fluid and Rocephin  Review of Systems:  Review of Systems   Constitutional: Positive for chills and fever  Gastrointestinal: Positive for abdominal pain  Genitourinary: Positive for flank pain  Negative for difficulty urinating, dysuria and hematuria  All other systems reviewed and are negative  Past Medical and Surgical History:   Past Medical History:   Diagnosis Date    Anxiety     GERD (gastroesophageal reflux disease)     Hypertension     Urethral stricture        Past Surgical History:   Procedure Laterality Date    CYSTOSCOPY N/A 10/19/2021    Procedure: CYSTOSCOPY;  Surgeon: Baron Chicas MD;  Location: [facility];  Service: Urology    OK CYSTOSCOPY,DIR VIS INT URETHROTOMY N/A 10/19/2021    Procedure: DIRECT VISUAL INTERAL URETHROTOMY (DVIU); Surgeon: Baron Chicas MD;  Location: [facility];  Service: Urology       Meds/Allergies:  Prior to Admission medications    Medication Sig Start Date End Date Taking? Authorizing Provider   lisinopril (ZESTRIL) 40 mg tablet Take 1 tablet (40 mg total) by mouth daily 3/8/18   JANINE Jesus   omeprazole (PriLOSEC) 20 mg delayed release capsule Take 1 capsule (20 mg total) by mouth daily 5/31/18   Kelly Christian DO   oxybutynin (DITROPAN) 5 mg tablet Take 1 tablet (5 mg total) by mouth 3 (three) times a day for 5 days Resume after completion of antibiotic course as per Urology   11/27/21 12/2/21  Nain Blanco MD     I have reviewed home medications with patient personally  Allergies: No Known Allergies    Social History:  Marital Status: Single   Occupation:   Patient Pre-hospital Living Situation: Home  Patient Pre-hospital Level of Mobility: walks  Patient Pre-hospital Diet Restrictions:   Substance Use History:   Social History     Substance and Sexual Activity   Alcohol Use Yes    Comment: rarely     Social History     Tobacco Use   Smoking Status Former Smoker    Types: Cigarettes   Smokeless Tobacco Never Used     Social History     Substance and Sexual Activity   Drug Use Never       Family History:  History reviewed  No pertinent family history  Physical Exam:     Vitals:   Blood Pressure: 109/52 (12/28/21 2000)  Pulse: 97 (12/28/21 2000)  Temperature: 97 7 °F (36 5 °C) (12/28/21 1847)  Temp Source: Oral (12/28/21 1307)  Respirations: 20 (12/28/21 2000)  Weight - Scale: (!) 196 kg (433 lb) (12/28/21 1307)  SpO2: 95 % (12/28/21 2000)    Physical Exam  Constitutional:       General: He is not in acute distress  Appearance: Normal appearance  He is obese  He is not ill-appearing  HENT:      Head: Normocephalic and atraumatic  Right Ear: External ear normal       Left Ear: External ear normal       Nose: Nose normal       Mouth/Throat:      Pharynx: Oropharynx is clear  Eyes:      General: No scleral icterus  Extraocular Movements: Extraocular movements intact  Conjunctiva/sclera: Conjunctivae normal    Cardiovascular:      Rate and Rhythm: Normal rate and regular rhythm  Pulses: Normal pulses  Heart sounds: Normal heart sounds  Comments: Systolic blood pressure 412  Pulmonary:      Effort: Pulmonary effort is normal       Breath sounds: Normal breath sounds  Abdominal:      General: Bowel sounds are normal  There is no distension  Palpations: Abdomen is soft  Tenderness: There is no abdominal tenderness  There is no right CVA tenderness, left CVA tenderness, guarding or rebound     Musculoskeletal: General: Normal range of motion  Cervical back: Normal range of motion  No rigidity  Skin:     General: Skin is warm and dry  Capillary Refill: Capillary refill takes less than 2 seconds  Neurological:      General: No focal deficit present  Mental Status: He is oriented to person, place, and time  Psychiatric:         Mood and Affect: Mood normal          Behavior: Behavior normal           Additional Data:     Lab Results:  Results from last 7 days   Lab Units 12/28/21  1555   WBC Thousand/uL 15 98*   HEMOGLOBIN g/dL 13 8   HEMATOCRIT % 42 5   PLATELETS Thousands/uL 239   NEUTROS PCT % 90*   LYMPHS PCT % 4*   MONOS PCT % 6   EOS PCT % 0     Results from last 7 days   Lab Units 12/28/21  1555   SODIUM mmol/L 132*   POTASSIUM mmol/L 3 8   CHLORIDE mmol/L 96   CO2 mmol/L 28   BUN mg/dL 17   CREATININE mg/dL 1 22*   ANION GAP mmol/L 8   CALCIUM mg/dL 9 3   ALBUMIN g/dL 4 0   TOTAL BILIRUBIN mg/dL 0 72   ALK PHOS U/L 43 9   ALT U/L 17   AST U/L 20   GLUCOSE RANDOM mg/dL 102     Results from last 7 days   Lab Units 12/28/21  1555   INR  1 14             Results from last 7 days   Lab Units 12/28/21  1555   LACTIC ACID mmol/L 2 0       Imaging: Reviewed radiology reports from this admission including: abdominal/pelvic CT  CT abdomen pelvis with contrast   Final Result by Lili Mayen DO (12/28 1846)   Exam is significantly LIMITED both by motion artifact, respiratory motion artifact and out of field artifact from patient's large body habitus  Prominent pulmonary vasculature  Hepatosplenomegaly  Prior cholecystectomy without biliary duct dilatation  Bilateral adrenal body nodules stable from previous exam   Could not obtain accurate attenuation measurements = could not confirm adenomas  Grossly stable right kidney with no hydronephrosis  LEFT SIDE RENAL COLIC with 8 x 6 x 5 mm calculus in the proximal ureter resulting MILD HYDRONEPHROSIS    Additional 5 mm nonobstructing stones/stone fragments in the LEFT lower pole  UROLOGY CONSULTATION ADVISED  Moderate amounts of formed stool the right colon  No obstruction  Rare colonic diverticula  No evidence of diverticulitis or colitis  Prominent but not pathologically enlarged retroperitoneal nodes stable from previous exam       URINARY BLADDER WALL THICKENING which may be due to trabeculation/incomplete distention  CYSTITIS could also cause this appearance although there is no perivesicular inflammation to suggest that  Please correlate with urinalysis  The study was marked in [facility] for immediate notification  Workstation performed: [id number]         XR chest portable   Final Result by Danny Carson MD (12/28 1659)      No active pulmonary disease on examination which is somewhat limited secondary to low lung volumes  In the setting of clinically suspected/proven COVID-19, this plain film appearance does not contain findings that raise concern for viral pneumonia such    as COVID-19, but does not rule out this diagnosis  Workstation performed: [id number]             EKG and Other Studies Reviewed on Admission:   · EKG: No EKG obtained  ** Please Note: This note has been constructed using a voice recognition system   **

## 2021-12-29 NOTE — ASSESSMENT & PLAN NOTE
· Hold lisinopril preprocedure and in setting of ADILSON  · Also with soft blood pressure upon arrival to ED, now improved

## 2021-12-29 NOTE — ASSESSMENT & PLAN NOTE
 SIRS criteria:  Fever, tachycardia, tachypnea, leukocytosis   End organ damage: ADILSON   Suspect secondary to Left ureteral stone   Lactic acid:  Normal   Procal: 0 91   Blood Cultures: +Gram negative Rods   Urine Cultures: Pending   IV Fluids:  S/P Sepsis fluid resuscitation in ED    Continue with LR at 125ml/hr   Continue IV antibiotics with Rocephin (Day 2)   S/P OR for Csyto with ureteral Stent placement on 12/29   Patient febrile, tachycardic, with worsening leukocytosis   Will consult Infectious disease   Continue to monitor for worsening signs of infection

## 2021-12-29 NOTE — ASSESSMENT & PLAN NOTE
· CT Abd: 8 mm stone left proximal ureter mild hydronephrosis    · S/p cysto with ureteral stent placement 12/29  · Continue IV hydration with LR at 125 mL/hr  · Continue Pain management  · Urology following, appreciate recommendations  · Supportive care

## 2021-12-29 NOTE — ASSESSMENT & PLAN NOTE
· Creatinine 1 22    Baseline 0 6-0 8  · Likely due to stone, sepsis  · Continue IV fluids  · Hold lisinopril

## 2021-12-29 NOTE — ASSESSMENT & PLAN NOTE
 SIRS criteria:  Fever, tachycardia, tachypnea, leukocytosis   Suspected source:  Infected stone   Lactic acid:  Normal   End organ damage: ADILSON   IV Fluids:  Sepsis fluids in ED    Continue with LR at 1:25 a m    IV antibiotics:  Rocephin   Blood culture, urine culture pending

## 2021-12-29 NOTE — PROGRESS NOTES
Kathya 43  Progress Note - Stiven Bodily 1980, 35 y o  male MRN: [id number]  Unit/Bed#: -01 Encounter: [id number]  Primary Care Provider: Hilary Torres DO   Date and time admitted to hospital: 12/28/2021  2:54 PM    * SIRS (systemic inflammatory response syndrome) ([facility])  Assessment & Plan   SIRS criteria:  Fever, tachycardia, tachypnea, leukocytosis   End organ damage: ADILSON   Suspect secondary to Left ureteral stone   Lactic acid:  Normal   Procal: 0 91   Blood Cultures: +Gram negative Rods   Urine Cultures: Pending   IV Fluids:  S/P Sepsis fluid resuscitation in ED  Continue with LR at 125ml/hr   Continue IV antibiotics with Rocephin (Day 2)   S/P OR for Csyto with ureteral Stent placement on 12/29   Patient febrile, tachycardic, with worsening leukocytosis   Will consult Infectious disease   Continue to monitor for worsening signs of infection    ADILSON (acute kidney injury) ([facility] [address] )  Assessment & Plan  · Creatinine 1 22  Baseline 0 6-0 8  · Likely due to stone, and sepsis  · S/P IV resusciation in the ED  · Crt continuing to rise: Most recent 1 4  · Continue IV hydration with LR at 125 mL/hr  · Hold Home Medications  · Lisinopril  · Avoid/Limit nephrotoxins  · Avoid hypotension fluctuations of blood pressure  · Continue monitor renal function    Left ureteral stone  Assessment & Plan  · CT Abd: 8 mm stone left proximal ureter mild hydronephrosis    · S/p cysto with ureteral stent placement 12/29  · Continue IV hydration with LR at 125 mL/hr  · Continue Pain management  · Urology following, appreciate recommendations  · Supportive care    Urethral stricture  Assessment & Plan  · Recurrent bulbar urethral stricture  · 10/2021 status post cystoscopy, dilation of stricture  · Gil catheter 10/20-11/19  · Has had no further urinary retention issues  · Urology following, appreciate reccomendations    Constipation  Assessment & Plan  · Moderate stool noted on imaging  · Will add bowel regimen      HTN (hypertension)  Assessment & Plan  · Hold Home medications:  · Lisinopril   · Patient with ADILSON and soft blood pressure upon arrival to ED, now improved  · Will continue to monitor BP      VTE Pharmacologic Prophylaxis: VTE Score: 6 High Risk (Score >/= 5) - Pharmacological DVT Prophylaxis Ordered: enoxaparin (Lovenox)  Sequential Compression Devices Ordered  Patient Centered Rounds: I performed bedside rounds with nursing staff today  Discussions with Specialists or Other Care Team Provider: Case Management, Urology, Infectious Disease    Education and Discussions with Family / Patient: Updated  (mother) via phone  Time Spent for Care: 45 minutes  More than 50% of total time spent on counseling and coordination of care as described above  Current Length of Stay: 0 day(s)  Current Patient Status: Observation   Certification Statement: The patient will continue to require additional inpatient hospital stay due to Sepsis requiring IV antibiotics  Discharge Plan: Anticipate discharge in 48-72 hrs to home  Code Status: Level 1 - Full Code    Subjective:   Patient states he feels Lemuel Colby today  Patient concern resting in the hospital longer than Thursday  Patient states he has never had a problem like this in the past   Patient denies any active chest pain, shortness of breath, abdominal pain, nausea, vomiting, or diarrhea  Objective:     Vitals:   Temp (24hrs), Av °F (37 8 °C), Min:97 7 °F (36 5 °C), Max:103 °F (39 4 °C)    Temp:  [97 7 °F (36 5 °C)-103 °F (39 4 °C)] 103 °F (39 4 °C)  HR:  [] 106  Resp:  [16-40] 25  BP: ()/(33-79) 131/62  SpO2:  [92 %-99 %] 92 %  Body mass index is 60 39 kg/m²  Input and Output Summary (last 24 hours):      Intake/Output Summary (Last 24 hours) at 2021 1211  Last data filed at 2021 0943  Gross per 24 hour   Intake 3709 ml   Output 505 ml   Net 3204 ml       Physical Exam:   Physical Exam  Constitutional:       General: He is not in acute distress  Appearance: He is obese  He is ill-appearing  HENT:      Head: Normocephalic  Nose: Nose normal  No congestion  Mouth/Throat:      Mouth: Mucous membranes are moist       Pharynx: Oropharynx is clear  Cardiovascular:      Rate and Rhythm: Regular rhythm  Tachycardia present  Pulses: Normal pulses  Heart sounds: Normal heart sounds and S2 normal  No murmur heard  Abdominal:      General: Bowel sounds are normal  There is no distension  Palpations: Abdomen is soft  Tenderness: There is no abdominal tenderness  Musculoskeletal:      Cervical back: Normal range of motion  Right lower leg: No edema  Left lower leg: No edema  Skin:     General: Skin is warm and moist       Capillary Refill: Capillary refill takes less than 2 seconds  Neurological:      General: No focal deficit present  Mental Status: He is alert and oriented to person, place, and time  Mental status is at baseline  Motor: No weakness  Psychiatric:         Mood and Affect: Mood is anxious  Speech: Speech normal          Behavior: Behavior normal  Behavior is cooperative           Judgment: Judgment normal           Additional Data:     Labs:  Results from last 7 days   Lab Units 12/29/21  0445   WBC Thousand/uL 25 34*   HEMOGLOBIN g/dL 12 4   HEMATOCRIT % 38 1   PLATELETS Thousands/uL 206   NEUTROS PCT % 86*   LYMPHS PCT % 2*   MONOS PCT % 11   EOS PCT % 0     Results from last 7 days   Lab Units 12/29/21  0445 12/28/21  1555 12/28/21  1555   SODIUM mmol/L 137   < > 132*   POTASSIUM mmol/L 3 5   < > 3 8   CHLORIDE mmol/L 102   < > 96   CO2 mmol/L 26   < > 28   BUN mg/dL 19   < > 17   CREATININE mg/dL 1 40*   < > 1 22*   ANION GAP mmol/L 9   < > 8   CALCIUM mg/dL 8 0*   < > 9 3   ALBUMIN g/dL  --   --  4 0   TOTAL BILIRUBIN mg/dL  --   --  0 72   ALK PHOS U/L  --   --  43 9   ALT U/L  --   --  17   AST U/L  --   -- 20   GLUCOSE RANDOM mg/dL 99   < > 102    < > = values in this interval not displayed  Results from last 7 days   Lab Units 12/28/21  1555   INR  1 14             Results from last 7 days   Lab Units 12/28/21  1555   LACTIC ACID mmol/L 2 0   PROCALCITONIN ng/ml 0 91*       Lines/Drains:  Invasive Devices  Report    Peripheral Intravenous Line            Peripheral IV 12/28/21 Right Antecubital <1 day    Peripheral IV 12/29/21 Left Antecubital <1 day          Drain            Urethral Catheter Double-lumen; Latex <1 day              Urinary Catheter:  Goal for removal: Voiding trial when ambulation improves               Imaging: Reviewed radiology reports from this admission including: chest xray and abdominal/pelvic CT    Recent Cultures (last 7 days):   Results from last 7 days   Lab Units 12/28/21  1555   GRAM STAIN RESULT  Gram negative rods*  Gram negative rods*       Last 24 Hours Medication List:   Current Facility-Administered Medications   Medication Dose Route Frequency Provider Last Rate    acetaminophen  650 mg Oral Q6H PRN Stephan Hawk MD      aluminum-magnesium hydroxide-simethicone  30 mL Oral Q6H PRN Stephan Hawk MD      cefepime  2,000 mg Intravenous Q8H Vel Vargas MD      [START ON 12/30/2021] docusate sodium  100 mg Oral Daily JANINE Hampton      enoxaparin  40 mg Subcutaneous Q24H [address] JANINE Hampton      HYDROmorphone  0 5 mg Intravenous Q6H PRN Stephan Hawk MD      lactated ringers  125 mL/hr Intravenous Continuous Stephan Hawk  mL/hr (12/29/21 2796)    methocarbamol  500 mg Oral Q6H PRN Stephan Hawk MD      oxyCODONE  5 mg Oral Q6H PRN Stephan Hawk MD      pantoprazole  40 mg Oral Early Morning Stephan Hawk MD      polyethylene glycol  17 g Oral Daily PRN JANINE Hampton      senna  1 tablet Oral HS JANINE Hampton          Today, Patient Was Seen By: JANINE Hampton    **Please Note: This note may have been constructed using a voice recognition system  **

## 2021-12-29 NOTE — CONSULTS
Consultation - Infectious Disease   Sandy Gorman 35 y o  male MRN: [id number]  Unit/Bed#: -01 Encounter: [id number]      IMPRESSION & RECOMMENDATIONS:   1  Sepsis, POA  Patient on admission noted to have high fever along with tachycardia  Blood cultures have now returned positive  Likely source is 2 and 3  Patient still remains febrile intermittently along with associated tachycardia after procedure  Antibiotics adjusted as below  Continue to trend fever curve/vitals  Repeat labs tomorrow  Repeat blood cultures collected today  Follow-up pending blood culture  Follow-up pending urine culture  Additional supportive care as per primary  Additional interventions pending clinical course    2  Gram-negative bacteremia  Two 2 blood cultures from admission have isolated Gram-negative rods  Likely sources problem 3  Prior culture data reviewed  Unfortunately patient continues to spike fever after procedure  Recently had antibiotics on admission in November  Concerned this may be either more drug resistant pathogen for possible polymicrobial infection  Will switch to cefepime 2 g every 8 hours, higher dosing given 6  Will discontinue ceftriaxone  Follow-up pending and repeat blood cultures  Follow-up pending urine culture  Continue to trend fever curve/vitals  Repeat labs tomorrow  Monitor for further urinary symptoms  Will adjust antibiotics further based on cultures  Hopeful transition shortly to oral antibiotics with plan for 7-10 days of therapy    3  Nephrolithiasis and mild hydronephrosis  I suspect that this contributed to patient's current presentation  He is now status post stent and Gil catheter placement today  Follow-up pending culture  Antibiotics as above  Continue to trend fever curve/WBC  Ongoing follow-up by Urology    4  Urethral stricture  Was diagnosed and underwent dilation in October  Under procedure today noted to only be mild    Unlikely to be contributing to the above currently  Patient will need close follow-up with Urology for this issue as outpatient  5  Acute kidney injury  Creatinine continues to rise likely due to issues above along with recurrent fevers  Cefepime dosing as above  Further dose adjust antibiotics as needed  Repeat chemistry tomorrow  Fluid hydration as per primary  Monitor urine output     6  Morbid obesity  Patient noted to have a BMI of 60  Prema Uribe higher antibiotic dosing as above  Recommend discussion of weight loss and diet changes as an outpatient given issues above  Above plan discussed with patient and with the primary service  ID consult service will continue to follow  HISTORY OF PRESENT ILLNESS:  Reason for Consult:  Gram-negative bacteremia    HPI: Stiven Larry is a 35y o  year old male with anxiety, GERD, urethral stricture and morbid obesity  Patient has had multiple admissions recently for urologic issues  In October he was admitted for acute urinary retention due to urethral stricture  In November the patient was admitted for UTI with left-sided pyelonephritis  On imaging in November the patient was noted to have left-sided renal calculi  He did not have procedures on that admission or he does not seem to have Gil catheter at discharge  The patient presents now with left-sided flank pain and abdominal pain  He reports that a day or so prior to admission he believes that he passed something in his urine and did not feel he had significant pain  It was then a day later that the pain symptoms began along with fevers and chills  In the emergency department patient was febrile and tachycardic and had leukocytosis  Imaging was done and CT of the abdomen showed left-sided renal colic with an 8 mm calculus in the proximal ureter resulting in mild hydronephrosis  Additionally there is a 5 mm nonobstructing stone/stone fragments in the left lower pole    He was noted to have urinary bladder wall thickening on the imaging as well  Chest x-ray was unremarkable  Patient was taken to the OR with Urology today  History sugar was noted to be mild and was opened again  He was confirmed to have left-sided hydronephrosis insert urinary stent was placed and cultures from the left renal pelvis were collected  Gil catheter was also placed  Since the procedure the patient has been spiking high fever  White blood cell count is higher today  Two 2 blood cultures have isolated Gram-negative rods  Urine cultures are pending  His other vitals are fortunately stable  Creatinine elevated from prior  Patient otherwise denies having any nausea, vomiting, chest pain or shortness of breath  He reports improvement in his left flank discomfort  He still feels a sense of urgency with urination despite having Gil catheter in place  He is overall frustrated by frequent hospitalizations  We were consulted this time for further assistance with management  REVIEW OF SYSTEMS:  A complete 12 point system-based review of systems is negative other than that noted in the HPI  PAST MEDICAL HISTORY:  Past Medical History:   Diagnosis Date    Anxiety     GERD (gastroesophageal reflux disease)     Hypertension     Urethral stricture      Past Surgical History:   Procedure Laterality Date    CHOLECYSTECTOMY      2004    CYSTOSCOPY N/A 10/19/2021    Procedure: CYSTOSCOPY;  Surgeon: Cris Ta MD;  [facility];  Service: Urology    MN CYSTOSCOPY,DIR VIS INT URETHROTOMY N/A 10/19/2021    Procedure: DIRECT VISUAL INTERAL URETHROTOMY (DVIU);   Surgeon: Cris Ta MD;  [facility];  Service: Urology       FAMILY HISTORY:  Non-contributory    SOCIAL HISTORY:  Social History   Social History     Substance and Sexual Activity   Alcohol Use Yes    Comment: rarely     Social History     Substance and Sexual Activity   Drug Use Never     Social History     Tobacco Use   Smoking Status Former Smoker    Types: Cigarettes   Smokeless Tobacco Never Used       ALLERGIES:  No Known Allergies    MEDICATIONS:  All current active medications have been reviewed  PHYSICAL EXAM:  Temp:  [97 7 °F (36 5 °C)-103 °F (39 4 °C)] 103 °F (39 4 °C)  HR:  [] 106  Resp:  [16-40] 25  BP: ()/(33-79) 131/62  SpO2:  [92 %-99 %] 92 %  Temp (24hrs), Av °F (37 8 °C), Min:97 7 °F (36 5 °C), Max:103 °F (39 4 °C)  Current: Temperature: (!) 103 °F (39 4 °C)    Intake/Output Summary (Last 24 hours) at 2021 1204  Last data filed at 2021 3723  Gross per 24 hour   Intake 3709 ml   Output 505 ml   Net 3204 ml       General Appearance:  Appearing well, nontoxic, and in no distress; obese gentleman  Head:  Normocephalic, without obvious abnormality, atraumatic   Eyes:  Conjunctiva pink and sclera anicteric, both eyes   Nose: Nares normal, mucosa normal, no drainage   Throat: Oropharynx moist without lesions   Neck: Supple, symmetrical, no adenopathy, no tenderness/mass/nodules   Back:   Unable to turn patient fully myself at this time to examine his back  Lungs:   Clear to auscultation bilaterally anteriorly, respirations unlabored on room air   Chest Wall:  No tenderness or deformity   Heart:  RRR; no murmur, rub or gallop appreciated   Abdomen:   Soft, non-tender, non-distended, positive bowel sounds; no suprapubic discomfort  Bloody urine noted in Gil catheter bag  Extremities: No cyanosis, clubbing or edema   Skin: No rashes or lesions  No draining wounds noted  Lymph nodes: Cervical, supraclavicular nodes normal   Neurologic: Alert and oriented times 3, really moving all of his extremities against gravity  Answering questions appropriately  LABS, IMAGING, & OTHER STUDIES:  Lab Results:  I have personally reviewed pertinent labs    Results from last 7 days   Lab Units 21  0445 21  1555   WBC Thousand/uL 25 34* 15 98*   HEMOGLOBIN g/dL 12 4 13 8   PLATELETS Thousands/uL 206 239     Results from last 7 days   Lab Units 12/29/21  0445 12/28/21  1555 12/28/21  1555   POTASSIUM mmol/L 3 5   < > 3 8   CHLORIDE mmol/L 102   < > 96   CO2 mmol/L 26   < > 28   BUN mg/dL 19   < > 17   CREATININE mg/dL 1 40*   < > 1 22*   EGFR ml/min/1 73sq m 61   < > 72   CALCIUM mg/dL 8 0*   < > 9 3   AST U/L  --   --  20   ALT U/L  --   --  17   ALK PHOS U/L  --   --  43 9    < > = values in this interval not displayed  Results from last 7 days   Lab Units 12/28/21  1555   GRAM STAIN RESULT  Gram negative rods*  Gram negative rods*       Imaging Studies:   I have personally reviewed pertinent imaging study reports and images in PACS  Other Studies:   I have personally reviewed pertinent reports

## 2021-12-30 LAB
ALBUMIN SERPL BCP-MCNC: 3.4 G/DL (ref 3.4–4.8)
ALP SERPL-CCNC: 43.7 U/L (ref 10–129)
ALT SERPL W P-5'-P-CCNC: 18 U/L (ref 5–63)
ANION GAP SERPL CALCULATED.3IONS-SCNC: 7 MMOL/L (ref 4–13)
AST SERPL W P-5'-P-CCNC: 17 U/L (ref 15–41)
BASOPHILS # BLD AUTO: 0.03 THOUSANDS/ΜL (ref 0–0.1)
BASOPHILS NFR BLD AUTO: 0 % (ref 0–1)
BILIRUB SERPL-MCNC: 0.36 MG/DL (ref 0.3–1.2)
BUN SERPL-MCNC: 18 MG/DL (ref 6–20)
CALCIUM ALBUM COR SERPL-MCNC: 9.1 MG/DL (ref 8.3–10.1)
CALCIUM SERPL-MCNC: 8.6 MG/DL (ref 8.4–10.2)
CHLORIDE SERPL-SCNC: 104 MMOL/L (ref 96–108)
CO2 SERPL-SCNC: 30 MMOL/L (ref 22–33)
CREAT SERPL-MCNC: 0.85 MG/DL (ref 0.5–1.2)
EOSINOPHIL # BLD AUTO: 0 THOUSAND/ΜL (ref 0–0.61)
EOSINOPHIL NFR BLD AUTO: 0 % (ref 0–6)
ERYTHROCYTE [DISTWIDTH] IN BLOOD BY AUTOMATED COUNT: 15.4 % (ref 11.6–15.1)
GFR SERPL CREATININE-BSD FRML MDRD: 106 ML/MIN/1.73SQ M
GLUCOSE SERPL-MCNC: 129 MG/DL (ref 65–140)
HCT VFR BLD AUTO: 38.5 % (ref 36.5–49.3)
HGB BLD-MCNC: 12.1 G/DL (ref 12–17)
IMM GRANULOCYTES # BLD AUTO: 0.07 THOUSAND/UL (ref 0–0.2)
IMM GRANULOCYTES NFR BLD AUTO: 0 % (ref 0–2)
LYMPHOCYTES # BLD AUTO: 0.82 THOUSANDS/ΜL (ref 0.6–4.47)
LYMPHOCYTES NFR BLD AUTO: 4 % (ref 14–44)
MCH RBC QN AUTO: 27.1 PG (ref 26.8–34.3)
MCHC RBC AUTO-ENTMCNC: 31.4 G/DL (ref 31.4–37.4)
MCV RBC AUTO: 86 FL (ref 82–98)
MONOCYTES # BLD AUTO: 1.96 THOUSAND/ΜL (ref 0.17–1.22)
MONOCYTES NFR BLD AUTO: 10 % (ref 4–12)
NEUTROPHILS # BLD AUTO: 15.92 THOUSANDS/ΜL (ref 1.85–7.62)
NEUTS SEG NFR BLD AUTO: 86 % (ref 43–75)
NRBC BLD AUTO-RTO: 0 /100 WBCS
PLATELET # BLD AUTO: 180 THOUSANDS/UL (ref 149–390)
PMV BLD AUTO: 10.5 FL (ref 8.9–12.7)
POTASSIUM SERPL-SCNC: 3.7 MMOL/L (ref 3.5–5)
PROT SERPL-MCNC: 7 G/DL (ref 6.4–8.3)
RBC # BLD AUTO: 4.46 MILLION/UL (ref 3.88–5.62)
SODIUM SERPL-SCNC: 141 MMOL/L (ref 133–145)
WBC # BLD AUTO: 18.8 THOUSAND/UL (ref 4.31–10.16)

## 2021-12-30 PROCEDURE — 85025 COMPLETE CBC W/AUTO DIFF WBC: CPT | Performed by: INTERNAL MEDICINE

## 2021-12-30 PROCEDURE — 99232 SBSQ HOSP IP/OBS MODERATE 35: CPT

## 2021-12-30 PROCEDURE — 99232 SBSQ HOSP IP/OBS MODERATE 35: CPT | Performed by: INTERNAL MEDICINE

## 2021-12-30 PROCEDURE — 80053 COMPREHEN METABOLIC PANEL: CPT | Performed by: INTERNAL MEDICINE

## 2021-12-30 RX ADMIN — CEFEPIME HYDROCHLORIDE 2000 MG: 2 INJECTION, SOLUTION INTRAVENOUS at 12:01

## 2021-12-30 RX ADMIN — CEFEPIME HYDROCHLORIDE 2000 MG: 2 INJECTION, SOLUTION INTRAVENOUS at 21:43

## 2021-12-30 RX ADMIN — ACETAMINOPHEN 650 MG: 325 TABLET, FILM COATED ORAL at 21:49

## 2021-12-30 RX ADMIN — CEFEPIME HYDROCHLORIDE 2000 MG: 2 INJECTION, SOLUTION INTRAVENOUS at 04:01

## 2021-12-30 RX ADMIN — DOCUSATE SODIUM 100 MG: 100 CAPSULE, LIQUID FILLED ORAL at 08:55

## 2021-12-30 RX ADMIN — ENOXAPARIN SODIUM 40 MG: 40 INJECTION SUBCUTANEOUS at 08:55

## 2021-12-30 RX ADMIN — PANTOPRAZOLE SODIUM 40 MG: 40 TABLET, DELAYED RELEASE ORAL at 06:00

## 2021-12-30 RX ADMIN — STANDARDIZED SENNA CONCENTRATE 8.6 MG: 8.6 TABLET ORAL at 21:43

## 2021-12-30 NOTE — ASSESSMENT & PLAN NOTE
· CT Abd: 8 mm stone left proximal ureter mild hydronephrosis  · S/p cysto with ureteral stent placement 12/29  · Continue Pain management  · Urology following, appreciate recommendations  · S/p cysto with left stent DVIU  Awaiting cultures  Will DC Gil catheter    · Will initiate urinary retention protocol  · Monitor I/O's  · Supportive care

## 2021-12-30 NOTE — PROGRESS NOTES
Progress Note - Urology      Patient: Avram Ormond   : 1980 Sex: male   MRN: [id number]     CSN: [id number]  Unit/Bed#: -01     SUBJECTIVE:   T-max 103° yesterday   status post cysto left stent      Objective   Vitals: /64 (BP Location: Left arm)   Pulse 85   Temp 98 2 °F (36 8 °C) (Tympanic)   Resp 18   Ht 5' 11" (1 803 m)   Wt (!) 196 kg (433 lb)   SpO2 93%   BMI 60 39 kg/m²     I/O last 24 hours:   In: 4041 [P O :1360; I V :2631; IV Piggyback:50]  Out: 5005 [Urine:5000; Blood:5]      Physical Exam:   General Alert awake   Normocephalic atraumatic PERRLA  Lungs clear bilaterally  Cardiac normal S1 normal S2  Abdomen soft, flank pain  Extremities no edema      Lab Results: CBC:   Lab Results   Component Value Date    WBC 18 80 (H) 2021    HGB 12 1 2021    HCT 38 5 2021    MCV 86 2021     2021    MCH 27 1 2021    MCHC 31 4 2021    RDW 15 4 (H) 2021    MPV 10 5 2021    NRBC 0 2021     CMP:   Lab Results   Component Value Date     2017     2021     2017    CO2 30 2021    CO2 27 2017    BUN 18 2021    BUN 14 2017    CREATININE 0 85 2021    CREATININE 0 75 2017    CALCIUM 8 6 2021    CALCIUM 9 4 2017    AST 17 2021    AST 18 2017    ALT 18 2021    ALT 18 2017    ALKPHOS 43 7 2021    ALKPHOS 46 2017    PROT 7 7 2017    BILITOT 0 5 2017    EGFR 106 2021     Urinalysis:   Lab Results   Component Value Date    COLORU Yellow 2021    COLORU YELLOW 2016    CLARITYU Clear 2021    SPECGRAV 1 025 2021    SPECGRAV 1 025 2016    PHUR 5 5 2021    PHUR 6 0 2016    LEUKOCYTESUR Trace (A) 2021    LEUKOCYTESUR NEGATIVE 2016    NITRITE Negative 2021    NITRITE NEGATIVE 2016    PROTEINUA NEGATIVE 2016    GLUCOSEU Negative 2021 KETONESU Negative 12/28/2021    KETONESU NEGATIVE 08/17/2016    BILIRUBINUR Negative 12/28/2021    BILIRUBINUR NEGATIVE 08/17/2016    BLOODU 3+ (A) 12/28/2021     Urine Culture:   Lab Results   Component Value Date    URINECX 10,000-19,000 cfu/ml  12/28/2021     PSA: No results found for: PSA      Assessment/ Plan:  Cysto left stent DVIU day 1    Urosepsis  Awaiting cultures  Will DC Gil catheter           Alvarez Kraft MD

## 2021-12-30 NOTE — ASSESSMENT & PLAN NOTE
· BP reviewed and is stable  · Continue to Hold Home medications:  · Lisinopril   · Patient with ADILSON and soft blood pressure upon arrival to ED, now improved  · Can restart lisinopril tomorrow if renal function continues to improve    · Continue to monitor BP

## 2021-12-30 NOTE — ASSESSMENT & PLAN NOTE
· Resolved  · Creatinine on admission 1 22-->1 40  Baseline 0 6-0 8  · Likely due to stone, and sepsis  · S/P IV resusciation in the ED  · Crt improving: Most recent 0 85  · Will discontinue IV resuscitation, patient with improved oral intake    · Hold Home Medications  · Lisinopril  · Avoid/Limit nephrotoxins  · Avoid hypotension fluctuations of blood pressure  · Continue monitor renal function

## 2021-12-30 NOTE — ASSESSMENT & PLAN NOTE
 SIRS criteria:  Fever, tachycardia, tachypnea, leukocytosis   End organ damage: ADILSON   Suspect secondary to Left ureteral stone   Lactic acid:  Normal   Procal: 0 91   Blood Cultures: +Gram negative Rods  Klebsiella Enterobacter   Repeat blood cultures (12/29): pending   Urine Cultures: Pending   IV Fluids:  S/P Sepsis fluid resuscitation in ED   Will discontinue IV resuscitation   S/P IV Rocephin for 2 days  Transition to IV cefepime per ID   S/P OR for Csyto with ureteral Stent placement on 12/29   Patient afebrile, with improving leukocytosis   Infectious disease following,  o Plan to transition to cefepime 2 G Q 8 hours  Will discontinue ceftriaxone  Follow up cultures  Hopefully transition to oral antibiotics for 7-10 days of therapy     Continue to monitor for worsening signs of infection   Follow-up cultures

## 2021-12-30 NOTE — UTILIZATION REVIEW
Inpatient Admission Authorization Request   NOTIFICATION OF INPATIENT ADMISSION/INPATIENT AUTHORIZATION REQUEST   SERVICING FACILITY:   [facility] [address]   [id number] [place] [address], [address] [place] [address]  Tax ID: [id number]  NPI: [id number]  Place of Service: Inpatient [address]  Place of Service Code: 24     ATTENDING PROVIDER:  Attending Name and NPI#: Lori Adrienne, Kristen Flores [[id number]]  Address: [address] [place] [address], [address] [place] [address]  Phone:  [phone]     UTILIZATION REVIEW CONTACT:  Kristina Covarrubias Utilization Review Supervisor  Network Utilization Review Department  Phone: [phone]  Fax: [phone]  Email: [email or web address]     PHYSICIAN ADVISORY SERVICES:  FOR [facility] REVIEW - MEDICAL NECESSITY DENIAL  Phone: [phone]  Fax: [phone]  Email: [email or web address]     TYPE OF REQUEST:  Inpatient Status     ADMISSION INFORMATION:  ADMISSION DATE/TIME: 12/29/21 12:18 PM  PATIENT DIAGNOSIS CODE/DESCRIPTION:  Ureterolithiasis [N20 1]  Back pain [M54 9]  Leukocytosis [D72 829]  UTI (urinary tract infection) [N39 0]  Abdominal pain [R10 9]  Fever [R50 9]  DISCHARGE DATE/TIME: No discharge date for patient encounter  DISCHARGE DISPOSITION (IF DISCHARGED): Home/Self Care     IMPORTANT INFORMATION:  Please contact the Kristina Covarrubias directly with any questions or concerns regarding this request  Department voicemails are confidential     Send requests for admission clinical reviews, concurrent reviews, approvals, and administrative denials due to lack of clinical to fax [phone]

## 2021-12-30 NOTE — PLAN OF CARE
Problem: GENITOURINARY - ADULT  Goal: Urinary catheter remains patent  Description: INTERVENTIONS:  - Assess patency of urinary catheter  - Follow guidelines for intermittent irrigation of non-functioning urinary catheter  Outcome: Progressing     Problem: INFECTION - ADULT  Goal: Absence or prevention of progression during hospitalization  Description: INTERVENTIONS:  - Assess and monitor for signs and symptoms of infection  - Monitor lab/diagnostic results  - Monitor all insertion sites, i e  indwelling lines, tubes, and drains  - Administer medications as ordered  - Instruct and encourage patient and family to use good hand hygiene technique  Outcome: Progressing     Problem: INFECTION - ADULT  Goal: Absence of fever/infection during neutropenic period  Description: INTERVENTIONS:  - Monitor WBC  - educate patient on antibiotics as ordered  Outcome: Progressing

## 2021-12-30 NOTE — PLAN OF CARE
Problem: GENITOURINARY - ADULT  Goal: Urinary catheter remains patent  Description: INTERVENTIONS:  - Assess patency of urinary catheter  - If patient has a chronic desouza, consider changing catheter if non-functioning  - Follow guidelines for intermittent irrigation of non-functioning urinary catheter  Outcome: Progressing

## 2021-12-30 NOTE — ASSESSMENT & PLAN NOTE
· Recurrent bulbar urethral stricture  · 10/2021 status post cystoscopy, dilation of stricture  · Gil catheter 10/20-11/19  · Has had no further urinary retention issues  · Will need continued outpatient follow-up with Urology upon discharge

## 2021-12-30 NOTE — PROGRESS NOTES
Kathya 43  Progress Note - Chula Glover 1980, 35 y o  male MRN: [id number]  Unit/Bed#: -01 Encounter: [id number]  Primary Care Provider: Luoie Mckeon DO   Date and time admitted to hospital: 12/28/2021  2:54 PM    * SIRS (systemic inflammatory response syndrome) ([facility])  Assessment & Plan   SIRS criteria:  Fever, tachycardia, tachypnea, leukocytosis   End organ damage: ADILSON   Suspect secondary to Left ureteral stone   Lactic acid:  Normal   Procal: 0 91   Blood Cultures: +Gram negative Rods  Klebsiella Enterobacter   Repeat blood cultures (12/29): pending   Urine Cultures: Pending   IV Fluids:  S/P Sepsis fluid resuscitation in ED   Will discontinue IV resuscitation   S/P IV Rocephin for 2 days  Transition to IV cefepime per ID   S/P OR for Csyto with ureteral Stent placement on 12/29   Patient afebrile, with improving leukocytosis   Infectious disease following,  o Plan to transition to cefepime 2 G Q 8 hours  Will discontinue ceftriaxone  Follow up cultures  Hopefully transition to oral antibiotics for 7-10 days of therapy   Continue to monitor for worsening signs of infection   Follow-up cultures    ADILSON (acute kidney injury) ([facility] [address] )  Assessment & Plan  · Resolved  · Creatinine on admission 1 22-->1 40  Baseline 0 6-0 8  · Likely due to stone, and sepsis  · S/P IV resusciation in the ED  · Crt improving: Most recent 0 85  · Will discontinue IV resuscitation, patient with improved oral intake  · Hold Home Medications  · Lisinopril  · Avoid/Limit nephrotoxins  · Avoid hypotension fluctuations of blood pressure  · Continue monitor renal function    Left ureteral stone  Assessment & Plan  · CT Abd: 8 mm stone left proximal ureter mild hydronephrosis  · S/p cysto with ureteral stent placement 12/29  · Continue Pain management  · Urology following, appreciate recommendations  · S/p cysto with left stent DVIU  Awaiting cultures    Will DC Gil catheter  · Will initiate urinary retention protocol  · Monitor I/O's  · Supportive care    Urethral stricture  Assessment & Plan  · Recurrent bulbar urethral stricture  · 10/2021 status post cystoscopy, dilation of stricture  · Gil catheter 10/20-11/19  · Has had no further urinary retention issues  · Will need continued outpatient follow-up with Urology upon discharge    Constipation  Assessment & Plan  · Moderate stool noted on imaging  · Continue bowel regimen      HTN (hypertension)  Assessment & Plan  · BP reviewed and is stable  · Continue to Hold Home medications:  · Lisinopril   · Patient with ADILSON and soft blood pressure upon arrival to ED, now improved  · Can restart lisinopril tomorrow if renal function continues to improve  · Continue to monitor BP      VTE Pharmacologic Prophylaxis: VTE Score: 6 High Risk (Score >/= 5) - Pharmacological DVT Prophylaxis Ordered: enoxaparin (Lovenox)  Sequential Compression Devices Ordered  Patient Centered Rounds: I performed bedside rounds with nursing staff today  Discussions with Specialists or Other Care Team Provider:  Case management    Education and Discussions with Family / Patient: Patient declined call to   Time Spent for Care: 30 minutes  More than 50% of total time spent on counseling and coordination of care as described above  Current Length of Stay: 1 day(s)  Current Patient Status: Inpatient   Certification Statement: The patient will continue to require additional inpatient hospital stay due to Sepsis requiring IV antibiotics and infectious disease consultation  Discharge Plan: Anticipate discharge in 24-48 hrs to home  Code Status: Level 1 - Full Code    Subjective:   Patient states he feels okay today  Patient irritable and does not understand with urologist yet come see him  Patient denies any active chest pain, worsening shortness of breath, abdominal pain, nausea, vomiting, or diarrhea      Objective:     Vitals: Temp (24hrs), Av 9 °F (37 2 °C), Min:97 9 °F (36 6 °C), Max:100 3 °F (37 9 °C)    Temp:  [97 9 °F (36 6 °C)-100 3 °F (37 9 °C)] 98 2 °F (36 8 °C)  HR:  [78-94] 85  Resp:  [18-20] 18  BP: (106-133)/(57-74) 122/64  SpO2:  [90 %-96 %] 93 %  Body mass index is 60 39 kg/m²  Input and Output Summary (last 24 hours): Intake/Output Summary (Last 24 hours) at 2021 1212  Last data filed at 2021 1201  Gross per 24 hour   Intake 2641 ml   Output 5400 ml   Net -2759 ml       Physical Exam:   Physical Exam  Vitals and nursing note reviewed  Constitutional:       General: He is not in acute distress  Appearance: Normal appearance  He is obese  He is not ill-appearing  HENT:      Head: Normocephalic  Nose: Nose normal  No congestion  Mouth/Throat:      Mouth: Mucous membranes are moist       Pharynx: Oropharynx is clear  Eyes:      Pupils: Pupils are equal, round, and reactive to light  Cardiovascular:      Rate and Rhythm: Normal rate and regular rhythm  Pulses: Normal pulses  Heart sounds: Normal heart sounds  No murmur heard  Pulmonary:      Effort: Pulmonary effort is normal  No respiratory distress  Breath sounds: Decreased breath sounds present  Abdominal:      General: Abdomen is flat  Bowel sounds are normal  There is no distension  Palpations: Abdomen is soft  Tenderness: There is no abdominal tenderness  Musculoskeletal:         General: No swelling  Normal range of motion  Cervical back: Normal range of motion  No tenderness  Right lower leg: No edema  Left lower leg: No edema  Skin:     General: Skin is warm and dry  Capillary Refill: Capillary refill takes less than 2 seconds  Neurological:      General: No focal deficit present  Mental Status: He is alert and oriented to person, place, and time  Mental status is at baseline     Psychiatric:         Attention and Perception: Attention normal          Mood and Affect: Mood normal          Speech: Speech normal          Behavior: Behavior normal  Behavior is cooperative  Thought Content: Thought content normal          Judgment: Judgment normal           Additional Data:     Labs:  Results from last 7 days   Lab Units 12/30/21  0429   WBC Thousand/uL 18 80*   HEMOGLOBIN g/dL 12 1   HEMATOCRIT % 38 5   PLATELETS Thousands/uL 180   NEUTROS PCT % 86*   LYMPHS PCT % 4*   MONOS PCT % 10   EOS PCT % 0     Results from last 7 days   Lab Units 12/30/21  0429   SODIUM mmol/L 141   POTASSIUM mmol/L 3 7   CHLORIDE mmol/L 104   CO2 mmol/L 30   BUN mg/dL 18   CREATININE mg/dL 0 85   ANION GAP mmol/L 7   CALCIUM mg/dL 8 6   ALBUMIN g/dL 3 4   TOTAL BILIRUBIN mg/dL 0 36   ALK PHOS U/L 43 7   ALT U/L 18   AST U/L 17   GLUCOSE RANDOM mg/dL 129     Results from last 7 days   Lab Units 12/28/21  1555   INR  1 14             Results from last 7 days   Lab Units 12/29/21  0653 12/28/21  1555   LACTIC ACID mmol/L  --  2 0   PROCALCITONIN ng/ml 5 90* 0 91*       Lines/Drains:  Invasive Devices  Report    Peripheral Intravenous Line            Peripheral IV 12/29/21 Left Antecubital 1 day              Urinary Catheter:  Goal for removal: Voiding trial when ambulation improves               Imaging: No pertinent imaging reviewed  Recent Cultures (last 7 days):   Results from last 7 days   Lab Units 12/29/21  1328 12/28/21  1722 12/28/21  1555   BLOOD CULTURE  Received in Microbiology Lab  Culture in Progress  Received in Microbiology Lab  Culture in Progress    --  Klebsiella-Enterobacter  group*  Klebsiella-Enterobacter  group*   GRAM STAIN RESULT   --   --  Gram negative rods*  Gram negative rods*   URINE CULTURE   --  10,000-19,000 cfu/ml   --        Last 24 Hours Medication List:   Current Facility-Administered Medications   Medication Dose Route Frequency Provider Last Rate    acetaminophen  650 mg Oral Q6H PRN Angelica Hernadez MD      aluminum-magnesium hydroxide-simethicone 30 mL Oral Q6H PRN Hunter Sharp MD      cefepime  2,000 mg Intravenous Q8H Dannielle Michael MD 2,000 mg (12/30/21 1201)    docusate sodium  100 mg Oral Daily JANINE Galarza      enoxaparin  40 mg Subcutaneous Q24H [facility] JANINE Galarza      HYDROmorphone  0 5 mg Intravenous Q6H PRN Hunter Sharp MD      methocarbamol  500 mg Oral Q6H PRN Hunter Sharp MD      oxyCODONE  5 mg Oral Q6H PRN Hunter Sharp MD      pantoprazole  40 mg Oral Early Morning Hunter Sharp MD      polyethylene glycol  17 g Oral Daily PRN JANINE Galarza      senna  1 tablet Oral HS Katerina Haro, JANINE          Today, Patient Was Seen By: JANINE Galarza    **Please Note: This note may have been constructed using a voice recognition system  **

## 2021-12-30 NOTE — PROGRESS NOTES
Progress Note - Infectious Disease   Talat Pan 35 y o  male MRN: [id number]  Unit/Bed#: -01 Encounter: [id number]      Impression/Plan:  1  Sepsis, POA  Patient on admission noted to have high fever along with tachycardia  Blood cultures have now returned positive  Likely source is 2 and 3  Fever curve vitals now improving  Antibiotics as below  Continue to trend fever curve/vitals  Repeat labs tomorrow  Follow-up pending blood culture  Follow-up pending urine culture  Additional supportive care as per primary  Additional interventions pending clinical course     2  Gram-negative bacteremia  Two 2 blood cultures from admission have isolated Gram-negative rods  Likely source problem 3  Prior culture data reviewed  Recently had antibiotics on November admission  Fevers improving along with white count  Continue Cefepime 2 g every 8 hours, higher dosing given 6  Follow-up pending and repeat blood cultures  Follow-up pending urine culture  Continue to trend fever curve/vitals  Repeat labs tomorrow  Monitor for further urinary symptoms  Will adjust antibiotics further based on cultures  Hopeful transition shortly to oral antibiotics with plan for 7-10 days of therapy     3  Nephrolithiasis and mild hydronephrosis  I suspect that this contributed to patient's current presentation  He is now status post stent on 12/29 and Gil catheter removed today  Follow-up pending culture  Antibiotics as above  Continue to trend fever curve/WBC  Ongoing follow-up by Urology     4  Urethral stricture  Was diagnosed and underwent dilation in October 12/29 procedure noted to only be mild stricture  Unlikely to be contributing to the above currently  Patient will need close follow-up with Urology for this issue as outpatient      5  Acute kidney injury  Significantly improved    Cefepime dosing as above  Further dose adjust antibiotics as needed  Repeat chemistry tomorrow  Fluid hydration as per primary  Monitor urine output     6  Morbid obesity  Patient noted to have a BMI of 60  Krystina Boykin higher antibiotic dosing as above  Recommend discussion of weight loss and diet changes as an outpatient given issues above  Above plan discussed with patient and with primary service  ID consult service will continue to follow  Antibiotics:  Cefepime 2  Total antibiotic 3    Subjective:  Patient currently denies having any nausea, vomiting, chest pain or shortness of breath  Tolerating antibiotic without itching or rash  Denies significant back pain  Frustrated about remaining in the hospital   Still reporting having bloody urine and some discomfort the urine passes  Objective:  Vitals:  Temp:  [97 9 °F (36 6 °C)-99 °F (37 2 °C)] 99 °F (37 2 °C)  HR:  [78-98] 98  Resp:  [18-20] 20  BP: (122-133)/(57-90) 131/90  SpO2:  [92 %-96 %] 92 %  Temp (24hrs), Av 4 °F (36 9 °C), Min:97 9 °F (36 6 °C), Max:99 °F (37 2 °C)  Current: Temperature: 99 °F (37 2 °C)    Physical Exam:   General Appearance:  Alert, interactive, nontoxic, no acute distress  Chronically ill-appearing and obese  Throat: Oropharynx moist without lesions  Lungs:   Clear to auscultation bilaterally; no wheezes, rhonchi or rales; respirations unlabored on room air   Heart:  RRR; no murmur, rub or gallop appreciated   Abdomen:   Soft, non-tender, non-distended, positive bowel sounds  No CVA tenderness noted    Extremities: No clubbing, cyanosis or edema   Skin: No new rashes or lesions  No new draining wounds noted         Labs, Imaging, & Other studies:   All pertinent labs and imaging studies were personally reviewed  Results from last 7 days   Lab Units 21  1555   WBC Thousand/uL 18 80* 25 34* 15 98*   HEMOGLOBIN g/dL 12 1 12 4 13 8   PLATELETS Thousands/uL 180 206 239     Results from last 7 days   Lab Units 21  1555   POTASSIUM mmol/L 3 7   < > 3 8   CHLORIDE mmol/L 104   < > 96   CO2 mmol/L 30   < > 28   BUN mg/dL 18   < > 17   CREATININE mg/dL 0 85   < > 1 22*   EGFR ml/min/1 73sq m 106   < > 72   CALCIUM mg/dL 8 6   < > 9 3   AST U/L 17  --  20   ALT U/L 18  --  17   ALK PHOS U/L 43 7  --  43 9    < > = values in this interval not displayed  Results from last 7 days   Lab Units 12/29/21  1328 12/29/21  0932 12/28/21  1722 12/28/21  1555   BLOOD CULTURE  Received in Microbiology Lab  Culture in Progress  Received in Microbiology Lab  Culture in Progress    --   --  Klebsiella-Enterobacter  group*  Klebsiella-Enterobacter  group*   GRAM STAIN RESULT   --   --   --  Gram negative rods*  Gram negative rods*   URINE CULTURE   --  50,000-59,000 cfu/ml Gram Negative Bijan* 10,000-19,000 cfu/ml   --

## 2021-12-31 VITALS
SYSTOLIC BLOOD PRESSURE: 131 MMHG | BODY MASS INDEX: 44.1 KG/M2 | TEMPERATURE: 99.9 F | OXYGEN SATURATION: 94 % | HEART RATE: 87 BPM | DIASTOLIC BLOOD PRESSURE: 72 MMHG | RESPIRATION RATE: 19 BRPM | HEIGHT: 71 IN | WEIGHT: 315 LBS

## 2021-12-31 LAB
ANION GAP SERPL CALCULATED.3IONS-SCNC: 7 MMOL/L (ref 4–13)
BACTERIA BLD CULT: ABNORMAL
BACTERIA BLD CULT: ABNORMAL
BACTERIA UR CULT: ABNORMAL
BASOPHILS # BLD AUTO: 0.05 THOUSANDS/ΜL (ref 0–0.1)
BASOPHILS NFR BLD AUTO: 0 % (ref 0–1)
BUN SERPL-MCNC: 19 MG/DL (ref 6–20)
CALCIUM SERPL-MCNC: 9 MG/DL (ref 8.4–10.2)
CHLORIDE SERPL-SCNC: 103 MMOL/L (ref 96–108)
CO2 SERPL-SCNC: 30 MMOL/L (ref 22–33)
CREAT SERPL-MCNC: 0.81 MG/DL (ref 0.5–1.2)
EOSINOPHIL # BLD AUTO: 0.17 THOUSAND/ΜL (ref 0–0.61)
EOSINOPHIL NFR BLD AUTO: 1 % (ref 0–6)
ERYTHROCYTE [DISTWIDTH] IN BLOOD BY AUTOMATED COUNT: 15.4 % (ref 11.6–15.1)
GFR SERPL CREATININE-BSD FRML MDRD: 108 ML/MIN/1.73SQ M
GLUCOSE SERPL-MCNC: 87 MG/DL (ref 65–140)
GRAM STN SPEC: ABNORMAL
GRAM STN SPEC: ABNORMAL
HCT VFR BLD AUTO: 36.4 % (ref 36.5–49.3)
HGB BLD-MCNC: 11.6 G/DL (ref 12–17)
IMM GRANULOCYTES # BLD AUTO: 0.04 THOUSAND/UL (ref 0–0.2)
IMM GRANULOCYTES NFR BLD AUTO: 0 % (ref 0–2)
LYMPHOCYTES # BLD AUTO: 1.26 THOUSANDS/ΜL (ref 0.6–4.47)
LYMPHOCYTES NFR BLD AUTO: 10 % (ref 14–44)
MCH RBC QN AUTO: 27 PG (ref 26.8–34.3)
MCHC RBC AUTO-ENTMCNC: 31.9 G/DL (ref 31.4–37.4)
MCV RBC AUTO: 85 FL (ref 82–98)
MONOCYTES # BLD AUTO: 1.29 THOUSAND/ΜL (ref 0.17–1.22)
MONOCYTES NFR BLD AUTO: 11 % (ref 4–12)
NEUTROPHILS # BLD AUTO: 9.29 THOUSANDS/ΜL (ref 1.85–7.62)
NEUTS SEG NFR BLD AUTO: 78 % (ref 43–75)
NRBC BLD AUTO-RTO: 0 /100 WBCS
PLATELET # BLD AUTO: 197 THOUSANDS/UL (ref 149–390)
PMV BLD AUTO: 10.1 FL (ref 8.9–12.7)
POTASSIUM SERPL-SCNC: 3.7 MMOL/L (ref 3.5–5)
RBC # BLD AUTO: 4.29 MILLION/UL (ref 3.88–5.62)
SODIUM SERPL-SCNC: 140 MMOL/L (ref 133–145)
WBC # BLD AUTO: 12.1 THOUSAND/UL (ref 4.31–10.16)

## 2021-12-31 PROCEDURE — 85025 COMPLETE CBC W/AUTO DIFF WBC: CPT

## 2021-12-31 PROCEDURE — 80048 BASIC METABOLIC PNL TOTAL CA: CPT

## 2021-12-31 PROCEDURE — 99239 HOSP IP/OBS DSCHRG MGMT >30: CPT

## 2021-12-31 PROCEDURE — 99232 SBSQ HOSP IP/OBS MODERATE 35: CPT | Performed by: INTERNAL MEDICINE

## 2021-12-31 RX ORDER — ACETAMINOPHEN 325 MG/1
650 TABLET ORAL EVERY 6 HOURS PRN
Refills: 0
Start: 2021-12-31

## 2021-12-31 RX ORDER — SULFAMETHOXAZOLE AND TRIMETHOPRIM 800; 160 MG/1; MG/1
1 TABLET ORAL EVERY 12 HOURS SCHEDULED
Qty: 14 TABLET | Refills: 0 | Status: SHIPPED | OUTPATIENT
Start: 2021-12-31 | End: 2022-01-07

## 2021-12-31 RX ORDER — SULFAMETHOXAZOLE AND TRIMETHOPRIM 800; 160 MG/1; MG/1
1 TABLET ORAL EVERY 12 HOURS SCHEDULED
Status: DISCONTINUED | OUTPATIENT
Start: 2021-12-31 | End: 2021-12-31 | Stop reason: HOSPADM

## 2021-12-31 RX ORDER — LISINOPRIL 20 MG/1
40 TABLET ORAL DAILY
Status: DISCONTINUED | OUTPATIENT
Start: 2022-01-01 | End: 2021-12-31 | Stop reason: HOSPADM

## 2021-12-31 RX ORDER — DOCUSATE SODIUM 100 MG/1
100 CAPSULE, LIQUID FILLED ORAL DAILY
Refills: 0
Start: 2022-01-01

## 2021-12-31 RX ADMIN — CEFEPIME HYDROCHLORIDE 2000 MG: 2 INJECTION, SOLUTION INTRAVENOUS at 05:14

## 2021-12-31 RX ADMIN — CEFEPIME HYDROCHLORIDE 2000 MG: 2 INJECTION, SOLUTION INTRAVENOUS at 13:35

## 2021-12-31 RX ADMIN — DOCUSATE SODIUM 100 MG: 100 CAPSULE, LIQUID FILLED ORAL at 09:14

## 2021-12-31 RX ADMIN — POLYETHYLENE GLYCOL 3350 17 G: 17 POWDER, FOR SOLUTION ORAL at 13:35

## 2021-12-31 RX ADMIN — PANTOPRAZOLE SODIUM 40 MG: 40 TABLET, DELAYED RELEASE ORAL at 05:14

## 2021-12-31 RX ADMIN — ENOXAPARIN SODIUM 40 MG: 40 INJECTION SUBCUTANEOUS at 09:14

## 2021-12-31 NOTE — DISCHARGE INSTRUCTIONS
Acute Kidney Injury   WHAT YOU NEED TO KNOW:   Acute kidney injury (ADILSON) is also called acute kidney failure, or acute renal failure  ADILSON happens when your kidneys suddenly stop working correctly  Normally, the kidneys remove fluid, chemicals, and waste from your blood  These wastes are turned into urine by your kidneys  ADILSON usually happens over hours or days  When you have ADILSON, your kidneys do not remove the waste, chemicals, or extra fluid from your body  A normal amount of urine is not produced  ADILSON is usually temporary, it can take days to months to recover  ADILSON can also become a chronic kidney condition  DISCHARGE INSTRUCTIONS:   Call 911 if:   · You have sudden chest pain or trouble breathing  Seek care immediately if:   · Your symptoms get worse  Contact your healthcare provider if:   · Your symptoms return  · Your blood sugar or blood pressure level is not within the range your healthcare provider recommends  · You have questions or concerns about your condition or care  Nutrition:  Your healthcare provider may tell you to eat food low in sodium (salt), potassium, phosphorus, or protein  A dietitian can help you plan your meals  Drink liquids as directed: Your healthcare provider may recommend that you drink a certain amount of liquids  This will help your kidneys work better and decrease your risk for dehydration  Ask how much liquid to drink each day and which liquids are best for you  Prevent acute kidney injury:   · Manage other health conditions  such as diabetes, high blood pressure, or heart disease  These conditions increase your risk for acute kidney injury  Take your medicines for these conditions as directed  Also, monitor your blood sugar and blood pressure levels as directed  Contact your healthcare provider if your levels are not in the range he or she says it should be  · Talk to your healthcare provider before you take over-the-counter-medicine    NSAIDs, stomach medicine, or laxatives may harm your kidneys and increase your risk for acute kidney injury  If it is okay to take the medicine, follow the directions on the package  Do not take more than directed  · Tell healthcare providers you have had acute kidney injury  before you get contrast liquid for an x-ray or CT scan  Your healthcare provider may give you medicine to prevent kidney problems caused by the liquid  Follow up with your healthcare provider as directed: You will need to return for more tests to make sure your kidneys are working properly  You may also be referred to a kidney specialist  Write down your questions so you remember to ask them during your visits  © Copyright [employer] 2021 Information is for End User's use only and may not be sold, redistributed or otherwise used for commercial purposes  All illustrations and images included in CareNotes® are the copyrighted property of A D A M [employer] Inc  or Delfino Andrade   The above information is an  only  It is not intended as medical advice for individual conditions or treatments  Talk to your doctor, nurse or pharmacist before following any medical regimen to see if it is safe and effective for you  Urinary Tract Infection in Men   WHAT YOU NEED TO KNOW:   A urinary tract infection (UTI) is caused by bacteria that get inside your urinary tract  Most bacteria that enter your urinary tract come out when you urinate  If the bacteria stay in your urinary tract, you may get an infection  Your urinary tract includes your kidneys, ureters, bladder, and urethra  Urine is made in your kidneys, and it flows from the ureters to the bladder  Urine leaves the bladder through the urethra  A UTI is more common in your lower urinary tract, which includes your bladder and urethra  DISCHARGE INSTRUCTIONS:   Seek care immediately if:   · You are urinating very little or not at all  · You have a high fever with shaking chills  · You have side or back pain that gets worse  Contact your healthcare provider if:   · You have a mild fever  · You do not feel better after 2 days of taking antibiotics  · You are vomiting  · You have new symptoms, such as blood or pus in your urine  · You have questions or concerns about your condition or care  Medicines:   · Antibiotics  help fight a bacterial infection  · Medicines  may be given to decrease pain and burning when you urinate  They will also help decrease the feeling that you need to urinate often  These medicines will make your urine orange or red  · Take your medicine as directed  Contact your healthcare provider if you think your medicine is not helping or if you have side effects  Tell him or her if you are allergic to any medicine  Keep a list of the medicines, vitamins, and herbs you take  Include the amounts, and when and why you take them  Bring the list or the pill bottles to follow-up visits  Carry your medicine list with you in case of an emergency  Prevent another UTI:   · Empty your bladder often  Urinate and empty your bladder as soon as you feel the need  Do not hold your urine for long periods of time  · Drink liquids as directed  Ask how much liquid to drink each day and which liquids are best for you  You may need to drink more liquids than usual to help flush out the bacteria  Do not drink alcohol, caffeine, or citrus juices  These can irritate your bladder and increase your symptoms  Your healthcare provider may recommend cranberry juice to help prevent a UTI  · Urinate after you have sex  This can help flush out bacteria passed during sex  · Do pelvic muscle exercises often  Pelvic muscle exercises may help you start and stop urinating  Strong pelvic muscles may help you empty your bladder easier  Squeeze these muscles tightly for 5 seconds like you are trying to hold back urine  Then relax for 5 seconds   Gradually work up to squeezing for 10 seconds  Do 3 sets of 15 repetitions a day, or as directed  Follow up with your doctor as directed:  Write down your questions so you remember to ask them during your visits  © Copyright [employer] 2021 Information is for End User's use only and may not be sold, redistributed or otherwise used for commercial purposes  All illustrations and images included in CareNotes® are the copyrighted property of A D A M [employer] Inc  or [employer]elf[employer] Wood  The above information is an  only  It is not intended as medical advice for individual conditions or treatments  Talk to your doctor, nurse or pharmacist before following any medical regimen to see if it is safe and effective for you

## 2021-12-31 NOTE — ASSESSMENT & PLAN NOTE
 SIRS criteria:  Fever, tachycardia, tachypnea, leukocytosis   End organ damage: ADILSON   Suspect secondary to Left ureteral stone   Lactic acid:  Normal   Procal: 0 91   Blood Cultures: +Gram negative Rods  Klebsiella pneumoniae   Repeat blood cultures (12/29):  Preliminary results, no growth at 24 hours   Urine Cultures:  Positive for Klebsiella pneumoniae   IV Fluids:  S/P Sepsis fluid resuscitation   S/P IV Rocephin for 2 days  Transition to IV cefepime per ID   S/P OR for Csyto with ureteral Stent placement on 12/29   Patient remains afebrile, with improving leukocytosis   Infectious disease following,  o Continue IV cefepime 2 G Q 8 hours  Follow up on cultures  Hopefully transition to oral antibiotics for 7-10 days of therapy   Continue to monitor for worsening signs of infection   Will transition to p o  Bactrim for a total of 10 days following last dose of cefepime at 1400 today   Will discuss plan with Infectious Disease   Likely discharge this afternoon following IV antibiotic dosing

## 2021-12-31 NOTE — PLAN OF CARE
Problem: MOBILITY - ADULT  Goal: Maintain or return to baseline ADL function  Description: INTERVENTIONS:  -  Assess patient's ability to carry out ADLs; assess patient's baseline for ADL function and identify physical deficits which impact ability to perform ADLs (bathing, care of mouth/teeth, toileting, grooming, dressing, etc )  - Assess/evaluate cause of self-care deficits   - Assess range of motion  - Assess patient's mobility; develop plan if impaired  - Assess patient's need for assistive devices and provide as appropriate  - Encourage maximum independence but intervene and supervise when necessary  - Involve family in performance of ADLs  - Assess for home care needs following discharge   - Consider OT consult to assist with ADL evaluation and planning for discharge  - Provide patient education as appropriate  Outcome: Progressing  Goal: Maintains/Returns to pre admission functional level  Description: INTERVENTIONS:  - Perform BMAT or MOVE assessment daily    - Set and communicate daily mobility goal to care team and patient/family/caregiver     - Collaborate with rehabilitation services on mobility goals if consulted    - Out of bed for toileting  - Record patient progress and toleration of activity level   Outcome: Progressing     Problem: Prexisting or High Potential for Compromised Skin Integrity  Goal: Skin integrity is maintained or improved  Description: INTERVENTIONS:  - Identify patients at risk for skin breakdown  - Assess and monitor skin integrity  - Assess and monitor nutrition and hydration status  - Monitor labs   - Assess for incontinence   - Turn and reposition patient  - Assist with mobility/ambulation  - Relieve pressure over bony prominences  - Avoid friction and shearing  - Provide appropriate hygiene as needed including keeping skin clean and dry  - Evaluate need for skin moisturizer/barrier cream  - Collaborate with interdisciplinary team   - Patient/family teaching  - Consider wound care consult   Outcome: Progressing     Problem: GENITOURINARY - ADULT  Goal: Maintains or returns to baseline urinary function  Description: INTERVENTIONS:  - Assess urinary function  - Encourage oral fluids to ensure adequate hydration if ordered  - Administer IV fluids as ordered to ensure adequate hydration  - Administer ordered medications as needed  - Offer frequent toileting  - Follow urinary retention protocol if ordered  Outcome: Progressing  Goal: Absence of urinary retention  Description: INTERVENTIONS:  - Assess patients ability to void and empty bladder  - Monitor I/O  - Bladder scan as needed  - Discuss with physician/AP medications to alleviate retention as needed  - Discuss catheterization for long term situations as appropriate  Outcome: Progressing     Problem: INFECTION - ADULT  Goal: Absence or prevention of progression during hospitalization  Description: INTERVENTIONS:  - Assess and monitor for signs and symptoms of infection  - Monitor lab/diagnostic results  - Monitor all insertion sites, i e  indwelling lines, tubes, and drains  - Monitor endotracheal if appropriate and nasal secretions for changes in amount and color  - [place] appropriate cooling/warming therapies per order  - Administer medications as ordered  - Instruct and encourage patient and family to use good hand hygiene technique  - Identify and instruct in appropriate isolation precautions for identified infection/condition  Outcome: Progressing  Goal: Absence of fever/infection during neutropenic period  Description: INTERVENTIONS:  - Monitor WBC    Outcome: Progressing

## 2021-12-31 NOTE — PROGRESS NOTES
Progress Note - Infectious Disease   Kelsey Justin 35 y o  male MRN: [id number]  Unit/Bed#: -01 Encounter: [id number]      Impression/Plan:  1  Sepsis, POA   Patient on admission noted to have high fever along with tachycardia  Appears to be secondary to Klebsiella bacteremia in the setting of obstructive pyelonephritis  Fever has now resolved and the white cell count has normalized  Discontinue cefepime after next dose  Then changed to Bactrim DS tab p o  Q 12 hours through 1/6/2022 to complete 10 days total treatment  Supportive care     2  Klebsiella bacteremia   Suspect all related to obstructive pyelonephritis in the setting of nephrolithiasis  Clinically improved with decreased temperature curve and WBC count  Antibiotics as above  Monitor urinary symptoms  No additional ID workup for now    3  Obstructive pyelonephritis  Klebsiella  In the setting of nephrolithiasis with hydronephrosis  Coni Smith is now status post stent on 12/29 and Gil catheter  Antibiotics as above  Continue to trend fever curve/WBC  Ongoing follow-up by Urology     4  Urethral stricture   Was diagnosed and underwent dilation in October 12/29 procedure noted to only be mild stricture   Unlikely to be contributing to the above currently   Patient will need close follow-up with Urology for this issue as outpatient      5  Acute kidney injury   Resolved     6  Morbid obesity   Patient noted to have a BMI of 60  Gertrude Odonnell higher antibiotic dosing as above   Recommend discussion of weight loss and diet changes as an outpatient given issues above  Discussed the above management plan with the primary service    Antibiotics:  Cefepime 3  Total antibiotics 4  Subjective:  Patient has no fever, chills, sweats; no nausea, vomiting, diarrhea; no cough, shortness of breath; no pain  No new symptoms      Objective:  Vitals:  Temp:  [98 °F (36 7 °C)-99 9 °F (37 7 °C)] 99 9 °F (37 7 °C)  HR:  [87-98] 87  Resp:  [19-26] 19  BP: (131-141)/(72-90) 131/72  SpO2:  [92 %-95 %] 94 %  Temp (24hrs), Av °F (37 2 °C), Min:98 °F (36 7 °C), Max:99 9 °F (37 7 °C)  Current: Temperature: 99 9 °F (37 7 °C)    Physical Exam:   General Appearance:  Alert, interactive, nontoxic, no acute distress  Throat: Oropharynx moist without lesions  Lungs:   Clear to auscultation bilaterally; no wheezes, rhonchi or rales; respirations unlabored   Heart:  RRR; no murmur, rub or gallop   Abdomen:   Soft, non-tender, non-distended, positive bowel sounds  Extremities: No clubbing, cyanosis or edema   Skin: No new rashes or lesions  No draining wounds noted  Labs, Imaging, & Other studies:   All pertinent labs and imaging studies were personally reviewed  Results from last 7 days   Lab Units 21  0530 21  0429 21  0445   WBC Thousand/uL 12 10* 18 80* 25 34*   HEMOGLOBIN g/dL 11 6* 12 1 12 4   PLATELETS Thousands/uL 197 180 206     Results from last 7 days   Lab Units 21  0530 21  0429 21  0429 21  0445 21  0445 21  1555 21  1555   SODIUM mmol/L 140  --  141  --  137   < > 132*   POTASSIUM mmol/L 3 7   < > 3 7   < > 3 5   < > 3 8   CHLORIDE mmol/L 103   < > 104   < > 102   < > 96   CO2 mmol/L 30   < > 30   < > 26   < > 28   BUN mg/dL 19   < > 18   < > 19   < > 17   CREATININE mg/dL 0 81   < > 0 85   < > 1 40*   < > 1 22*   EGFR ml/min/1 73sq m 108   < > 106   < > 61   < > 72   CALCIUM mg/dL 9 0   < > 8 6   < > 8 0*   < > 9 3   AST U/L  --   --  17  --   --   --  20   ALT U/L  --   --  18  --   --    < > 17   ALK PHOS U/L  --   --  43 7  --   --    < > 43 9    < > = values in this interval not displayed  Results from last 7 days   Lab Units 21  1328 21  0932 21  1722 21  1555   BLOOD CULTURE  No Growth at 24 hrs    No Growth at 24 hrs   --   --  Klebsiella pneumoniae*  Klebsiella pneumoniae*   GRAM STAIN RESULT   --   --   --  Gram negative rods*  Gram negative rods* URINE CULTURE   --  50,000-59,000 cfu/ml Gram Negative Bijan* 10,000-19,000 cfu/ml   --      Results from last 7 days   Lab Units 12/29/21  0653 12/28/21  1555   PROCALCITONIN ng/ml 5 90* 0 91*

## 2021-12-31 NOTE — DISCHARGE SUMMARY
Sonya U  64   Discharge- Nargis Mataer 1980, 35 y o  male MRN: [id number]  Unit/Bed#: -01 Encounter: [id number]  Primary Care Provider: Lm Jaramillo DO   Date and time admitted to hospital: 12/28/2021  2:54 PM    * SIRS (systemic inflammatory response syndrome) (Formerly [facility])  Assessment & Plan   SIRS criteria:  Fever, tachycardia, tachypnea, leukocytosis   End organ damage: ADILSON   Suspect secondary to Left ureteral stone   Lactic acid:  Normal   Procal: 0 91   Blood Cultures: +Gram negative Rods  Klebsiella pneumoniae   Repeat blood cultures (12/29):  Preliminary results, no growth at 24 hours   Urine Cultures:  Positive for Klebsiella pneumoniae   IV Fluids:  S/P Sepsis fluid resuscitation   S/P IV Rocephin for 2 days  Transition to IV cefepime per ID   S/P OR for Csyto with ureteral Stent placement on 12/29   Patient remains afebrile, with improving leukocytosis   Infectious disease following,  o Continue IV cefepime 2 G Q 8 hours  Follow up on cultures  Hopefully transition to oral antibiotics for 7-10 days of therapy   Continue to monitor for worsening signs of infection   Will transition to p o  Bactrim for a total of 10 days following last dose of cefepime at 1400 today   Will discuss plan with Infectious Disease   Likely discharge this afternoon following IV antibiotic dosing  Left ureteral stone  Assessment & Plan  · CT Abd: 8 mm stone left proximal ureter mild hydronephrosis  · S/p cysto with ureteral stent placement 12/29  · Urology following, appreciate recommendations  · S/p cysto with left stent DVIU  Awaiting cultures  Will DC Gil catheter    · Patient with no difficulties voiding  · Will need outpatient follow-up with Urology    Urethral stricture  Assessment & Plan  · Recurrent bulbar urethral stricture  · 10/2021 status post cystoscopy, dilation of stricture  · Gil catheter 10/20-11/19  · Has had no further urinary retention issues  · Will need continued outpatient follow-up with Urology upon discharge    Constipation  Assessment & Plan  · Moderate stool noted on imaging  · Continue bowel regimen  · Last BM today 12/31    ADILSON (acute kidney injury) ([address] )  Assessment & Plan  · Resolved  · Creatinine on admission 1 22-->1 40  Baseline 0 6-0 8  · Likely due to stone, and sepsis  · S/P IV resusciation in the ED  · Crt improving: Most recent 0 81  · Will discontinue IV resuscitation, patient with improved oral intake  · Avoid/Limit nephrotoxins  · Avoid hypotension fluctuations of blood pressure  · Continue monitor renal function    HTN (hypertension)  Assessment & Plan  · BP reviewed and is stable  · Home medications held in the setting of ADILSON, and hypotension  Since resolved  · Lisinopril   · Plan to restart home lisinopril  · Continue to monitor BP      Medical Problems             Resolved Problems  Date Reviewed: 12/31/2021    None              Discharging Physician / Practitioner: Queta Jones  PCP: Richar Natarajan DO  Admission Date:   Admission Orders (From admission, onward)     Ordered        12/29/21 1218  Inpatient Admission  Once            12/28/21 2006  Place in Observation  Once                      Discharge Date: 12/31/21    Consultations During Hospital Stay:  · Urology  · Infectious disease    Procedures Performed:   · (12/29):  Cystoscopy retrograde pyelogram with insertion of left ureteral stent, DVIU    Significant Findings / Test Results:   · CXR:   · No active pulmonary disease on examination which is somewhat limited secondary to low lung volumes  In the setting of clinically suspected/proven COVID-19, this plain film appearance does not contain findings that raise concern for viral pneumonia such as COVID-19, but does not rule out this diagnosis  · CT Abd/Pel:  · Exam is significantly LIMITED both by motion artifact   LEFT SIDE RENAL COLIC with 8 x 6 x 5 mm calculus in the proximal ureter resulting MILD HYDRONEPHROSIS  Additional 5 mm nonobstructing stones/stone fragments in the LEFT lower pole  Moderate amounts of formed stool the right colon  No obstruction  URINARY BLADDER WALL THICKENING which may be due to trabeculation/incomplete distention  CYSTITIS could also cause this appearance although there is no perivesicular inflammation  Incidental Findings:   · None    Test Results Pending at Discharge (will require follow up): · None     Outpatient Tests Requested:  · Patient will need outpatient follow-up with PCP  · Patient will need outpatient follow-up with Urology    Complications:  None    Reason for Admission:  SIRS    Hospital Course:   Stephon Hand is a 35 y o  male patient who originally presented to the hospital on 12/28/2021 due to systemic inflammatory response syndrome  Patient with past medical history urethral stricture, GERD, morbid obesity, and hypertension Patient presented to the ED with left-sided abdominal and flank pain  Upon arrival to the ED patient met SIRS criteria with tachypnea, tachycardia, febrile, and acute renal dysfunction  Imaging revealed ureteral calculi resulting in hydronephrosis  Urology was consulted  Patient underwent cysto and ureteral stent placement  Patient's acute renal function improved with IV hydration  Blood cultures positive on admission, Infectious Disease was consulted  Patient was treated with IV cefepime for a total of 3 days  Patient will need to continue taking antibiotics for a total of 10 days  Patient will need outpatient follow-up with PCP  Patient will need outpatient follow-up with Urology  Please see above list of diagnoses and related plan for additional information  Condition at Discharge: stable    Discharge Day Visit / Exam:   Subjective: The patient states he feels a little bloated today  Patient denies any active chest pain, shortness of breath, abdominal pain, nausea, vomiting, or diarrhea    Patient received application for outpatient transportation services upon request     Vitals: Blood Pressure: 131/72 (12/31/21 0716)  Pulse: 87 (12/31/21 0716)  Temperature: 99 9 °F (37 7 °C) (12/31/21 0716)  Temp Source: Tympanic (12/31/21 0716)  Respirations: 19 (12/31/21 0716)  Height: 5' 11" (180 3 cm) (12/29/21 0740)  Weight - Scale: (!) 196 kg (433 lb) (12/28/21 1307)  SpO2: 94 % (12/31/21 0716)  Exam:   Physical Exam  Vitals and nursing note reviewed  Constitutional:       General: He is not in acute distress  Appearance: Normal appearance  He is obese  He is not ill-appearing  HENT:      Head: Normocephalic  Nose: Nose normal  No congestion  Mouth/Throat:      Mouth: Mucous membranes are moist       Pharynx: Oropharynx is clear  Eyes:      Pupils: Pupils are equal, round, and reactive to light  Cardiovascular:      Rate and Rhythm: Normal rate and regular rhythm  Pulses: Normal pulses  Heart sounds: Normal heart sounds  No murmur heard  Pulmonary:      Effort: Pulmonary effort is normal  No respiratory distress  Breath sounds: Normal breath sounds  Abdominal:      General: Abdomen is flat  Bowel sounds are normal  There is no distension  Palpations: Abdomen is soft  Tenderness: There is no abdominal tenderness  Musculoskeletal:         General: No swelling  Normal range of motion  Cervical back: Normal range of motion  No tenderness  Right lower leg: No edema  Left lower leg: No edema  Skin:     General: Skin is warm and dry  Capillary Refill: Capillary refill takes less than 2 seconds  Neurological:      General: No focal deficit present  Mental Status: He is alert and oriented to person, place, and time  Mental status is at baseline  Psychiatric:         Attention and Perception: Attention normal          Mood and Affect: Mood normal          Speech: Speech normal          Behavior: Behavior normal  Behavior is cooperative  Thought Content: Thought content normal          Judgment: Judgment normal           Discussion with Family: Patient declined call to   Discharge instructions/Information to patient and family:   See after visit summary for information provided to patient and family  Provisions for Follow-Up Care:  See after visit summary for information related to follow-up care and any pertinent home health orders  Disposition:   Home    Planned Readmission:  No     Discharge Statement:  I spent 60 minutes discharging the patient  This time was spent on the day of discharge  I had direct contact with the patient on the day of discharge  Greater than 50% of the total time was spent examining patient, answering all patient questions, arranging and discussing plan of care with patient as well as directly providing post-discharge instructions  Additional time then spent on discharge activities  Discharge Medications:  See after visit summary for reconciled discharge medications provided to patient and/or family        **Please Note: This note may have been constructed using a voice recognition system**

## 2021-12-31 NOTE — ASSESSMENT & PLAN NOTE
· CT Abd: 8 mm stone left proximal ureter mild hydronephrosis  · S/p cysto with ureteral stent placement 12/29  · Urology following, appreciate recommendations  · S/p cysto with left stent DVIU  Awaiting cultures  Will DC Gil catheter    · Patient with no difficulties voiding  · Will need outpatient follow-up with Urology

## 2021-12-31 NOTE — PLAN OF CARE
Problem: Potential for Falls  Goal: Patient will remain free of falls  Description: INTERVENTIONS:  - Educate patient/family on patient safety including physical limitations  - Instruct patient to call for assistance with activity   - Consult OT/PT to assist with strengthening/mobility   - Keep Call bell within reach  - Keep bed low and locked with side rails adjusted as appropriate  - Keep care items and personal belongings within reach  - Initiate and maintain comfort rounds  - Offer Toileting every 2 Hours, in advance of need  - Initiate/Maintain bed alarm  - Apply yellow socks and bracelet for high fall risk patients  - Consider moving patient to room near nurses station  Outcome: Progressing     Problem: GENITOURINARY - ADULT  Goal: Absence of urinary retention  Description: INTERVENTIONS:  - Assess patients ability to void and empty bladder  - Monitor I/O  - Bladder scan as needed  - Discuss with physician/AP medications to alleviate retention as needed  - Discuss catheterization for long term situations as appropriate  Outcome: Progressing

## 2021-12-31 NOTE — ASSESSMENT & PLAN NOTE
· BP reviewed and is stable  · Home medications held in the setting of ADILSON, and hypotension    Since resolved  · Lisinopril   · Plan to restart home lisinopril  · Continue to monitor BP

## 2021-12-31 NOTE — ASSESSMENT & PLAN NOTE
· Resolved  · Creatinine on admission 1 22-->1 40  Baseline 0 6-0 8  · Likely due to stone, and sepsis  · S/P IV resusciation in the ED  · Crt improving: Most recent 0 81  · Will discontinue IV resuscitation, patient with improved oral intake    · Avoid/Limit nephrotoxins  · Avoid hypotension fluctuations of blood pressure  · Continue monitor renal function

## 2022-01-04 LAB
BACTERIA BLD CULT: NORMAL
BACTERIA BLD CULT: NORMAL

## 2022-01-06 ENCOUNTER — ANESTHESIA EVENT (OUTPATIENT)
Dept: PERIOP | Facility: HOSPITAL | Age: 42
End: 2022-01-06
Payer: COMMERCIAL

## 2022-01-06 NOTE — PRE-PROCEDURE INSTRUCTIONS
Pre-Surgery Instructions:   Medication Instructions    acetaminophen (TYLENOL) 325 mg tablet ok to take DOP w/ a sip of water if needed    docusate sodium (COLACE) 100 mg capsule ok to take DOP w/ a sip of water if needed    lisinopril (ZESTRIL) 40 mg tablet Hold DOP    omeprazole (PriLOSEC) 20 mg delayed release capsule ok to take DOP w/ a sip of water    sulfamethoxazole-trimethoprim (BACTRIM DS) 800-160 mg per tablet will complete Rx by 1/7/22     Pt verbalizes understanding of the following:    - Bathing instructions, has chg, neck down, no genitals  - No lotions, powders, sprays, deodorant, jewelry    - NPO after MN  - Avoid NSAIDs 3 days prior  - Avoid ASA 5 days prior  - Avoid OTC Vit/ Suppl/ Herbals 7 days prior  - Bring list of meds with last dose noted  - [employer] cards & photo id

## 2022-01-12 NOTE — H&P
H&P Exam - Urology       Patient: Sowmya Chamberlain   : 1980 Sex: male   MRN: [id number]     CSN: [id number]      History of Present Illness   HPI:  Sowmya Chamberlain is a 35 y o  male who presents with left stone/stent  Status post admission for urosepsis on  undergoing emergency cysto left stent placement for 8 mm left stone  Patient also has history of urethral stricture disease        Review of Systems:   Constitutional:  Negative for activity change, fever, chills and diaphoresis  HENT: Negative for hearing loss and trouble swallowing  Eyes: Negative for itching and visual disturbance  Respiratory: Negative for chest tightness and shortness of breath  Cardiovascular: Negative for chest pain, edema  Gastrointestinal: Negative for abdominal distention, na abdominal pain, constipation, diarrhea, Nausea and vomiting  Genitourinary: Negative for decreased urine volume, difficulty urinating, dysuria, enuresis, frequency, hematuria and urgency  Musculoskeletal: Negative for gait problem and myalgias  Neurological: Negative for dizziness and headaches  Hematological: Does not bruise/bleed easily  Historical Information   Past Medical History:   Diagnosis Date    ADILSON [facility]acute [facility]) ([facility] [address]     Anxiety     GERD (gastroesophageal reflux disease)     Hypertension     Kidney stone     Murmur, cardiac     Stress incontinence     Urethral stricture     Use of cane as ambulatory aid     as needed    Wears glasses      Past Surgical History:   Procedure Laterality Date    CHOLECYSTECTOMY          CYSTOSCOPY N/A 10/19/2021    Procedure: CYSTOSCOPY;  Surgeon: Gutierrez Valdez MD;  [facility];  Service: Urology    WA CYSTOSCOPY,DIR VIS INT URETHROTOMY N/A 10/19/2021    Procedure: DIRECT VISUAL INTERAL URETHROTOMY (DVIU);   Surgeon: Gutierrez Valdez MD;  [facility];  Service: Urology    WA CYSTOURETHROSCOPY,URETER CATHETER Left 12/29/2021    Procedure: CYSTOSCOPY RETROGRADE PYELOGRAM WITH INSERTION STENT URETERAL LEFT, DVIU;  Surgeon: Denilson Scott MD;  Location:  MAIN OR;  Service: Urology     Social History   Social History     Substance and Sexual Activity   Alcohol Use Yes    Comment: rarely     Social History     Substance and Sexual Activity   Drug Use Never     Social History     Tobacco Use   Smoking Status Former Smoker    Types: Cigarettes   Smokeless Tobacco Never Used     Family History: No family history on file  Meds/Allergies   No medications prior to admission  No Known Allergies    Objective   Vitals: Ht 5' 11" (1 803 m)   Wt (!) 196 kg (433 lb)   BMI 60 39 kg/m²     Physical Exam:  General Alert awake   Normocephalic atraumatic PERRLA  Lungs clear bilaterally  Cardiac normal S1 normal S2  Abdomen soft, flank pain  Extremities no edema    No intake/output data recorded      Invasive Devices  Report    None                     Lab Results: CBC:   Lab Results   Component Value Date    WBC 12 10 (H) 12/31/2021    HGB 11 6 (L) 12/31/2021    HCT 36 4 (L) 12/31/2021    MCV 85 12/31/2021     12/31/2021    MCH 27 0 12/31/2021    MCHC 31 9 12/31/2021    RDW 15 4 (H) 12/31/2021    MPV 10 1 12/31/2021    NRBC 0 12/31/2021     CMP:   Lab Results   Component Value Date     05/01/2017     12/31/2021     05/01/2017    CO2 30 12/31/2021    CO2 27 05/01/2017    BUN 19 12/31/2021    BUN 14 05/01/2017    CREATININE 0 81 12/31/2021    CREATININE 0 75 05/01/2017    CALCIUM 9 0 12/31/2021    CALCIUM 9 4 05/01/2017    AST 17 12/30/2021    AST 18 05/01/2017    ALT 18 12/30/2021    ALT 18 05/01/2017    ALKPHOS 43 7 12/30/2021    ALKPHOS 46 05/01/2017    PROT 7 7 05/01/2017    BILITOT 0 5 05/01/2017    EGFR 108 12/31/2021     Urinalysis:   Lab Results   Component Value Date    COLORU Yellow 12/28/2021    COLORU YELLOW 08/17/2016    CLARITYU Clear 12/28/2021    SPECGRAV 1 025 12/28/2021    SPECGRAV 1 025 08/17/2016    PHUR 5 5 12/28/2021    PHUR 6 0 08/17/2016    LEUKOCYTESUR Trace (A) 12/28/2021    LEUKOCYTESUR NEGATIVE 08/17/2016    NITRITE Negative 12/28/2021    NITRITE NEGATIVE 08/17/2016    PROTEINUA NEGATIVE 08/17/2016    GLUCOSEU Negative 12/28/2021    KETONESU Negative 12/28/2021    KETONESU NEGATIVE 08/17/2016    BILIRUBINUR Negative 12/28/2021    BILIRUBINUR NEGATIVE 08/17/2016    BLOODU 3+ (A) 12/28/2021     Urine Culture:   Lab Results   Component Value Date    URINECX 50,000-59,000 cfu/ml Klebsiella pneumoniae (A) 12/29/2021     PSA: No results found for: PSA        Assessment/ Plan:  Cysto/left ureteroscopy/laser/stent      Kimberly Griffith MD

## 2022-01-13 PROBLEM — F41.9 ANXIETY: Status: ACTIVE | Noted: 2022-01-13

## 2022-01-13 PROBLEM — E66.01 MORBID OBESITY (HCC): Status: ACTIVE | Noted: 2022-01-13

## 2022-01-13 PROBLEM — K21.9 GASTROESOPHAGEAL REFLUX DISEASE: Status: ACTIVE | Noted: 2022-01-13

## 2022-01-13 NOTE — ANESTHESIA PREPROCEDURE EVALUATION
Procedure:  CYSTOSCOPY, URETEROSCOPY, LASER LITHOTRIPSY, BASKET EXTRACTION, AND STENT PLACEMENT (Left Bladder)    Relevant Problems   CARDIO   (+) HTN (hypertension)      GI/HEPATIC   (+) Gastroesophageal reflux disease      /RENAL   (+) ADILSON (acute kidney injury) (HCC)   (+) Renal calculus      NEURO/PSYCH   (+) Anxiety      Other   (+) Morbid obesity (HCC)        Physical Exam    Airway    Mallampati score: IV  TM Distance: >3 FB  Neck ROM: full     Dental       Cardiovascular  Rhythm: regular, Rate: normal,     Pulmonary  Breath sounds clear to auscultation,     Other Findings        Anesthesia Plan  ASA Score- 3     Anesthesia Type- general with ASA Monitors  Additional Monitors:   Airway Plan: LMA  Plan Factors-    Chart reviewed  Patient is not a current smoker  Induction- intravenous  Postoperative Plan- Plan for postoperative opioid use  Informed Consent- Anesthetic plan and risks discussed with patient  I personally reviewed this patient with the CRNA  Discussed and agreed on the Anesthesia Plan with the GERALDINE Rob

## 2022-01-14 ENCOUNTER — HOSPITAL ENCOUNTER (OUTPATIENT)
Facility: HOSPITAL | Age: 42
Setting detail: OUTPATIENT SURGERY
Discharge: HOME | End: 2022-01-14
Attending: SPECIALIST | Admitting: SPECIALIST
Payer: COMMERCIAL

## 2022-01-14 ENCOUNTER — HOSPITAL ENCOUNTER (OUTPATIENT)
Dept: RADIOLOGY | Facility: HOSPITAL | Age: 42
Setting detail: OUTPATIENT SURGERY
Discharge: HOME | End: 2022-01-14
Payer: COMMERCIAL

## 2022-01-14 ENCOUNTER — ANESTHESIA (OUTPATIENT)
Dept: PERIOP | Facility: HOSPITAL | Age: 42
End: 2022-01-14
Payer: COMMERCIAL

## 2022-01-14 VITALS
SYSTOLIC BLOOD PRESSURE: 130 MMHG | OXYGEN SATURATION: 94 % | RESPIRATION RATE: 18 BRPM | BODY MASS INDEX: 44.1 KG/M2 | WEIGHT: 315 LBS | DIASTOLIC BLOOD PRESSURE: 60 MMHG | HEART RATE: 103 BPM | HEIGHT: 71 IN | TEMPERATURE: 98.4 F

## 2022-01-14 DIAGNOSIS — N20.0 CALCULUS OF KIDNEY: ICD-10-CM

## 2022-01-14 PROCEDURE — 87086 URINE CULTURE/COLONY COUNT: CPT | Performed by: SPECIALIST

## 2022-01-14 PROCEDURE — C1769 GUIDE WIRE: HCPCS | Performed by: SPECIALIST

## 2022-01-14 PROCEDURE — 74450 X-RAY URETHRA/BLADDER: CPT

## 2022-01-14 PROCEDURE — C2617 STENT, NON-COR, TEM W/O DEL: HCPCS | Performed by: SPECIALIST

## 2022-01-14 DEVICE — INLAY OPTIMA URETERAL STENT W/O GUIDEWIRE
Type: IMPLANTABLE DEVICE | Status: NON-FUNCTIONAL
Brand: BARD® INLAY OPTIMA® URETERAL STENT
Removed: 2022-02-23

## 2022-01-14 RX ORDER — MIDAZOLAM HYDROCHLORIDE 2 MG/2ML
INJECTION, SOLUTION INTRAMUSCULAR; INTRAVENOUS AS NEEDED
Status: DISCONTINUED | OUTPATIENT
Start: 2022-01-14 | End: 2022-01-14

## 2022-01-14 RX ORDER — MAGNESIUM HYDROXIDE 1200 MG/15ML
LIQUID ORAL AS NEEDED
Status: DISCONTINUED | OUTPATIENT
Start: 2022-01-14 | End: 2022-01-14 | Stop reason: HOSPADM

## 2022-01-14 RX ORDER — ACETAMINOPHEN 325 MG/1
650 TABLET ORAL ONCE AS NEEDED
Status: COMPLETED | OUTPATIENT
Start: 2022-01-14 | End: 2022-01-14

## 2022-01-14 RX ORDER — SODIUM CHLORIDE, SODIUM LACTATE, POTASSIUM CHLORIDE, CALCIUM CHLORIDE 600; 310; 30; 20 MG/100ML; MG/100ML; MG/100ML; MG/100ML
75 INJECTION, SOLUTION INTRAVENOUS CONTINUOUS
Status: DISCONTINUED | OUTPATIENT
Start: 2022-01-14 | End: 2022-01-14 | Stop reason: HOSPADM

## 2022-01-14 RX ORDER — ONDANSETRON 2 MG/ML
4 INJECTION INTRAMUSCULAR; INTRAVENOUS ONCE AS NEEDED
Status: DISCONTINUED | OUTPATIENT
Start: 2022-01-14 | End: 2022-01-14 | Stop reason: HOSPADM

## 2022-01-14 RX ORDER — SODIUM CHLORIDE, SODIUM LACTATE, POTASSIUM CHLORIDE, CALCIUM CHLORIDE 600; 310; 30; 20 MG/100ML; MG/100ML; MG/100ML; MG/100ML
100 INJECTION, SOLUTION INTRAVENOUS CONTINUOUS
Status: DISCONTINUED | OUTPATIENT
Start: 2022-01-14 | End: 2022-01-14 | Stop reason: HOSPADM

## 2022-01-14 RX ORDER — SODIUM CHLORIDE, SODIUM LACTATE, POTASSIUM CHLORIDE, CALCIUM CHLORIDE 600; 310; 30; 20 MG/100ML; MG/100ML; MG/100ML; MG/100ML
INJECTION, SOLUTION INTRAVENOUS CONTINUOUS PRN
Status: DISCONTINUED | OUTPATIENT
Start: 2022-01-14 | End: 2022-01-14

## 2022-01-14 RX ORDER — LIDOCAINE HYDROCHLORIDE 10 MG/ML
INJECTION, SOLUTION EPIDURAL; INFILTRATION; INTRACAUDAL; PERINEURAL AS NEEDED
Status: DISCONTINUED | OUTPATIENT
Start: 2022-01-14 | End: 2022-01-14

## 2022-01-14 RX ORDER — FENTANYL CITRATE 50 UG/ML
INJECTION, SOLUTION INTRAMUSCULAR; INTRAVENOUS AS NEEDED
Status: DISCONTINUED | OUTPATIENT
Start: 2022-01-14 | End: 2022-01-14

## 2022-01-14 RX ORDER — FENTANYL CITRATE/PF 50 MCG/ML
25 SYRINGE (ML) INJECTION
Status: DISCONTINUED | OUTPATIENT
Start: 2022-01-14 | End: 2022-01-14 | Stop reason: HOSPADM

## 2022-01-14 RX ORDER — DEXAMETHASONE SODIUM PHOSPHATE 10 MG/ML
INJECTION, SOLUTION INTRAMUSCULAR; INTRAVENOUS AS NEEDED
Status: DISCONTINUED | OUTPATIENT
Start: 2022-01-14 | End: 2022-01-14

## 2022-01-14 RX ORDER — ONDANSETRON 2 MG/ML
INJECTION INTRAMUSCULAR; INTRAVENOUS AS NEEDED
Status: DISCONTINUED | OUTPATIENT
Start: 2022-01-14 | End: 2022-01-14

## 2022-01-14 RX ORDER — SUCCINYLCHOLINE/SOD CL,ISO/PF 100 MG/5ML
SYRINGE (ML) INTRAVENOUS AS NEEDED
Status: DISCONTINUED | OUTPATIENT
Start: 2022-01-14 | End: 2022-01-14

## 2022-01-14 RX ORDER — PROPOFOL 10 MG/ML
INJECTION, EMULSION INTRAVENOUS AS NEEDED
Status: DISCONTINUED | OUTPATIENT
Start: 2022-01-14 | End: 2022-01-14

## 2022-01-14 RX ADMIN — CEFAZOLIN 3000 MG: 1 INJECTION, POWDER, FOR SOLUTION INTRAMUSCULAR; INTRAVENOUS at 08:34

## 2022-01-14 RX ADMIN — ACETAMINOPHEN 650 MG: 325 TABLET, FILM COATED ORAL at 11:01

## 2022-01-14 RX ADMIN — Medication 100 MG: at 08:31

## 2022-01-14 RX ADMIN — SODIUM CHLORIDE, SODIUM LACTATE, POTASSIUM CHLORIDE, AND CALCIUM CHLORIDE: .6; .31; .03; .02 INJECTION, SOLUTION INTRAVENOUS at 08:17

## 2022-01-14 RX ADMIN — ONDANSETRON 4 MG: 2 INJECTION INTRAMUSCULAR; INTRAVENOUS at 09:18

## 2022-01-14 RX ADMIN — DEXAMETHASONE SODIUM PHOSPHATE 4 MG: 10 INJECTION, SOLUTION INTRAMUSCULAR; INTRAVENOUS at 08:31

## 2022-01-14 RX ADMIN — PROPOFOL 200 MG: 10 INJECTION, EMULSION INTRAVENOUS at 08:31

## 2022-01-14 RX ADMIN — MIDAZOLAM HYDROCHLORIDE 2 MG: 1 INJECTION, SOLUTION INTRAMUSCULAR; INTRAVENOUS at 08:17

## 2022-01-14 RX ADMIN — FENTANYL CITRATE 50 MCG: 50 INJECTION, SOLUTION INTRAMUSCULAR; INTRAVENOUS at 08:30

## 2022-01-14 RX ADMIN — SODIUM CHLORIDE, SODIUM LACTATE, POTASSIUM CHLORIDE, AND CALCIUM CHLORIDE 75 ML/HR: .6; .31; .03; .02 INJECTION, SOLUTION INTRAVENOUS at 07:38

## 2022-01-14 RX ADMIN — LIDOCAINE HYDROCHLORIDE 50 MG: 10 INJECTION, SOLUTION EPIDURAL; INFILTRATION; INTRACAUDAL; PERINEURAL at 08:30

## 2022-01-14 RX ADMIN — PROPOFOL 200 MG: 10 INJECTION, EMULSION INTRAVENOUS at 08:30

## 2022-01-14 NOTE — PROGRESS NOTES
Progress Note - Urology      Patient: Zahira Ponce   : 1980 Sex: male   MRN: [id number]     CSN: [id number]  Unit/Bed#: OR POOL     SUBJECTIVE:   No change history/physical  Left 8mm stone  Patient has temperature 100 7°  History of chronic UTIs could possibly now have involving acute cystitis infected stent? Objective   Vitals: /61   Pulse 102   Temp (!) 100 7 °F (38 2 °C) (Temporal)   Resp 21   Ht 5' 11" (1 803 m)   Wt (!) 191 kg (420 lb)   SpO2 97%   BMI 58 58 kg/m²     No intake/output data recorded        Physical Exam:   General Alert awake   Normocephalic atraumatic PERRLA  Lungs clear bilaterally  Cardiac normal S1 normal S2  Abdomen soft, flank pain  Extremities no edema      Lab Results: CBC:   Lab Results   Component Value Date    WBC 12 10 (H) 2021    HGB 11 6 (L) 2021    HCT 36 4 (L) 2021    MCV 85 2021     2021    MCH 27 0 2021    MCHC 31 9 2021    RDW 15 4 (H) 2021    MPV 10 1 2021    NRBC 0 2021     CMP:   Lab Results   Component Value Date     2017     2021     2017    CO2 30 2021    CO2 27 2017    BUN 19 2021    BUN 14 2017    CREATININE 0 81 2021    CREATININE 0 75 2017    CALCIUM 9 0 2021    CALCIUM 9 4 2017    AST 17 2021    AST 18 2017    ALT 18 2021    ALT 18 2017    ALKPHOS 43 7 2021    ALKPHOS 46 2017    PROT 7 7 2017    BILITOT 0 5 2017    EGFR 108 2021     Urinalysis:   Lab Results   Component Value Date    COLORU Yellow 2021    COLORU YELLOW 2016    CLARITYU Clear 2021    SPECGRAV 1 025 2021    SPECGRAV 1 025 2016    PHUR 5 5 2021    PHUR 6 0 2016    LEUKOCYTESUR Trace (A) 2021    LEUKOCYTESUR NEGATIVE 2016    NITRITE Negative 2021    NITRITE NEGATIVE 2016    PROTEINUA NEGATIVE 2016 GLUCOSEU Negative 12/28/2021    KETONESU Negative 12/28/2021    KETONESU NEGATIVE 08/17/2016    BILIRUBINUR Negative 12/28/2021    BILIRUBINUR NEGATIVE 08/17/2016    BLOODU 3+ (A) 12/28/2021     Urine Culture:   Lab Results   Component Value Date    URINECX 50,000-59,000 cfu/ml Klebsiella pneumoniae (A) 12/29/2021     PSA: No results found for: PSA      Assessment/ Plan:  Long discussion with patient in holding area will undergo cystoscopy if urine looks infected will change stent today but stone could not be addressed until appropriate antibiotics and infection has cleared ureteroscopy will need to be aborted if no infection found would proceed            Puja Sanches MD

## 2022-01-14 NOTE — DISCHARGE INSTRUCTIONS
#1 no heavy straining or lifting above 10 pounds for 2 weeks    #2 call office fevers, chills, or worsening blood in the urine  #3 Patient to follow up in the office on January 24th to check urine will be rescheduled for left ureteroscopy on January 28th at [facility] to remove stone      Lobito BERMAN D  [address] [facility] office  [address] [place] [address]  [phone]  8:30 AM to 4:30 PM  Monday through Friday    [facility] [place] office  [phone]  [facility], [address] [place] [address]  [phone]  1:00 to 5:00 PM  Wednesday     Cystoscopy   WHAT YOU NEED TO KNOW:   A cystoscopy is a procedure to look inside of your urethra and bladder using a cystoscope  A cystoscope is a small tube with a light and magnifying camera on the end  The procedure is used to diagnose and treat conditions of the bladder, urethra, and prostate  The procedure is also done to remove stones or blood clots from the urethra or bladder  Your healthcare provider may do other tests, such as ureteroscopy, during a cystoscopy  DISCHARGE INSTRUCTIONS:   Call 911 if:   · You suddenly have chest pain or trouble breathing  Seek care immediately if:   · Your urine turns from pink to red, or you have clots in your urine  · You cannot urinate and your bladder feels full  · Your pain or burning becomes worse or lasts longer than 2 days  Contact your healthcare provider or urologist if:   · Your urine stays pink for longer than 3 days  · You urinate less than normal, or still feel like you have to urinate after you use the bathroom  · Your skin is itchy, swollen, or has a new rash  · You have a fever and chills  · You have questions or concerns about your condition or care  Medicines: You may  be given any of the following:  · Antibiotics  help treat or prevent a bacterial infection  · Acetaminophen  decreases pain and fever  It is available without a doctor's order   Ask how much to take and how often to take it  Follow directions  Read the labels of all other medicines you are using to see if they also contain acetaminophen, or ask your doctor or pharmacist  Acetaminophen can cause liver damage if not taken correctly  Do not use more than 4 grams (4,000 milligrams) total of acetaminophen in one day  · Take your medicine as directed  Contact your healthcare provider if you think your medicine is not helping or if you have side effects  Tell him or her if you are allergic to any medicine  Keep a list of the medicines, vitamins, and herbs you take  Include the amounts, and when and why you take them  Bring the list or the pill bottles to follow-up visits  Carry your medicine list with you in case of an emergency  Follow up with your healthcare provider as directed: You may need to have another cystoscopy  Write down your questions so you remember to ask them during your visits  Self-care:   · Drink at least 3 to 4 glasses of water daily for 2 days after your procedure  Do not drink acidic juices such as orange juice and lemonade  Drink water to help prevent blood clots from forming  It can also help decrease the amount of acid in your urine  Acid in your urine may increase the burning feeling when you urinate  · Sit in a warm tub of water  Warm water may relieve pain and bladder spasms  · Do not have sex  until your healthcare provider tells you it is okay  Sex may increase your risk for a urinary tract infection  © 2017 [address] Information is for End User's use only and may not be sold, redistributed or otherwise used for commercial purposes  All illustrations and images included in CareNotes® are the copyrighted property of A D A M , Inc  or Andrey Roth  The above information is an  only  It is not intended as medical advice for individual conditions or treatments   Talk to your doctor, nurse or pharmacist before following any medical regimen to see if it is safe and effective for you  Ureteral Stent Placement   WHAT YOU NEED TO KNOW:   Ureteral stent placement is a procedure to open a blocked or narrow ureter  The ureter is the tube that carries urine from your kidney into your bladder  A stent is a thin hollow plastic tube used to hold your ureter open and allow urine to flow  The stent may stay in for several weeks  Long-term stents will stay in longer and need to be replaced within a certain period of time  DISCHARGE INSTRUCTIONS:   Seek care immediately if:   · You urinate very little or not at all  · You have severe pain in your abdomen, even after you take medicine  · You have heavy bleeding from your urethra  · You see large blood clots in your urine, or your urine is bright red  Contact your healthcare provider or urologist if:   · You have a fever or chills  · You feel like you need to urinate very often  · Your symptoms get worse, or you develop new symptoms  · You have questions or concerns about your condition or care  Medicines: You may  need any of the following:  · Acetaminophen  decreases pain and fever  It is available without a doctor's order  Ask how much to take and how often to take it  Follow directions  Read the labels of all other medicines you are using to see if they also contain acetaminophen, or ask your doctor or pharmacist  Acetaminophen can cause liver damage if not taken correctly  Do not use more than 4 grams (4,000 milligrams) total of acetaminophen in one day  · Prescription pain medicine  may be given  Ask your healthcare provider how to take this medicine safely  Some prescription pain medicines contain acetaminophen  Do not take other medicines that contain acetaminophen without talking to your healthcare provider  Too much acetaminophen may cause liver damage  Prescription pain medicine may cause constipation  Ask your healthcare provider how to prevent or treat constipation  · Antibiotics  help prevent or treat bacterial infections  Your healthcare provider may prescribe these for you while you have a ureteral stent  · Take your medicine as directed  Contact your healthcare provider if you think your medicine is not helping or if you have side effects  Tell him or her if you are allergic to any medicine  Keep a list of the medicines, vitamins, and herbs you take  Include the amounts, and when and why you take them  Bring the list or the pill bottles to follow-up visits  Carry your medicine list with you in case of an emergency  Self-care:   · Drink liquids as directed  Liquids will help flush your urinary tract and prevent infection  Ask your healthcare provider how much liquid to drink each day and which liquids are best for you  · Ask when you can return to daily activities  You may be able to return to normal activities the day after your stent placement  Follow up with your urologist as directed: You will need regular follow-up visits with your urologist as long as you have a stent  He or she will check to make sure the stent is working properly  He or she will remove your temporary stent in several weeks  Your provider may do urine cultures to check for infection  Write down your questions so you remember to ask them during your visits  © Copyright [employer] 2021 Information is for End User's use only and may not be sold, redistributed or otherwise used for commercial purposes  All illustrations and images included in CareNotes® are the copyrighted property of A [employer]  or [employer]  The above information is an  only  It is not intended as medical advice for individual conditions or treatments  Talk to your doctor, nurse or pharmacist before following any medical regimen to see if it is safe and effective for you

## 2022-01-14 NOTE — ANESTHESIA POSTPROCEDURE EVALUATION
Post-Op Assessment Note    CV Status:  Stable    Pain management: adequate     Mental Status:  Alert and awake   Hydration Status:  Stable   PONV Controlled:  None   Airway Patency:  Patent and adequate      Post Op Vitals Reviewed: Yes      Staff: Anesthesiologist, CRNA         No complications documented      BP   125/60   Temp 98 8   Pulse 103   Resp   30   SpO2 99% on RA

## 2022-01-14 NOTE — OP NOTE
PERATIVE REPORT  PATIENT NAME: Mary Spears    :  1980  MRN: [id number]  Pt Location:  OR ROOM 01    SURGERY DATE: 2022    Surgeon(s) and Role:     * Shawn Shanks MD - Primary    Preop Diagnosis:  Calculus of kidney [A79 5]  Complicated UTI    Post-Op Diagnosis Codes:     * Calculus of kidney [U19 4]  Complicated UTI  Migrated stent    Procedure(s) (LRB):  CYSTOSCOPY, left URETEROSCOPY, removal of migrated stent, insertion new left ureteral stent placement  (Left) stone manipulation    Specimen(s):  ID Type Source Tests Collected by Time Destination   A :  Urine Urine, Cystoscopic URINE CULTURE Shawn Shanks MD 2022 [id number]        Estimated Blood Loss:   Minimal    Drains:  Ureteral Drain/Stent Left ureter (Active)   Number of days: 0       Anesthesia Type:   IV Sedation with Anesthesia    Operative Indications:  Calculus of kidney [N20 0]  This 42-year-old morbidly obese male undergoing emergency cysto left stent placement for complicated UTI left pyonephrosis 8 mm proximal stone returns to the OR today to undergo cysto left ureteroscopy stone extraction stent exchange  Patient was recently seen in the office finishing a course of antibiotics found to have no infection patient seen in the preop area today with no obvious complaint found to have a temperature of 100 7° no respiratory changes possible involving recurrent cystitis discussed with patient will undergo cystoscopy if urine appears infected stent change will be performed in light of infected stent left ureteroscopy stone extraction would be aborted till appropriate cultures and antibiotics obtained    Operative Findings:  Urine concentrated but clear infected?   Migrated stent requiring rigid ureteroscopy into the distal ureter to remove infected stent with basket Severe distal clarisa ureteral inflammation from migrated stent noted  Stone appeared to migrate into upper pole left renal pelvis on retrograde  28 cm stent placed in appropriate position  Left laser lithotripsy aborted will reschedule in a few weeks after inflammation clears and no obvious infection    Complications:   None    Procedure and Technique:  After the patient identified in the holding area found to have temperature of a 107° with history of complicated previous UTI and stent placement discussed possibly changing procedure to just cysto stent exchange in light of involving complicated infection patient placed in the op suite after anesthesia induced placed on thigh position draped and prepped in standard fashion  Time-out performed  Twenty-two Mozambican cystoscope passed through the urethra into the bladder    Anterior urethra confirmed previous urethral bulbar stricture now open posterior urethra confirmed 0 5 cm prostatic urethra as the scope was inserted to the bladder lumen urine was concentrated but clear culture sent left orifice was easily visualized with no obvious stent noted fluoroscopic views confirmed stent migration a 0 038 wire was passed up the left ureter into the left renal pelvis and left a safety the rigid ureteral scope was then easily passed into the orifice severe edema noted surrounding the proximal stent and initial attempts at removing stent with 2 different nitinol stone extractor baskets were used were unsuccessful finally the helical stone basket places and the stent was removed with out difficulty there appeared to be mucosal tear is from severe inflammation and migrated stent once the migrated stent was removed on view again and with rigid ureteroscopy the scope was placed retrograde now confirmed the stone had migrated from the mid ureter up into the upper pole calyx which definitely would require flexible ureteroscopy in light of distal ureteral trauma and slight infection aborted 28 cm 6 Mozambican stent placed in excellent position wire removed scope removed postop procedure well urine cultures were sent patient will return to the OR in a few weeks for flexible ureteroscopy stone removal   I was present for the entire procedure    Patient Disposition:  PACU       SIGNATURE: John Barillas MD  DATE: January 14, 2022  TIME: 9:28 AM

## 2022-01-16 LAB — BACTERIA UR CULT: NORMAL

## 2022-02-21 ENCOUNTER — ANESTHESIA EVENT (OUTPATIENT)
Dept: PERIOP | Facility: HOSPITAL | Age: 42
End: 2022-02-21
Payer: COMMERCIAL

## 2022-02-21 NOTE — PRE-PROCEDURE INSTRUCTIONS
Pre-Surgery Instructions:   Medication Instructions    acetaminophen (TYLENOL) 325 mg tablet Instructed patient per Anesthesia Guidelines   lisinopril (ZESTRIL) 40 mg tablet Instructed patient per Anesthesia Guidelines   omeprazole (PriLOSEC) 20 mg delayed release capsule Instructed patient per Anesthesia Guidelines  Patient may take his prilosec and if needed by take Tylenol morning of procedure

## 2022-02-22 NOTE — H&P
H&P Exam - Urology       Patient: Yung Shane   : 1980 Sex: male   MRN: [id number]     CSN: [id number]      History of Present Illness   HPI:  Yung Shane is a 35 y o  male who presents with left stent left 9 mm stone to undergo elective left ureteroscopy laser strips E stent exchange aware risk of anesthesia infection bleeding additional urologic procedures        Review of Systems:   Constitutional:  Negative for activity change, fever, chills and diaphoresis  HENT: Negative for hearing loss and trouble swallowing  Eyes: Negative for itching and visual disturbance  Respiratory: Negative for chest tightness and shortness of breath  Cardiovascular: Negative for chest pain, edema  Gastrointestinal: Negative for abdominal distention, na abdominal pain, constipation, diarrhea, Nausea and vomiting  Genitourinary: Negative for decreased urine volume, difficulty urinating, dysuria, enuresis, frequency, hematuria and urgency  Musculoskeletal: Negative for gait problem and myalgias  Neurological: Negative for dizziness and headaches  Hematological: Does not bruise/bleed easily         Historical Information   Past Medical History:   Diagnosis Date    ADILSON (acute kidney injury) ([facility] [address] )     Anxiety     GERD (gastroesophageal reflux disease)     Hypertension     Kidney stone     Murmur, cardiac     Stress incontinence     Urethral stricture     Use of cane as ambulatory aid     as needed    Wears glasses      Past Surgical History:   Procedure Laterality Date    CHOLECYSTECTOMY          CYSTOSCOPY N/A 10/19/2021    Procedure: CYSTOSCOPY;  Surgeon: Kaylee Rob MD;  [facility];  Service: Urology    NY CYSTO/URETERO W/LITHOTRIPSY &INDWELL STENT INSRT Left 2022    Procedure: CYSTOSCOPY, left URETEROSCOPY, removal of migrated stent, insertion new left ureteral stent placement ;  Surgeon: Kimberly Griffith MD;  Location: [facility];  Service: Urology    NY CYSTOSCOPY,DIR VIS INT URETHROTOMY N/A 10/19/2021    Procedure: DIRECT VISUAL INTERAL URETHROTOMY (DVIU); Surgeon: Chelsi Guzman MD;  Location[facility];  Service: Urology    DE CYSTOURETHROSCOPY,URETER CATHETER Left 12/29/2021    Procedure: CYSTOSCOPY RETROGRADE PYELOGRAM WITH INSERTION STENT URETERAL LEFT, DVIU;  Surgeon: Burak Bell MD;  Location:  MAIN OR;  Service: Urology     Social History   Social History     Substance and Sexual Activity   Alcohol Use Not Currently     Social History     Substance and Sexual Activity   Drug Use Never     Social History     Tobacco Use   Smoking Status Former Smoker    Types: Cigarettes   Smokeless Tobacco Never Used     Family History: History reviewed  No pertinent family history  Meds/Allergies   No medications prior to admission  No Known Allergies    Objective   Vitals: Ht 5' 11" (1 803 m)   Wt (!) 191 kg (420 lb)   BMI 58 58 kg/m²     Physical Exam:  General Alert awake   Normocephalic atraumatic PERRLA  Lungs clear bilaterally  Cardiac normal S1 normal S2  Abdomen soft, flank pain  Extremities no edema    No intake/output data recorded      Invasive Devices  Report    Drain            Ureteral Drain/Stent Left ureter 39 days                    Lab Results: CBC:   Lab Results   Component Value Date    WBC 12 10 (H) 12/31/2021    HGB 11 6 (L) 12/31/2021    HCT 36 4 (L) 12/31/2021    MCV 85 12/31/2021     12/31/2021    MCH 27 0 12/31/2021    MCHC 31 9 12/31/2021    RDW 15 4 (H) 12/31/2021    MPV 10 1 12/31/2021    NRBC 0 12/31/2021     CMP:   Lab Results   Component Value Date     05/01/2017     12/31/2021     05/01/2017    CO2 30 12/31/2021    CO2 27 05/01/2017    BUN 19 12/31/2021    BUN 14 05/01/2017    CREATININE 0 81 12/31/2021    CREATININE 0 75 05/01/2017    CALCIUM 9 0 12/31/2021    CALCIUM 9 4 05/01/2017    AST 17 12/30/2021    AST 18 05/01/2017    ALT 18 12/30/2021    ALT 18 05/01/2017    ALKPHOS 43 7 12/30/2021    ALKPHOS 46 05/01/2017    PROT 7 7 05/01/2017    BILITOT 0 5 05/01/2017    EGFR 108 12/31/2021     Urinalysis:   Lab Results   Component Value Date    COLORU Yellow 12/28/2021    COLORU YELLOW 08/17/2016    CLARITYU Clear 12/28/2021    SPECGRAV 1 025 12/28/2021    SPECGRAV 1 025 08/17/2016    PHUR 5 5 12/28/2021    PHUR 6 0 08/17/2016    LEUKOCYTESUR Trace (A) 12/28/2021    LEUKOCYTESUR NEGATIVE 08/17/2016    NITRITE Negative 12/28/2021    NITRITE NEGATIVE 08/17/2016    PROTEINUA NEGATIVE 08/17/2016    GLUCOSEU Negative 12/28/2021    KETONESU Negative 12/28/2021    KETONESU NEGATIVE 08/17/2016    BILIRUBINUR Negative 12/28/2021    BILIRUBINUR NEGATIVE 08/17/2016    BLOODU 3+ (A) 12/28/2021     Urine Culture:   Lab Results   Component Value Date    URINECX No Growth <100 cfu/mL 01/14/2022     PSA: No results found for: PSA        Assessment/ Plan:  Cystoscopy left ureteroscopy laser lithotripsy stent exchange    Angelica Hernadez MD

## 2022-02-23 ENCOUNTER — ANESTHESIA (OUTPATIENT)
Dept: PERIOP | Facility: HOSPITAL | Age: 42
End: 2022-02-23
Payer: COMMERCIAL

## 2022-02-23 ENCOUNTER — HOSPITAL ENCOUNTER (OUTPATIENT)
Facility: HOSPITAL | Age: 42
Setting detail: OUTPATIENT SURGERY
Discharge: HOME | End: 2022-02-23
Attending: SPECIALIST | Admitting: SPECIALIST
Payer: COMMERCIAL

## 2022-02-23 ENCOUNTER — HOSPITAL ENCOUNTER (OUTPATIENT)
Dept: RADIOLOGY | Facility: HOSPITAL | Age: 42
Setting detail: OUTPATIENT SURGERY
Discharge: HOME | End: 2022-02-23
Payer: COMMERCIAL

## 2022-02-23 VITALS
SYSTOLIC BLOOD PRESSURE: 129 MMHG | WEIGHT: 315 LBS | HEART RATE: 103 BPM | HEIGHT: 71 IN | BODY MASS INDEX: 44.1 KG/M2 | DIASTOLIC BLOOD PRESSURE: 61 MMHG | OXYGEN SATURATION: 92 % | TEMPERATURE: 98 F | RESPIRATION RATE: 23 BRPM

## 2022-02-23 DIAGNOSIS — N35.919 STRICTURE OF MALE URETHRA, UNSPECIFIED STRICTURE TYPE: ICD-10-CM

## 2022-02-23 DIAGNOSIS — N20.0 CALCULUS OF KIDNEY: ICD-10-CM

## 2022-02-23 PROCEDURE — 74450 X-RAY URETHRA/BLADDER: CPT

## 2022-02-23 PROCEDURE — 82360 CALCULUS ASSAY QUANT: CPT | Performed by: SPECIALIST

## 2022-02-23 PROCEDURE — C1758 CATHETER, URETERAL: HCPCS | Performed by: SPECIALIST

## 2022-02-23 PROCEDURE — C1769 GUIDE WIRE: HCPCS | Performed by: SPECIALIST

## 2022-02-23 PROCEDURE — C2617 STENT, NON-COR, TEM W/O DEL: HCPCS | Performed by: SPECIALIST

## 2022-02-23 DEVICE — URETERAL STENT
Type: IMPLANTABLE DEVICE | Site: URINARY BLADDER | Status: FUNCTIONAL
Brand: PERCUFLEX™ PLUS

## 2022-02-23 RX ORDER — DEXAMETHASONE SODIUM PHOSPHATE 4 MG/ML
INJECTION, SOLUTION INTRA-ARTICULAR; INTRALESIONAL; INTRAMUSCULAR; INTRAVENOUS; SOFT TISSUE AS NEEDED
Status: DISCONTINUED | OUTPATIENT
Start: 2022-02-23 | End: 2022-02-23

## 2022-02-23 RX ORDER — SODIUM CHLORIDE, SODIUM LACTATE, POTASSIUM CHLORIDE, CALCIUM CHLORIDE 600; 310; 30; 20 MG/100ML; MG/100ML; MG/100ML; MG/100ML
50 INJECTION, SOLUTION INTRAVENOUS CONTINUOUS
Status: DISCONTINUED | OUTPATIENT
Start: 2022-02-23 | End: 2022-02-23 | Stop reason: HOSPADM

## 2022-02-23 RX ORDER — FENTANYL CITRATE 50 UG/ML
INJECTION, SOLUTION INTRAMUSCULAR; INTRAVENOUS AS NEEDED
Status: DISCONTINUED | OUTPATIENT
Start: 2022-02-23 | End: 2022-02-23

## 2022-02-23 RX ORDER — ONDANSETRON 2 MG/ML
4 INJECTION INTRAMUSCULAR; INTRAVENOUS ONCE AS NEEDED
Status: DISCONTINUED | OUTPATIENT
Start: 2022-02-23 | End: 2022-02-23 | Stop reason: HOSPADM

## 2022-02-23 RX ORDER — METOCLOPRAMIDE HYDROCHLORIDE 5 MG/ML
INJECTION INTRAMUSCULAR; INTRAVENOUS AS NEEDED
Status: DISCONTINUED | OUTPATIENT
Start: 2022-02-23 | End: 2022-02-23

## 2022-02-23 RX ORDER — MAGNESIUM HYDROXIDE 1200 MG/15ML
LIQUID ORAL AS NEEDED
Status: DISCONTINUED | OUTPATIENT
Start: 2022-02-23 | End: 2022-02-23 | Stop reason: HOSPADM

## 2022-02-23 RX ORDER — FENTANYL CITRATE/PF 50 MCG/ML
50 SYRINGE (ML) INJECTION
Status: DISCONTINUED | OUTPATIENT
Start: 2022-02-23 | End: 2022-02-23 | Stop reason: HOSPADM

## 2022-02-23 RX ORDER — SUCCINYLCHOLINE/SOD CL,ISO/PF 100 MG/5ML
SYRINGE (ML) INTRAVENOUS AS NEEDED
Status: DISCONTINUED | OUTPATIENT
Start: 2022-02-23 | End: 2022-02-23

## 2022-02-23 RX ORDER — MIDAZOLAM HYDROCHLORIDE 2 MG/2ML
INJECTION, SOLUTION INTRAMUSCULAR; INTRAVENOUS AS NEEDED
Status: DISCONTINUED | OUTPATIENT
Start: 2022-02-23 | End: 2022-02-23

## 2022-02-23 RX ORDER — HYDROMORPHONE HCL/PF 1 MG/ML
0.5 SYRINGE (ML) INJECTION
Status: DISCONTINUED | OUTPATIENT
Start: 2022-02-23 | End: 2022-02-23 | Stop reason: HOSPADM

## 2022-02-23 RX ORDER — GLYCOPYRROLATE 0.2 MG/ML
INJECTION INTRAMUSCULAR; INTRAVENOUS AS NEEDED
Status: DISCONTINUED | OUTPATIENT
Start: 2022-02-23 | End: 2022-02-23

## 2022-02-23 RX ORDER — PROPOFOL 10 MG/ML
INJECTION, EMULSION INTRAVENOUS AS NEEDED
Status: DISCONTINUED | OUTPATIENT
Start: 2022-02-23 | End: 2022-02-23

## 2022-02-23 RX ORDER — KETAMINE HCL IN NACL, ISO-OSM 100MG/10ML
SYRINGE (ML) INJECTION AS NEEDED
Status: DISCONTINUED | OUTPATIENT
Start: 2022-02-23 | End: 2022-02-23

## 2022-02-23 RX ORDER — SODIUM CHLORIDE, SODIUM LACTATE, POTASSIUM CHLORIDE, CALCIUM CHLORIDE 600; 310; 30; 20 MG/100ML; MG/100ML; MG/100ML; MG/100ML
125 INJECTION, SOLUTION INTRAVENOUS CONTINUOUS
Status: DISCONTINUED | OUTPATIENT
Start: 2022-02-23 | End: 2022-02-23 | Stop reason: HOSPADM

## 2022-02-23 RX ORDER — ALBUTEROL SULFATE 2.5 MG/3ML
2.5 SOLUTION RESPIRATORY (INHALATION) ONCE AS NEEDED
Status: DISCONTINUED | OUTPATIENT
Start: 2022-02-23 | End: 2022-02-23 | Stop reason: HOSPADM

## 2022-02-23 RX ORDER — METOCLOPRAMIDE HYDROCHLORIDE 5 MG/ML
10 INJECTION INTRAMUSCULAR; INTRAVENOUS ONCE AS NEEDED
Status: DISCONTINUED | OUTPATIENT
Start: 2022-02-23 | End: 2022-02-23 | Stop reason: HOSPADM

## 2022-02-23 RX ORDER — ONDANSETRON 2 MG/ML
INJECTION INTRAMUSCULAR; INTRAVENOUS AS NEEDED
Status: DISCONTINUED | OUTPATIENT
Start: 2022-02-23 | End: 2022-02-23

## 2022-02-23 RX ORDER — LEVOFLOXACIN 5 MG/ML
500 INJECTION, SOLUTION INTRAVENOUS ONCE
Status: COMPLETED | OUTPATIENT
Start: 2022-02-23 | End: 2022-02-23

## 2022-02-23 RX ADMIN — PROPOFOL 350 MG: 10 INJECTION, EMULSION INTRAVENOUS at 08:44

## 2022-02-23 RX ADMIN — METOCLOPRAMIDE HYDROCHLORIDE 10 MG: 5 INJECTION INTRAMUSCULAR; INTRAVENOUS at 08:32

## 2022-02-23 RX ADMIN — ONDANSETRON 4 MG: 2 INJECTION INTRAMUSCULAR; INTRAVENOUS at 09:05

## 2022-02-23 RX ADMIN — SODIUM CHLORIDE, SODIUM LACTATE, POTASSIUM CHLORIDE, AND CALCIUM CHLORIDE: .6; .31; .03; .02 INJECTION, SOLUTION INTRAVENOUS at 08:30

## 2022-02-23 RX ADMIN — Medication 30 MG: at 09:05

## 2022-02-23 RX ADMIN — MIDAZOLAM HYDROCHLORIDE 2 MG: 1 INJECTION, SOLUTION INTRAMUSCULAR; INTRAVENOUS at 08:32

## 2022-02-23 RX ADMIN — LEVOFLOXACIN: 5 INJECTION, SOLUTION INTRAVENOUS at 08:35

## 2022-02-23 RX ADMIN — Medication 20 MG: at 08:46

## 2022-02-23 RX ADMIN — Medication 160 MG: at 08:47

## 2022-02-23 RX ADMIN — FENTANYL CITRATE 100 MCG: 50 INJECTION, SOLUTION INTRAMUSCULAR; INTRAVENOUS at 08:45

## 2022-02-23 RX ADMIN — GLYCOPYRROLATE 0.1 MG: 0.2 INJECTION, SOLUTION INTRAMUSCULAR; INTRAVENOUS at 08:32

## 2022-02-23 RX ADMIN — LIDOCAINE HYDROCHLORIDE 100 MG: 20 INJECTION, SOLUTION INTRAVENOUS at 08:40

## 2022-02-23 RX ADMIN — DEXAMETHASONE SODIUM PHOSPHATE 4 MG: 4 INJECTION, SOLUTION INTRAMUSCULAR; INTRAVENOUS at 09:05

## 2022-02-23 NOTE — ANESTHESIA PREPROCEDURE EVALUATION
Procedure:  CYSTOSCOPY, URETEROSCOPY, LASER LITHOTRIPSY, BASKET EXTRACTION, STENT PLACEMENT (Left Bladder)    Relevant Problems   CARDIO   (+) HTN (hypertension)      GI/HEPATIC   (+) Gastroesophageal reflux disease      /RENAL   (+) ADILSON (acute kidney injury) (HCC)   (+) Renal calculus      NEURO/PSYCH   (+) Anxiety        Physical Exam    Airway    Mallampati score: IV  TM Distance: >3 FB  Neck ROM: full     Dental   No notable dental hx     Cardiovascular  Rhythm: regular, Rate: normal, No murmur, Cardiovascular exam normal    Pulmonary  Pulmonary exam normal Breath sounds clear to auscultation, No wheezes,     Other Findings        Anesthesia Plan  ASA Score- 4     Anesthesia Type- general with ASA Monitors  Additional Monitors:   Airway Plan: ETT  Plan Factors-Exercise tolerance (METS): <4 METS  Chart reviewed  EKG reviewed  Existing labs reviewed  Patient is not a current smoker  Patient instructed to abstain from smoking on day of procedure  Patient did not smoke on day of surgery  There is medical exclusion for perioperative obstructive sleep apnea risk education  Induction- intravenous and rapid sequence induction  Postoperative Plan- Plan for postoperative opioid use  Planned trial extubation    Informed Consent- Anesthetic plan and risks discussed with patient  I personally reviewed this patient with the CRNA  Discussed and agreed on the Anesthesia Plan with the CRNA  Mireya Zamora

## 2022-02-23 NOTE — OP NOTE
OPERATIVE REPORT  PATIENT NAME: Mariela Stewart    :  1980  MRN: [id number]  Pt Location: [facility] OR ROOM 01    SURGERY DATE: 2022    Surgeon(s) and Role:     * Kunal Camarillo MD - Primary    Preop Diagnosis:  Calculus of kidney [N20 0]    Post-Op Diagnosis Codes:     * Calculus of kidney [N20 0]    Procedure(s) (LRB):  CYSTOSCOPY, URETEROSCOPY, BASKET EXTRACTION, STENT EXCHANGE LEFT URETER (Left)    Specimen(s):  ID Type Source Tests Collected by Time Destination   B : LEFT RENAL PELVIS CALCULI Calculus Ureter, Left STONE ANALYSIS Kunal Camarillo MD 2022 [id number]        Estimated Blood Loss:   Minimal    Drains:  Ureteral Drain/Stent Left ureter (Active)   Number of days: 40       Anesthesia Type:   IV Sedation with Anesthesia    Operative Indications:  Calculus of kidney [N20 0]  This 78-year-old Gil obese male presents with left stent complicated UTI left 8 mm stone now to undergo ureteroscopy stone extraction possible laser    Operative Findings:  8 mm stone left lower pole removed with basket multiple small 2-3 mm stones removed with basket 26 Swazi stent exchanged patient to be seen in the office in 2 weeks for cysto stent removal    Complications:   None    Procedure and Technique:  After patient identified the holding area left side marked consent signed placed op suite  After anesthesia induced placed thigh position draped prep standard fashion  Time-out performed  A 22 Swazi cystoscope with 30 lens was placed through the urethra into the bladder  Anterior showed mild recurrent bulbar strictures which were easily passed posterior urethra confirmed 2 5 cm prostatic urethra  2 038 wire was passed up the left orifice left renal pelvis stent removed    45 cm ureteral sheath placed over the 1st wire 2nd left a safety urine urea scope was passed small stones noted in the upper lower pole as well as the 8 mm stone with the basket the stones were removed laser was not available the 8 mm stone was then removed without difficulty along with the sheath over the safety wire 24 cm 6 Macanese stent was passed the wire removed scope removed postop procedure awakened taken recovery room stable condition   I was present for the entire procedure    Patient Disposition:  PACU       SIGNATURE: Alvarez Kraft MD  DATE: February 23, 2022  TIME: 9:56 AM

## 2022-02-23 NOTE — DISCHARGE INSTRUCTIONS
Cystoscopy   WHAT YOU NEED TO KNOW:   A cystoscopy is a procedure to look inside your urethra and bladder using a cystoscope  The procedure is used to diagnose and treat conditions of the bladder, urethra, or prostate  DISCHARGE INSTRUCTIONS:   Seek care immediately if:   · Your urine turns from pink to red, or you have clots in your urine  · You cannot urinate and your bladder feels full  · You have severe pain  Contact your healthcare provider or urologist if:   · Your pain or burning during urination becomes worse or lasts longer than 1 day  · Your urine stays pink for longer than 1 day  · You have a fever and chills  · You urinate less than usual, or still feel like you have to urinate after you use the bathroom  · You have questions or concerns about your condition or care  Medicines: You may  be given any of the following:  · Antibiotics  help treat or prevent a bacterial infection  · Acetaminophen  decreases pain and fever  It is available without a doctor's order  Ask how much to take and how often to take it  Follow directions  Read the labels of all other medicines you are using to see if they also contain acetaminophen, or ask your doctor or pharmacist  Acetaminophen can cause liver damage if not taken correctly  Do not use more than 4 grams (4,000 milligrams) total of acetaminophen in one day  · Take your medicine as directed  Contact your healthcare provider if you think your medicine is not helping or if you have side effects  Tell him or her if you are allergic to any medicine  Keep a list of the medicines, vitamins, and herbs you take  Include the amounts, and when and why you take them  Bring the list or the pill bottles to follow-up visits  Carry your medicine list with you in case of an emergency  Follow up with your healthcare provider as directed:  Write down your questions so you remember to ask them during your visits    Self-care:   · Drink liquids as directed  Your healthcare provider may recommend that you drink 6 to 8 eight-ounce cups of water every day for 2 days after your procedure  · Apply a warm, damp washcloth over your urethral opening  This may help to relieve discomfort  · Ask when you can return to regular daily activities  Your healthcare provider may recommend that you rest after your procedure  · Do not have sex  until your healthcare provider tells you it is okay  Sex may increase your risk for a urinary tract infection  © Copyright [employer] 2021 Information is for End User's use only and may not be sold, redistributed or otherwise used for commercial purposes  All illustrations and images included in CareNotes® are the copyrighted property of A D A M , Inc  or [facility] Pepe Andrade   The above information is an  only  It is not intended as medical advice for individual conditions or treatments  Talk to your doctor, nurse or pharmacist before following any medical regimen to see if it is safe and effective for you  Ureteroscopy   WHAT YOU NEED TO KNOW:   A ureteroscopy is a procedure to examine in the inside of your urinary tract, which includes your urethra, bladder, ureters, and kidneys  A ureteroscope is a small, thin tube with a light and camera on the end  Ureteroscopy can help your healthcare provider diagnose and treat problems in your urinary tract, such as kidney stones  DISCHARGE INSTRUCTIONS:   Medicine:   · Antibiotics  may be given to treat or prevent an infection  · Take your medicine as directed  Contact your healthcare provider if you think your medicine is not helping or if you have side effects  Tell him or her if you are allergic to any medicine  Keep a list of the medicines, vitamins, and herbs you take  Include the amounts, and when and why you take them  Bring the list or the pill bottles to follow-up visits  Carry your medicine list with you in case of an emergency      Follow up with your healthcare provider as directed:  Write down your questions so you remember to ask them during your visits  Drink liquids as directed  Liquids can help prevent kidney stones and urinary tract infections  Drink water and limit the amount of caffeine you drink  Caffeine may be found in coffee, tea, soda, sports drinks, and foods  Ask your healthcare provider how much liquid to drink each day  Contact your healthcare provider if:   · You have a fever  · You cannot urinate  · You have blood in your urine  · You are vomiting  · You have pain in your abdomen or side  · You have questions or concerns about your condition or care  © Copyright [employer] 2018 Information is for End User's use only and may not be sold, redistributed or otherwise used for commercial purposes  All illustrations and images included in CareNotes® are the copyrighted property of A D A M , Inc  or Delfino Andrade   The above information is an  only  It is not intended as medical advice for individual conditions or treatments  Talk to your doctor, nurse or pharmacist before following any medical regimen to see if it is safe and effective for you  Ureteral Stent Placement   WHAT YOU NEED TO KNOW:   Ureteral stent placement is a procedure to open a blocked or narrow ureter  The ureter is the tube that carries urine from your kidney into your bladder  A stent is a thin hollow plastic tube used to hold your ureter open and allow urine to flow  The stent may stay in for several weeks  DISCHARGE INSTRUCTIONS:   Medicines:   · Pain medicine  may be given to take away or decrease pain  Do not wait until the pain is severe before you take your medicine  · Antibiotics  help prevent infections  Your healthcare provider may prescribe these for you while your stent remains in  · Take your medicine as directed    Contact your healthcare provider if you think your medicine is not helping or if you have side effects  Tell him or her if you are allergic to any medicine  Keep a list of the medicines, vitamins, and herbs you take  Include the amounts, and when and why you take them  Bring the list or the pill bottles to follow-up visits  Carry your medicine list with you in case of an emergency  Follow up with your urologist as directed: You will need regular follow-up visits with your urologist as long as the stent remains in  He will check to make sure the stent is working properly  He may do urine cultures to check for infection  Write down your questions so you remember to ask them during your visits  Self-care:   · Drink liquids  as directed  Ask your healthcare provider how much liquid to drink each day and which liquids are best for you  Fluids such as cranberry or apple juice may be especially helpful to prevent urinary infections  · Return to normal activities  the day after your stent placement or as directed by your healthcare provider  · You may take a shower  the day after your stent placement if your healthcare provider says it is okay  Contact your healthcare provider or urologist if:   · You have a fever or chills  · You feel like you need to urinate often  · You have pain when you urinate or pain around your bladder or kidney  · You see blood in your urine or it looks cloudy  · You have questions or concerns about your condition or care  Seek care immediately or call 911 if:   · You urinate little or not at all  · You have severe pain in your abdomen  © 2017 [address] Information is for End User's use only and may not be sold, redistributed or otherwise used for commercial purposes  All illustrations and images included in CareNotes® are the copyrighted property of [employer]  or Andrey Roth  The above information is an  only  It is not intended as medical advice for individual conditions or treatments   Talk to your doctor, nurse or pharmacist before following any medical regimen to see if it is safe and effective for you

## 2022-02-23 NOTE — ANESTHESIA POSTPROCEDURE EVALUATION
Post-Op Assessment Note    CV Status:  Stable  Pain Score: 0    Pain management: adequate     Mental Status:  Awake   Hydration Status:  Stable   PONV Controlled:  Controlled   Airway Patency:  Patent      Post Op Vitals Reviewed: Yes      Staff: CRNA         No complications documented      BP   133/63   Temp   98 0   Pulse  98   Resp   15   SpO2   97

## 2022-02-23 NOTE — PROGRESS NOTES
Progress Note - Urology      Patient: Gosia Cooper   : 1980 Sex: male   MRN: [id number]     CSN: [id number]  Unit/Bed#: OR POOL     SUBJECTIVE:   Patient presenting late to the surgical preop unit  No temperatures recurrent infections urine checked a few weeks ago  Aware risk of anesthesia infection bleeding additional urologic procedures      Objective   Vitals: /66   Pulse 104   Temp 98 1 °F (36 7 °C) (Temporal)   Resp 18   Ht 5' 11" (1 803 m)   Wt (!) 191 kg (420 lb)   SpO2 98%   BMI 58 58 kg/m²     No intake/output data recorded        Physical Exam:   General Alert awake   Normocephalic atraumatic PERRLA  Lungs clear bilaterally  Cardiac normal S1 normal S2  Morbid obesity  Abdomen soft, flank pain  Extremities no edema      Lab Results: CBC:   Lab Results   Component Value Date    WBC 12 10 (H) 2021    HGB 11 6 (L) 2021    HCT 36 4 (L) 2021    MCV 85 2021     2021    MCH 27 0 2021    MCHC 31 9 2021    RDW 15 4 (H) 2021    MPV 10 1 2021    NRBC 0 2021     CMP:   Lab Results   Component Value Date     2017     2021     2017    CO2 30 2021    CO2 27 2017    BUN 19 2021    BUN 14 2017    CREATININE 0 81 2021    CREATININE 0 75 2017    CALCIUM 9 0 2021    CALCIUM 9 4 2017    AST 17 2021    AST 18 2017    ALT 18 2021    ALT 18 2017    ALKPHOS 43 7 2021    ALKPHOS 46 2017    PROT 7 7 2017    BILITOT 0 5 2017    EGFR 108 2021     Urinalysis:   Lab Results   Component Value Date    COLORU Yellow 2021    COLORU YELLOW 2016    CLARITYU Clear 2021    SPECGRAV 1 025 2021    SPECGRAV 1 025 2016    PHUR 5 5 2021    PHUR 6 0 2016    LEUKOCYTESUR Trace (A) 2021    LEUKOCYTESUR NEGATIVE 2016    NITRITE Negative 2021    NITRITE NEGATIVE 08/17/2016    PROTEINUA NEGATIVE 08/17/2016    GLUCOSEU Negative 12/28/2021    KETONESU Negative 12/28/2021    KETONESU NEGATIVE 08/17/2016    BILIRUBINUR Negative 12/28/2021    BILIRUBINUR NEGATIVE 08/17/2016    BLOODU 3+ (A) 12/28/2021     Urine Culture:   Lab Results   Component Value Date    URINECX No Growth <100 cfu/mL 01/14/2022     PSA: No results found for: PSA      Assessment/ Plan:  Xochitl Aelxander EXTRACTION STENT EXCHANGE          Burak Bell MD

## 2022-03-01 LAB
COLOR STONE: NORMAL
COM MFR STONE: 10 %
COMMENT-STONE3: NORMAL
COMPOSITION: NORMAL
LABORATORY COMMENT REPORT: NORMAL
PHOTO: NORMAL
SIZE STONE: NORMAL MM
SPEC SOURCE SUBJ: NORMAL
STONE ANALYSIS-IMP: NORMAL
URATE MFR STONE: 90 %
WT STONE: 123 MG

## 2022-06-09 ENCOUNTER — HOSPITAL ENCOUNTER (OUTPATIENT)
Dept: RADIOLOGY | Facility: MEDICAL CENTER | Age: 42
Discharge: HOME | End: 2022-06-09
Payer: COMMERCIAL

## 2022-06-09 ENCOUNTER — APPOINTMENT (OUTPATIENT)
Dept: LAB | Facility: MEDICAL CENTER | Age: 42
End: 2022-06-09
Payer: COMMERCIAL

## 2022-06-09 DIAGNOSIS — N35.919 STRICTURE OF MALE URETHRA, UNSPECIFIED STRICTURE TYPE: ICD-10-CM

## 2022-06-09 DIAGNOSIS — N20.0 CALCULUS, RENAL: ICD-10-CM

## 2022-06-09 PROCEDURE — 76770 US EXAM ABDO BACK WALL COMP: CPT

## 2022-06-11 ENCOUNTER — APPOINTMENT (OUTPATIENT)
Dept: LAB | Facility: MEDICAL CENTER | Age: 42
End: 2022-06-11
Payer: COMMERCIAL

## 2022-06-11 LAB
PERIOD: 24 HOURS
SPECIMEN VOL UR: 2720 ML
URATE 24H UR-MCNC: 916.64 MG/24 HRS (ref 150–990)

## 2022-06-11 PROCEDURE — 84560 ASSAY OF URINE/URIC ACID: CPT

## 2022-09-10 ENCOUNTER — APPOINTMENT (OUTPATIENT)
Dept: LAB | Facility: MEDICAL CENTER | Age: 42
End: 2022-09-10
Payer: COMMERCIAL

## 2022-09-10 ENCOUNTER — APPOINTMENT (OUTPATIENT)
Dept: RADIOLOGY | Facility: MEDICAL CENTER | Age: 42
End: 2022-09-10
Payer: COMMERCIAL

## 2022-09-10 DIAGNOSIS — E79.0 URICACIDEMIA: ICD-10-CM

## 2022-09-10 DIAGNOSIS — N20.0 RENAL CALCULUS: ICD-10-CM

## 2022-09-10 LAB — URATE SERPL-MCNC: 6.8 MG/DL (ref 3.5–8.5)

## 2022-09-10 PROCEDURE — 84550 ASSAY OF BLOOD/URIC ACID: CPT

## 2022-09-10 PROCEDURE — 74018 RADEX ABDOMEN 1 VIEW: CPT

## 2022-09-10 PROCEDURE — 36415 COLL VENOUS BLD VENIPUNCTURE: CPT

## 2022-10-12 PROBLEM — N39.0 UTI (URINARY TRACT INFECTION): Status: RESOLVED | Noted: 2021-11-24 | Resolved: 2022-10-12

## 2024-03-21 ENCOUNTER — HOSPITAL ENCOUNTER (OUTPATIENT)
Facility: HOSPITAL | Age: 44
Setting detail: OBSERVATION
Discharge: HOME | End: 2024-03-22
Attending: EMERGENCY MEDICINE | Admitting: INTERNAL MEDICINE
Payer: COMMERCIAL

## 2024-03-21 DIAGNOSIS — E83.42 HYPOMAGNESEMIA: ICD-10-CM

## 2024-03-21 DIAGNOSIS — E66.01 MORBID OBESITY (HCC): ICD-10-CM

## 2024-03-21 DIAGNOSIS — I48.91 ATRIAL FIBRILLATION WITH RVR (HCC): Primary | ICD-10-CM

## 2024-03-21 DIAGNOSIS — K59.00 CONSTIPATION, UNSPECIFIED CONSTIPATION TYPE: ICD-10-CM

## 2024-03-21 LAB
2HR DELTA HS TROPONIN: -1 NG/L
4HR DELTA HS TROPONIN: 0 NG/L
ALBUMIN SERPL BCP-MCNC: 4.2 G/DL (ref 3.5–5)
ALP SERPL-CCNC: 34 U/L (ref 34–104)
ALT SERPL W P-5'-P-CCNC: 13 U/L (ref 7–52)
ANION GAP SERPL CALCULATED.3IONS-SCNC: 7 MMOL/L (ref 4–13)
APTT PPP: 29 SECONDS (ref 23–37)
AST SERPL W P-5'-P-CCNC: 15 U/L (ref 13–39)
ATRIAL RATE: 94 BPM
BASOPHILS # BLD AUTO: 0.04 THOUSANDS/ÂΜL (ref 0–0.1)
BASOPHILS NFR BLD AUTO: 0 % (ref 0–1)
BILIRUB SERPL-MCNC: 0.41 MG/DL (ref 0.2–1)
BUN SERPL-MCNC: 15 MG/DL (ref 5–25)
CALCIUM SERPL-MCNC: 9.5 MG/DL (ref 8.4–10.2)
CARDIAC TROPONIN I PNL SERPL HS: 14 NG/L
CARDIAC TROPONIN I PNL SERPL HS: 15 NG/L
CARDIAC TROPONIN I PNL SERPL HS: 15 NG/L
CHLORIDE SERPL-SCNC: 103 MMOL/L (ref 96–108)
CO2 SERPL-SCNC: 29 MMOL/L (ref 21–32)
CREAT SERPL-MCNC: 0.69 MG/DL (ref 0.6–1.3)
EOSINOPHIL # BLD AUTO: 0.05 THOUSAND/ÂΜL (ref 0–0.61)
EOSINOPHIL NFR BLD AUTO: 1 % (ref 0–6)
ERYTHROCYTE [DISTWIDTH] IN BLOOD BY AUTOMATED COUNT: 14 % (ref 11.6–15.1)
GFR SERPL CREATININE-BSD FRML MDRD: 114 ML/MIN/1.73SQ M
GLUCOSE SERPL-MCNC: 98 MG/DL (ref 65–140)
HCT VFR BLD AUTO: 42.8 % (ref 36.5–49.3)
HGB BLD-MCNC: 13.8 G/DL (ref 12–17)
IMM GRANULOCYTES # BLD AUTO: 0.03 THOUSAND/UL (ref 0–0.2)
IMM GRANULOCYTES NFR BLD AUTO: 0 % (ref 0–2)
INR PPP: 1.05 (ref 0.84–1.19)
LYMPHOCYTES # BLD AUTO: 1.29 THOUSANDS/ÂΜL (ref 0.6–4.47)
LYMPHOCYTES NFR BLD AUTO: 13 % (ref 14–44)
MAGNESIUM SERPL-MCNC: 1.8 MG/DL (ref 1.9–2.7)
MCH RBC QN AUTO: 28 PG (ref 26.8–34.3)
MCHC RBC AUTO-ENTMCNC: 32.2 G/DL (ref 31.4–37.4)
MCV RBC AUTO: 87 FL (ref 82–98)
MONOCYTES # BLD AUTO: 0.68 THOUSAND/ÂΜL (ref 0.17–1.22)
MONOCYTES NFR BLD AUTO: 7 % (ref 4–12)
NEUTROPHILS # BLD AUTO: 7.83 THOUSANDS/ÂΜL (ref 1.85–7.62)
NEUTS SEG NFR BLD AUTO: 79 % (ref 43–75)
NRBC BLD AUTO-RTO: 0 /100 WBCS
PLATELET # BLD AUTO: 215 THOUSANDS/UL (ref 149–390)
PLATELET # BLD AUTO: 221 THOUSANDS/UL (ref 149–390)
PMV BLD AUTO: 10.1 FL (ref 8.9–12.7)
PMV BLD AUTO: 9.9 FL (ref 8.9–12.7)
POTASSIUM SERPL-SCNC: 3.8 MMOL/L (ref 3.5–5.3)
PROT SERPL-MCNC: 7.4 G/DL (ref 6.4–8.4)
PROTHROMBIN TIME: 14.3 SECONDS (ref 11.6–14.5)
QRS AXIS: 58 DEGREES
QRSD INTERVAL: 88 MS
QT INTERVAL: 296 MS
QTC INTERVAL: 411 MS
RBC # BLD AUTO: 4.93 MILLION/UL (ref 3.88–5.62)
SODIUM SERPL-SCNC: 139 MMOL/L (ref 135–147)
T WAVE AXIS: 13 DEGREES
TSH SERPL DL<=0.05 MIU/L-ACNC: 1.38 UIU/ML (ref 0.45–4.5)
VENTRICULAR RATE: 116 BPM
WBC # BLD AUTO: 9.92 THOUSAND/UL (ref 4.31–10.16)

## 2024-03-21 PROCEDURE — 99285 EMERGENCY DEPT VISIT HI MDM: CPT | Performed by: EMERGENCY MEDICINE

## 2024-03-21 PROCEDURE — 83735 ASSAY OF MAGNESIUM: CPT | Performed by: EMERGENCY MEDICINE

## 2024-03-21 PROCEDURE — 99285 EMERGENCY DEPT VISIT HI MDM: CPT

## 2024-03-21 PROCEDURE — 99245 OFF/OP CONSLTJ NEW/EST HI 55: CPT | Performed by: INTERNAL MEDICINE

## 2024-03-21 PROCEDURE — 99223 1ST HOSP IP/OBS HIGH 75: CPT | Performed by: INTERNAL MEDICINE

## 2024-03-21 PROCEDURE — 85610 PROTHROMBIN TIME: CPT | Performed by: EMERGENCY MEDICINE

## 2024-03-21 PROCEDURE — 85049 AUTOMATED PLATELET COUNT: CPT | Performed by: PHYSICIAN ASSISTANT

## 2024-03-21 PROCEDURE — 93010 ELECTROCARDIOGRAM REPORT: CPT | Performed by: INTERNAL MEDICINE

## 2024-03-21 PROCEDURE — 84443 ASSAY THYROID STIM HORMONE: CPT | Performed by: EMERGENCY MEDICINE

## 2024-03-21 PROCEDURE — 36415 COLL VENOUS BLD VENIPUNCTURE: CPT | Performed by: EMERGENCY MEDICINE

## 2024-03-21 PROCEDURE — 85025 COMPLETE CBC W/AUTO DIFF WBC: CPT | Performed by: EMERGENCY MEDICINE

## 2024-03-21 PROCEDURE — 84484 ASSAY OF TROPONIN QUANT: CPT | Performed by: PHYSICIAN ASSISTANT

## 2024-03-21 PROCEDURE — 85730 THROMBOPLASTIN TIME PARTIAL: CPT | Performed by: EMERGENCY MEDICINE

## 2024-03-21 PROCEDURE — 84484 ASSAY OF TROPONIN QUANT: CPT | Performed by: EMERGENCY MEDICINE

## 2024-03-21 PROCEDURE — 96365 THER/PROPH/DIAG IV INF INIT: CPT

## 2024-03-21 PROCEDURE — 96375 TX/PRO/DX INJ NEW DRUG ADDON: CPT

## 2024-03-21 PROCEDURE — 80053 COMPREHEN METABOLIC PANEL: CPT | Performed by: EMERGENCY MEDICINE

## 2024-03-21 PROCEDURE — 93005 ELECTROCARDIOGRAM TRACING: CPT

## 2024-03-21 RX ORDER — DOCUSATE SODIUM 100 MG/1
100 CAPSULE, LIQUID FILLED ORAL 2 TIMES DAILY PRN
Status: DISCONTINUED | OUTPATIENT
Start: 2024-03-21 | End: 2024-03-22 | Stop reason: HOSPADM

## 2024-03-21 RX ORDER — ENOXAPARIN SODIUM 100 MG/ML
40 INJECTION SUBCUTANEOUS 2 TIMES DAILY
Status: DISCONTINUED | OUTPATIENT
Start: 2024-03-21 | End: 2024-03-21

## 2024-03-21 RX ORDER — MAGNESIUM SULFATE HEPTAHYDRATE 40 MG/ML
2 INJECTION, SOLUTION INTRAVENOUS ONCE
Status: COMPLETED | OUTPATIENT
Start: 2024-03-21 | End: 2024-03-21

## 2024-03-21 RX ORDER — ENOXAPARIN SODIUM 100 MG/ML
60 INJECTION SUBCUTANEOUS 2 TIMES DAILY
Status: DISCONTINUED | OUTPATIENT
Start: 2024-03-21 | End: 2024-03-21

## 2024-03-21 RX ORDER — ACETAMINOPHEN 325 MG/1
650 TABLET ORAL EVERY 6 HOURS PRN
Status: DISCONTINUED | OUTPATIENT
Start: 2024-03-21 | End: 2024-03-22 | Stop reason: HOSPADM

## 2024-03-21 RX ORDER — DILTIAZEM HYDROCHLORIDE 5 MG/ML
15 INJECTION INTRAVENOUS ONCE
Status: COMPLETED | OUTPATIENT
Start: 2024-03-21 | End: 2024-03-21

## 2024-03-21 RX ORDER — PANTOPRAZOLE SODIUM 40 MG/1
40 TABLET, DELAYED RELEASE ORAL
Status: DISCONTINUED | OUTPATIENT
Start: 2024-03-22 | End: 2024-03-22 | Stop reason: HOSPADM

## 2024-03-21 RX ADMIN — DILTIAZEM HYDROCHLORIDE 5 MG/HR: 5 INJECTION INTRAVENOUS at 11:17

## 2024-03-21 RX ADMIN — MAGNESIUM SULFATE HEPTAHYDRATE 2 G: 40 INJECTION, SOLUTION INTRAVENOUS at 12:07

## 2024-03-21 RX ADMIN — SODIUM CHLORIDE 1000 ML: 0.9 INJECTION, SOLUTION INTRAVENOUS at 11:16

## 2024-03-21 RX ADMIN — APIXABAN 5 MG: 5 TABLET, FILM COATED ORAL at 22:05

## 2024-03-21 RX ADMIN — METOPROLOL TARTRATE 25 MG: 25 TABLET, FILM COATED ORAL at 22:43

## 2024-03-21 RX ADMIN — DILTIAZEM HYDROCHLORIDE 15 MG: 5 INJECTION, SOLUTION INTRAVENOUS at 12:12

## 2024-03-21 RX ADMIN — ENOXAPARIN SODIUM 60 MG: 60 INJECTION SUBCUTANEOUS at 13:51

## 2024-03-21 RX ADMIN — METOPROLOL TARTRATE 25 MG: 25 TABLET, FILM COATED ORAL at 15:14

## 2024-03-21 NOTE — H&P
[facility]  H&P  Name: Maurilio Connors 43 y.o. male I MRN: [id number]  Unit/Bed#: S -01 I Date of Admission: 3/21/2024   Date of Service: 3/21/2024 I Hospital Day: 1      Assessment/Plan   * New onset a-fib with RVR  Assessment & Plan  Patient reported new palpitations in the middle of the night.  He was found to be in rapid A-fib with heart rates initially in the 160-180 range  No clear exacerbating factors such as thyroid disease, underlying heart disease, recent infection or alcohol use  Currently with rates having improved into the lower 100 range on a Cardizem drip which will be continued cautiously to avoid hypotension  Check echocardiogram   consult cardiology  [id number] score 1, therefore does not require anticoagulation  Continue telemetry  Recommend outpatient sleep study to assess for sleep apnea    HTN (hypertension)  Assessment & Plan  Hold lisinopril to avoid hypotension while attempting to achieve rate control    Hypomagnesemia  Assessment & Plan  Mag 1.8 on admission  replete and recheck in am to keep mag >2    Morbid obesity (HCC)  Assessment & Plan  BMI >54--recommend weight loss         VTE Pharmacologic Prophylaxis: VTE Score: 4 lovenox with dosing for obesity  Code Status: Level 1 - Full Code   Discussion with family: Updated  (brother) via phone.    Anticipated Length of Stay: Patient will be admitted on an observation basis with an anticipated length of stay of less than 2 midnights secondary to new afib.    Total Time Spent on Date of Encounter in care of patient: 55 mins. This time was spent on one or more of the following: performing physical exam; counseling and coordination of care; obtaining or reviewing history; documenting in the medical record; reviewing/ordering tests, medications or procedures; communicating with other healthcare professionals and discussing with patient's family/caregivers.    Chief Complaint:  Palpitations    History of Present Illness:  Maurilio Connors is a 43 y.o. male with a PMH of morbid obesity and hypertension who presents with palpitations in the middle of the night.  He is feeling a little better now.  Patient reports that occasionally he has feelings of skipped heartbeats in the past but this was different.  He has chronic sense of dyspnea with ambulating due to his obesity but no change in symptoms recently and no chest pain or pressure.  Nor any dizziness or lightheadedness.  He denies any alcohol use, nor does he drink caffeine.  He has no prior underlying history of heart disease or thyroid disease.    Review of Systems:  Review of Systems   Constitutional:  Negative for activity change, appetite change, chills, diaphoresis, fatigue, fever and unexpected weight change.   HENT:  Positive for sore throat (mild sore throat yesterday). Negative for congestion, rhinorrhea and trouble swallowing.    Eyes:  Negative for visual disturbance.   Respiratory:  Negative for cough, choking, chest tightness, wheezing and stridor.    Cardiovascular:  Positive for palpitations. Negative for chest pain and leg swelling.   Gastrointestinal:  Negative for abdominal distention, abdominal pain, anal bleeding, constipation, diarrhea, nausea and vomiting.   Genitourinary:  Negative for decreased urine volume, difficulty urinating, dysuria and urgency.   Musculoskeletal:  Positive for neck stiffness. Negative for arthralgias, back pain, gait problem and myalgias.   Skin:  Negative for color change, pallor, rash and wound.   Neurological:  Negative for dizziness, tremors, seizures, syncope, speech difficulty, weakness, light-headedness, numbness and headaches.   Psychiatric/Behavioral:  Negative for agitation.        Past Medical and Surgical History:   Past Medical History:   Diagnosis Date    ADILSON (acute kidney injury) (HCC)     Anxiety     GERD (gastroesophageal reflux disease)     Hypertension     Kidney stone      Murmur, cardiac     Stress incontinence     Urethral stricture     Use of cane as ambulatory aid     as needed    Wears glasses        Past Surgical History:   Procedure Laterality Date    CHOLECYSTECTOMY      2004    CYSTOSCOPY N/A 10/19/2021    Procedure: CYSTOSCOPY;  Surgeon: Pilo Horton MD;  Location: [facility];  Service: Urology    WI CYSTO BLADDER W/URETERAL CATHETERIZATION Left 12/29/2021    Procedure: CYSTOSCOPY RETROGRADE PYELOGRAM WITH INSERTION STENT URETERAL LEFT, DVIU;  Surgeon: Ayush Holland MD;  Location: [facility];  Service: Urology    WI CYSTO/URETERO W/LITHOTRIPSY &INDWELL STENT INSRT Left 1/14/2022    Procedure: CYSTOSCOPY, left URETEROSCOPY, removal of migrated stent, insertion new left ureteral stent placement ;  Surgeon: Ayush Holland MD;  Location: [facility];  Service: Urology    WI CYSTO/URETERO W/LITHOTRIPSY &INDWELL STENT INSRT Left 2/23/2022    Procedure: CYSTOSCOPY, URETEROSCOPY, BASKET EXTRACTION, STENT EXCHANGE LEFT URETER;  Surgeon: Ayush Holland MD;  Location: [facility];  Service: Urology    WI CYSTOURETHROSCOPY W/INTERNAL URETHROTOMY N/A 10/19/2021    Procedure: DIRECT VISUAL INTERAL URETHROTOMY (DVIU);  Surgeon: Pilo Horton MD;  Location: [facility];  Service: Urology       Meds/Allergies:  Prior to Admission medications    Medication Sig Start Date End Date Taking? Authorizing Provider   acetaminophen (TYLENOL) 325 mg tablet Take 2 tablets (650 mg total) by mouth every 6 (six) hours as needed for mild pain 12/31/21   JANINE Dennison   docusate sodium (COLACE) 100 mg capsule Take 1 capsule (100 mg total) by mouth daily  Patient taking differently: Take 100 mg by mouth as needed   1/1/22   JANINE Dennison   lisinopril (ZESTRIL) 40 mg tablet Take 1 tablet (40 mg total) by mouth daily 3/8/18   JANINE Resendiz   omeprazole (PriLOSEC) 20 mg delayed release capsule Take 1 capsule (20 mg total) by mouth daily 5/31/18   Kelly Christian, DO     I have  "reviewed home medications with patient personally.    Allergies: No Known Allergies    Social History:  Marital Status: Single   Occupation: unemployed  Patient Pre-hospital Living Situation: Home  Patient Pre-hospital Level of Mobility: walks  Patient Pre-hospital Diet Restrictions:   Substance Use History:   Social History     Substance and Sexual Activity   Alcohol Use Not Currently     Social History     Tobacco Use   Smoking Status Former    Types: Cigarettes   Smokeless Tobacco Never     Social History     Substance and Sexual Activity   Drug Use Never       Family History:  DM2    Physical Exam:     Vitals:   Blood Pressure: 106/69 (03/21/24 1247)  Pulse: 104 (03/21/24 1247)  Temperature: 98.2 °F (36.8 °C) (03/21/24 1046)  Temp Source: Oral (03/21/24 1046)  Respirations: 18 (03/21/24 1247)  Height: 5' 11\" (180.3 cm) (03/21/24 1300)  Weight - Scale: (!) 177 kg (390 lb 3.4 oz) (03/21/24 1046)  SpO2: 95 % (03/21/24 1247)    Physical Exam  Vitals reviewed.   Constitutional:       General: He is not in acute distress.     Appearance: He is obese. He is not ill-appearing, toxic-appearing or diaphoretic.   HENT:      Head: Normocephalic.      Nose: No congestion.      Mouth/Throat:      Mouth: Mucous membranes are dry.      Pharynx: Oropharynx is clear. No oropharyngeal exudate or posterior oropharyngeal erythema.   Eyes:      General: No scleral icterus.        Right eye: No discharge.         Left eye: No discharge.      Conjunctiva/sclera: Conjunctivae normal.   Neck:      Comments: Neck stiffness  Cardiovascular:      Rate and Rhythm: Normal rate and regular rhythm.      Heart sounds: No murmur heard.  Pulmonary:      Effort: No respiratory distress.      Breath sounds: Normal breath sounds. No stridor. No wheezing, rhonchi or rales.   Abdominal:      General: There is no distension.      Tenderness: There is no abdominal tenderness. There is no guarding.   Musculoskeletal:         General: No swelling, " tenderness, deformity or signs of injury.      Right lower leg: No edema.      Left lower leg: No edema.   Skin:     General: Skin is warm and dry.      Coloration: Skin is not jaundiced or pale.      Findings: No bruising, erythema, lesion or rash.   Neurological:      General: No focal deficit present.      Mental Status: He is alert. Mental status is at baseline.      Comments: Awake alert interactive no confusion   Psychiatric:         Mood and Affect: Mood normal.         Thought Content: Thought content normal.          Additional Data:     Lab Results:  Results from last 7 days   Lab Units 03/21/24  1105   WBC Thousand/uL 9.92   HEMOGLOBIN g/dL 13.8   HEMATOCRIT % 42.8   PLATELETS Thousands/uL 215   NEUTROS PCT % 79*   LYMPHS PCT % 13*   MONOS PCT % 7   EOS PCT % 1     Results from last 7 days   Lab Units 03/21/24  1116   SODIUM mmol/L 139   POTASSIUM mmol/L 3.8   CHLORIDE mmol/L 103   CO2 mmol/L 29   BUN mg/dL 15   CREATININE mg/dL 0.69   ANION GAP mmol/L 7   CALCIUM mg/dL 9.5   ALBUMIN g/dL 4.2   TOTAL BILIRUBIN mg/dL 0.41   ALK PHOS U/L 34   ALT U/L 13   AST U/L 15   GLUCOSE RANDOM mg/dL 98     Results from last 7 days   Lab Units 03/21/24  1105   INR  1.05                   Lines/Drains:  Invasive Devices       Peripheral Intravenous Line  Duration             Peripheral IV 03/21/24 Distal;Right;Upper;Ventral (anterior) Arm <1 day              Drain  Duration             Ureteral Drain/Stent Left ureter 797 days                        Imaging:   No orders to display       EKG and Other Studies Reviewed on Admission:   EKG: rapid afib with 1 PVC    ** Please Note: This note has been constructed using a voice recognition system. **

## 2024-03-21 NOTE — ASSESSMENT & PLAN NOTE
Patient reported new palpitations in the middle of the night.  He was found to be in rapid A-fib with heart rates initially in the 160-180 range  No clear exacerbating factors such as thyroid disease, underlying heart disease, recent infection or alcohol use  Currently with rates having improved into the lower 100 range on a Cardizem drip which will be continued cautiously to avoid hypotension  Check echocardiogram   consult cardiology  [id number] score 1, therefore does not require anticoagulation  Continue telemetry  Recommend outpatient sleep study to assess for sleep apnea

## 2024-03-21 NOTE — ED PROVIDER NOTES
History  Chief Complaint   Patient presents with    Palpitations     Pt arrives via EMS. Pt reports waking up at 4 am with palpitations, took a tylenol without relief. Pt denies CP/SOB. EMS reports administering 20 mg Cardizem in ambulance after seeing HR in 160-180's.     43-year-old male with a history of hypertension, GERD, ADILSON, nephrolithiasis presenting for evaluation of palpitations.  Patient states he woke up from sleep around 4 AM with a racing heartbeat.  Denies previous history of similar symptoms or history of dysrhythmia.  Denies associated chest pain, shortness of breath, nausea, vomiting, abdominal pain, diarrhea, leg swelling, fever, chills, upper respiratory symptoms.  Per EMS, heart rates were in the 160s to 180s and irregular; he received diltiazem prior to arrival with some improvement in heart rate.      Palpitations  Associated symptoms: no chest pain, no cough, no dizziness, no nausea, no numbness, no shortness of breath, no vomiting and no weakness        Prior to Admission Medications   Prescriptions Last Dose Informant Patient Reported? Taking?   acetaminophen (TYLENOL) 325 mg tablet   No No   Sig: Take 2 tablets (650 mg total) by mouth every 6 (six) hours as needed for mild pain   docusate sodium (COLACE) 100 mg capsule   No No   Sig: Take 1 capsule (100 mg total) by mouth daily   Patient taking differently: Take 100 mg by mouth as needed     lisinopril (ZESTRIL) 40 mg tablet  Self No No   Sig: Take 1 tablet (40 mg total) by mouth daily   omeprazole (PriLOSEC) 20 mg delayed release capsule  Self No No   Sig: Take 1 capsule (20 mg total) by mouth daily      Facility-Administered Medications: None       Past Medical History:   Diagnosis Date    ADILSON (acute kidney injury) (HCC)     Anxiety     GERD (gastroesophageal reflux disease)     Hypertension     Kidney stone     Murmur, cardiac     Stress incontinence     Urethral stricture     Use of cane as ambulatory aid     as needed    Wears glasses         Past Surgical History:   Procedure Laterality Date    CHOLECYSTECTOMY      2004    CYSTOSCOPY N/A 10/19/2021    Procedure: CYSTOSCOPY;  Surgeon: Pilo Horton MD;  Location: [facility];  Service: Urology    ND CYSTO BLADDER W/URETERAL CATHETERIZATION Left 12/29/2021    Procedure: CYSTOSCOPY RETROGRADE PYELOGRAM WITH INSERTION STENT URETERAL LEFT, DVIU;  Surgeon: Ayush Holland MD;  Location: [facility];  Service: Urology    ND CYSTO/URETERO W/LITHOTRIPSY &INDWELL STENT INSRT Left 1/14/2022    Procedure: CYSTOSCOPY, left URETEROSCOPY, removal of migrated stent, insertion new left ureteral stent placement ;  Surgeon: Ayush Holland MD;  Location: [facility];  Service: Urology    ND CYSTO/URETERO W/LITHOTRIPSY &INDWELL STENT INSRT Left 2/23/2022    Procedure: CYSTOSCOPY, URETEROSCOPY, BASKET EXTRACTION, STENT EXCHANGE LEFT URETER;  Surgeon: Ayush Holland MD;  Location: [facility];  Service: Urology    ND CYSTOURETHROSCOPY W/INTERNAL URETHROTOMY N/A 10/19/2021    Procedure: DIRECT VISUAL INTERAL URETHROTOMY (DVIU);  Surgeon: Pilo Horton MD;  Location: [facility];  Service: Urology       History reviewed. No pertinent family history.  I have reviewed and agree with the history as documented.    E-Cigarette/Vaping    E-Cigarette Use Never User      E-Cigarette/Vaping Substances    Nicotine No     THC No     CBD No     Flavoring No      Social History     Tobacco Use    Smoking status: Former     Types: Cigarettes    Smokeless tobacco: Never   Vaping Use    Vaping status: Never Used   Substance Use Topics    Alcohol use: Not Currently    Drug use: Never       Review of Systems   Constitutional:  Negative for chills and fever.   HENT:  Negative for congestion, rhinorrhea and sore throat.    Respiratory:  Negative for cough and shortness of breath.    Cardiovascular:  Positive for palpitations. Negative for chest pain and leg swelling.   Gastrointestinal:  Negative for abdominal pain, diarrhea,  nausea and vomiting.   Genitourinary:  Negative for dysuria, frequency and hematuria.   Musculoskeletal:  Negative for arthralgias and myalgias.   Skin:  Negative for color change and rash.   Neurological:  Negative for dizziness, weakness, light-headedness and numbness.   Hematological:  Does not bruise/bleed easily.       Physical Exam  Physical Exam  Vitals and nursing note reviewed.   Constitutional:       General: He is not in acute distress.     Appearance: He is obese. He is not ill-appearing or diaphoretic.   HENT:      Head: Normocephalic and atraumatic.      Nose: Nose normal.      Mouth/Throat:      Mouth: Mucous membranes are moist.   Eyes:      General: No scleral icterus.        Right eye: No discharge.         Left eye: No discharge.      Conjunctiva/sclera: Conjunctivae normal.   Cardiovascular:      Rate and Rhythm: Tachycardia present. Rhythm irregular.      Heart sounds: No murmur heard.  Pulmonary:      Effort: Pulmonary effort is normal. No respiratory distress.      Breath sounds: No wheezing, rhonchi or rales.   Abdominal:      General: There is no distension.      Palpations: Abdomen is soft.      Tenderness: There is no abdominal tenderness. There is no guarding or rebound.   Musculoskeletal:         General: No swelling or deformity.      Cervical back: Neck supple.      Right lower leg: No edema.      Left lower leg: No edema.   Skin:     General: Skin is warm and dry.      Findings: No rash.   Neurological:      General: No focal deficit present.      Mental Status: He is alert and oriented to person, place, and time.   Psychiatric:         Mood and Affect: Mood normal.         Behavior: Behavior normal.         Vital Signs  ED Triage Vitals   Temperature Pulse Respirations Blood Pressure SpO2   03/21/24 1046 03/21/24 1046 03/21/24 1046 03/21/24 1046 03/21/24 1046   98.2 °F (36.8 °C) 105 18 126/60 100 %      Temp Source Heart Rate Source Patient Position - Orthostatic VS BP Location FiO2  (%)   03/21/24 1046 03/21/24 1046 03/21/24 1046 03/21/24 1046 --   Oral Monitor Sitting Left arm       Pain Score       03/21/24 1352       No Pain           Vitals:    03/21/24 1232 03/21/24 1247 03/21/24 1342 03/21/24 1514   BP: 101/52 106/69 132/70 119/77   Pulse: 101 104 99 104   Patient Position - Orthostatic VS: Sitting Sitting           Visual Acuity      ED Medications  Medications   diltiazem (CARDIZEM) 125 mg in sodium chloride 0.9 % 125 mL infusion (12.5 mg/hr Intravenous Handoff 3/21/24 1403)   pantoprazole (PROTONIX) EC tablet 40 mg (has no administration in time range)   acetaminophen (TYLENOL) tablet 650 mg (has no administration in time range)   docusate sodium (COLACE) capsule 100 mg (has no administration in time range)   metoprolol tartrate (LOPRESSOR) tablet 25 mg (25 mg Oral Given 3/21/24 1514)   apixaban (ELIQUIS) tablet 5 mg (has no administration in time range)   sodium chloride 0.9 % bolus 1,000 mL (0 mL Intravenous Stopped 3/21/24 1213)   magnesium sulfate 2 g/50 mL IVPB (premix) 2 g (0 g Intravenous Stopped 3/21/24 1551)   diltiazem (CARDIZEM) injection 15 mg (15 mg Intravenous Given 3/21/24 1212)       Diagnostic Studies  Results Reviewed       Procedure Component Value Units Date/Time    HS Troponin I 4hr [[id number]]  (Normal) Collected: 03/21/24 1520    Lab Status: Final result Specimen: Blood from Arm, Left Updated: 03/21/24 1555     hs TnI 4hr 15 ng/L      Delta 4hr hsTnI 0 ng/L     HS Troponin I 2hr [[id number]]  (Normal) Collected: 03/21/24 1349    Lab Status: Final result Specimen: Blood from Arm, Left Updated: 03/21/24 1422     hs TnI 2hr 14 ng/L      Delta 2hr hsTnI -1 ng/L     Platelet count [[id number]]  (Normal) Collected: 03/21/24 1349    Lab Status: Final result Specimen: Blood from Arm, Left Updated: 03/21/24 1354     Platelets 221 Thousands/uL      MPV 9.9 fL     TSH, 3rd generation with Free T4 reflex [[id number]]  (Normal) Collected: 03/21/24 1105    Lab Status: Final  result Specimen: Blood from Arm, Right Updated: 03/21/24 1203     TSH 3RD GENERATON 1.379 uIU/mL     HS Troponin 0hr (reflex protocol) [[id number]]  (Normal) Collected: 03/21/24 1105    Lab Status: Final result Specimen: Blood from Arm, Right Updated: 03/21/24 1152     hs TnI 0hr 15 ng/L     Comprehensive metabolic panel [[id number]] Collected: 03/21/24 1116    Lab Status: Final result Specimen: Blood from Arm, Right Updated: 03/21/24 1151     Sodium 139 mmol/L      Potassium 3.8 mmol/L      Chloride 103 mmol/L      CO2 29 mmol/L      ANION GAP 7 mmol/L      BUN 15 mg/dL      Creatinine 0.69 mg/dL      Glucose 98 mg/dL      Calcium 9.5 mg/dL      AST 15 U/L      ALT 13 U/L      Alkaline Phosphatase 34 U/L      Total Protein 7.4 g/dL      Albumin 4.2 g/dL      Total Bilirubin 0.41 mg/dL      eGFR 114 ml/min/1.73sq m     Narrative:      [employer] guidelines for Chronic Kidney Disease (CKD):     Stage 1 with normal or high GFR (GFR > 90 mL/min/1.73 square meters)    Stage 2 Mild CKD (GFR = 60-89 mL/min/1.73 square meters)    Stage 3A Moderate CKD (GFR = 45-59 mL/min/1.73 square meters)    Stage 3B Moderate CKD (GFR = 30-44 mL/min/1.73 square meters)    Stage 4 Severe CKD (GFR = 15-29 mL/min/1.73 square meters)    Stage 5 End Stage CKD (GFR <15 mL/min/1.73 square meters)  Note: GFR calculation is accurate only with a steady state creatinine    Magnesium [[id number]]  (Abnormal) Collected: 03/21/24 1116    Lab Status: Final result Specimen: Blood from Arm, Right Updated: 03/21/24 1151     Magnesium 1.8 mg/dL     Protime-INR [[id number]]  (Normal) Collected: 03/21/24 1105    Lab Status: Final result Specimen: Blood from Arm, Right Updated: 03/21/24 1140     Protime 14.3 seconds      INR 1.05    APTT [[id number]]  (Normal) Collected: 03/21/24 1105    Lab Status: Final result Specimen: Blood from Arm, Right Updated: 03/21/24 1140     PTT 29 seconds     CBC and differential [[id number]]  (Abnormal)  Collected: 03/21/24 1105    Lab Status: Final result Specimen: Blood from Arm, Right Updated: 03/21/24 1124     WBC 9.92 Thousand/uL      RBC 4.93 Million/uL      Hemoglobin 13.8 g/dL      Hematocrit 42.8 %      MCV 87 fL      MCH 28.0 pg      MCHC 32.2 g/dL      RDW 14.0 %      MPV 10.1 fL      Platelets 215 Thousands/uL      nRBC 0 /100 WBCs      Neutrophils Relative 79 %      Immature Grans % 0 %      Lymphocytes Relative 13 %      Monocytes Relative 7 %      Eosinophils Relative 1 %      Basophils Relative 0 %      Neutrophils Absolute 7.83 Thousands/µL      Absolute Immature Grans 0.03 Thousand/uL      Absolute Lymphocytes 1.29 Thousands/µL      Absolute Monocytes 0.68 Thousand/µL      Eosinophils Absolute 0.05 Thousand/µL      Basophils Absolute 0.04 Thousands/µL                    No orders to display              Procedures  ECG 12 Lead Documentation Only    Date/Time: 3/21/2024 11:05 AM    Performed by: Rosana Mishra MD  Authorized by: Rosana Mishra MD    Indications / Diagnosis:  Palpitations, tachycardia  ECG reviewed by me, the ED Provider: yes    Patient location:  ED  Previous ECG:     Previous ECG:  Compared to current    Comparison ECG info:  12/28/2021    Similarity:  Changes noted  Interpretation:     Interpretation: abnormal    Rate:     ECG rate:  116    ECG rate assessment: tachycardic    Rhythm:     Rhythm: atrial fibrillation    Ectopy:     Ectopy: PVCs      PVCs:  Infrequent  QRS:     QRS axis:  Normal    QRS intervals:  Normal  Conduction:     Conduction: normal    ST segments:     ST segments:  Normal  T waves:     T waves: normal    Comments:      Atrial fibrillation is new        ED Course  ED Course as of 03/21/24 1612   u Mar 21, 2024   1113 Patient received 20 mg IV diltiazem prior to arrival   1134 CBC and differential(!)  Grossly normal   1150 POCT INR: 1.05   1151 Comprehensive metabolic panel  Grossly normal   1152 MAGNESIUM(!): 1.8   1154 hs TnI 0hr:  15   1154 Patient's brother updated at his request   1200 Patient's HR still in 130s; up-titrating drip to 12.5 mg/hr   1203 Will order additional diltiazem bolus 15 mg to be given as well   1203 TSH 3RD GENERATON: 1.379   1230 HR improved to 105; TT to SLIM re: admission         Medical Decision Making  43-year-old male presenting for evaluation of palpitations.  Differential diagnosis includes dysrhythmia, electrolyte derangement, anemia, thyroid dysfunction, dehydration.  EKG for EMS and on arrival to the emergency department is notable for atrial fibrillation with rapid ventricular response.  This is a new onset diagnosis for the patient.  Laboratory studies obtained, which shows grossly normal CBC and CMP.  Initial troponin is 15; low suspicion for ACS but will continue to trend.  Patient denies associated chest pain and shortness of breath.  Patient has a mildly low magnesium; he was given IV magnesium replacement in addition to IV fluid hydration.  Patient initiated on a diltiazem infusion and given an additional diltiazem bolus 15 mg.  He had improvement of his heart rate to the low 100s.  Patient to be admitted to medicine for further care.  Patient and family are in agreement with this plan.    Amount and/or Complexity of Data Reviewed  Labs: ordered. Decision-making details documented in ED Course.    Risk  Prescription drug management.  Decision regarding hospitalization.             Disposition  Final diagnoses:   Atrial fibrillation with RVR (HCC)   Hypomagnesemia     Time reflects when diagnosis was documented in both MDM as applicable and the Disposition within this note       Time User Action Codes Description Comment    3/21/2024 11:58 AM Rosana Mishra Add [I48.91] Atrial fibrillation with RVR (HCC)     3/21/2024 11:59 AM Rosana Mishra Add [E83.42] Hypomagnesemia           ED Disposition       ED Disposition   Admit    Condition   Stable    Date/Time   Thu Mar 21, 2024 11:58 AM    Comment    Case was discussed with Dr. Price and the patient's admission status was agreed to be Admission Status: inpatient status to the service of Dr. Price .               Follow-up Information    None         Current Discharge Medication List        CONTINUE these medications which have NOT CHANGED    Details   acetaminophen (TYLENOL) 325 mg tablet Take 2 tablets (650 mg total) by mouth every 6 (six) hours as needed for mild pain  Refills: 0    Associated Diagnoses: Ureterolithiasis; Renal calculus      docusate sodium (COLACE) 100 mg capsule Take 1 capsule (100 mg total) by mouth daily  Refills: 0    Associated Diagnoses: Constipation, unspecified constipation type      lisinopril (ZESTRIL) 40 mg tablet Take 1 tablet (40 mg total) by mouth daily  Qty: 30 tablet, Refills: 3    Associated Diagnoses: Essential hypertension      omeprazole (PriLOSEC) 20 mg delayed release capsule Take 1 capsule (20 mg total) by mouth daily  Qty: 30 capsule, Refills: 3    Associated Diagnoses: Gastroesophageal reflux disease without esophagitis             No discharge procedures on file.    PDMP Review       None            ED Provider  Electronically Signed by             Rosana Mishra MD  03/21/24 3009

## 2024-03-21 NOTE — CONSULTS
Consultation - Cardiology Team 1  Maurilio Connors 43 y.o. male MRN: [id number]  Unit/Bed#: S -01 Encounter: [id number]    Assessment/Plan     Principal Problem:    New onset a-fib with RVR  Active Problems:    HTN (hypertension)    Morbid obesity (HCC)    Hypomagnesemia      Assessment  Atrial fibrillation with RVR        With palpitations starting this morning.  Has had in the past and         infrequent and less intense.        This is a new diagnosis        Previous ECG 2021-sinus rhythm        [id number] =1        Currently on IV Cardizem 12.5mg/hr. HR low 100's        BMP, CBC, TSH unremarkable        Magnesium 1.8-being repleted  Morbid obesity-BMI 54.  Patient states in his adult life he has always been greater than 300 pounds.  Hypertension-presented normotensive.  Since IV Cardizem he is low normal to normotensive.  PTA lisinopril 40 mg daily on hold.  GERD-reports good control on omeprazole  Reported history of hypertriglyceridemia        FLP 20/2023 cholesterol 126/HDL 32/calculated LDL 73/triglyceride 104    Plan  Recommend AV magdalena blocker for rate control. Will start lopressor 25mg every 6 hours. Wean off IV cardizem for HR <100.   Recommend anticoagulation. Discussed with patient who is agreeable. Will start eliquis 5mg BID.   Recommend transthoracic echocardiogram and suspect will proceed with SANDHYA/DCCV with hopes to restore sinus rhythm.  Will keep n.p.o. after midnight tonight.  Recommend BRITTON workup  Lisinopril on hold as we will be start on oral AV magdalena blocker   Replete magnesium to greater than 2  Recommend weight management  Will f/u with TTE    History of Present Illness   Physician Requesting Consult: Nila Price MD  Reason for Consult / Principal Problem: New onset atrial fibrillation    HPI: Maurilio Connors is a 43 y.o. year old morbidly obese male pretension , hypertriglyceridemia , COVID 12/2023 and GERD who presents with palpitations starting this morning. He was found to  be in atrial fibrillation with heart rate 1 60-1 80s in the ED per admitting provider. ECG-and documented  's  This is a new diagnosis. He was started on IV Cardizem.  His heart rate has improved.  He still has palpitations but improved.  Denies any recent chest pain or shortness of breath.  He is extremely sedentary.  His walking is to his elevator in his apartment building.  He is living with his mother and assisting caring for her.  He is not actively working since 2013.    BMP normal.  Magnesium 1.8.  Troponin 15.  H&H normal.  WBC normal.  TSH normal.    He reports a family history of CAD, hypertension and diabetes.  He does not drink alcohol.  No tobacco use.  No illicit drug use.    He underwent urological surgery 2022 and there were no reported issues with anesthesia.    Inpatient consult to Cardiology  Consult performed by: JANINE Castle  Consult ordered by: Kelly Bronson PA-C          Review of Systems   Constitutional: Negative.    HENT: Negative.     Eyes: Negative.    Respiratory: Negative.     Cardiovascular:  Negative for palpitations.   Gastrointestinal: Negative.    Endocrine: Negative.    Genitourinary: Negative.    Musculoskeletal: Negative.    Allergic/Immunologic: Negative.    Neurological: Negative.    Hematological: Negative.    Psychiatric/Behavioral: Negative.     All other systems reviewed and are negative.      Historical Information   Past Medical History:   Diagnosis Date    ADILSON (acute kidney injury) (HCC)     Anxiety     GERD (gastroesophageal reflux disease)     Hypertension     Kidney stone     Murmur, cardiac     Stress incontinence     Urethral stricture     Use of cane as ambulatory aid     as needed    Wears glasses      Past Surgical History:   Procedure Laterality Date    CHOLECYSTECTOMY      2004    CYSTOSCOPY N/A 10/19/2021    Procedure: CYSTOSCOPY;  Surgeon: Pilo Horton MD;  Location: [facility];  Service: Urology    NY CYSTO BLADDER W/URETERAL  CATHETERIZATION Left 12/29/2021    Procedure: CYSTOSCOPY RETROGRADE PYELOGRAM WITH INSERTION STENT URETERAL LEFT, DVIU;  Surgeon: Ayush Holland MD;  Location: [facility];  Service: Urology    SC CYSTO/URETERO W/LITHOTRIPSY &INDWELL STENT INSRT Left 1/14/2022    Procedure: CYSTOSCOPY, left URETEROSCOPY, removal of migrated stent, insertion new left ureteral stent placement ;  Surgeon: Ayush Holland MD;  Location: [facility];  Service: Urology    SC CYSTO/URETERO W/LITHOTRIPSY &INDWELL STENT INSRT Left 2/23/2022    Procedure: CYSTOSCOPY, URETEROSCOPY, BASKET EXTRACTION, STENT EXCHANGE LEFT URETER;  Surgeon: Ayush Holland MD;  Location: [facility];  Service: Urology    SC CYSTOURETHROSCOPY W/INTERNAL URETHROTOMY N/A 10/19/2021    Procedure: DIRECT VISUAL INTERAL URETHROTOMY (DVIU);  Surgeon: Pilo Horton MD;  Location: [facility];  Service: Urology     Social History     Substance and Sexual Activity   Alcohol Use Not Currently     Social History     Substance and Sexual Activity   Drug Use Never     Social History     Tobacco Use   Smoking Status Former    Types: Cigarettes   Smokeless Tobacco Never     Family History: History reviewed. No pertinent family history.    Meds/Allergies   current meds:   Current Facility-Administered Medications   Medication Dose Route Frequency    acetaminophen (TYLENOL) tablet 650 mg  650 mg Oral Q6H PRN    diltiazem (CARDIZEM) 125 mg in sodium chloride 0.9 % 125 mL infusion  1-15 mg/hr Intravenous Titrated    docusate sodium (COLACE) capsule 100 mg  100 mg Oral BID PRN    enoxaparin (LOVENOX) subcutaneous injection 60 mg  60 mg Subcutaneous BID    magnesium sulfate 2 g/50 mL IVPB (premix) 2 g  2 g Intravenous Once    [START ON 3/22/2024] pantoprazole (PROTONIX) EC tablet 40 mg  40 mg Oral Early Morning    and PTA meds:    Medications Prior to Admission   Medication    acetaminophen (TYLENOL) 325 mg tablet    docusate sodium (COLACE) 100 mg capsule    lisinopril (ZESTRIL)  "40 mg tablet    omeprazole (PriLOSEC) 20 mg delayed release capsule     No Known Allergies    Objective   Vitals: Blood pressure 106/69, pulse 104, temperature 98.2 °F (36.8 °C), temperature source Oral, resp. rate 18, height 5' 11\" (1.803 m), weight (!) 177 kg (390 lb 3.4 oz), SpO2 95%.  Orthostatic Blood Pressures      Flowsheet Row Most Recent Value   Blood Pressure 106/69 filed at 03/21/2024 1247   Patient Position - Orthostatic VS Sitting filed at 03/21/2024 1247            No intake or output data in the 24 hours ending 03/21/24 1338    Invasive Devices       Peripheral Intravenous Line  Duration             Peripheral IV 03/21/24 Distal;Right;Upper;Ventral (anterior) Arm <1 day              Drain  Duration             Ureteral Drain/Stent Left ureter 797 days                    Physical Exam: /69 (BP Location: Left arm)   Pulse 104   Temp 98.2 °F (36.8 °C) (Oral)   Resp 18   Ht 5' 11\" (1.803 m)   Wt (!) 177 kg (390 lb 3.4 oz)   SpO2 95%   BMI 54.42 kg/m²   General Appearance:    Alert, cooperative, no distress, morbidly obese    Head:    Normocephalic, no scleral icterus   Eyes:    PERRL   Nose:   Nares normal, septum midline, mucosa normal, no drainage    Throat:   Lips, mucosa, and tongue normal   Neck:   Supple, symmetrical, trachea midline     no carotid bruit or JVD       Lungs:     Clear to auscultation bilaterally, respirations unlabored   Chest Wall:    No tenderness or deformity    Heart:    Irregular rate and rhythm, S1 and S2 normal, no murmur, rub   or gallop   Abdomen:     Soft, non-tender, bowel sounds active all four quadrants,     no masses, no organomegaly   Extremities:   Extremities normal, atraumatic, no cyanosis or edema   Pulses:   2+ and symmetric all extremities   Skin:   Skin color, texture, turgor normal, no rashes or lesions   Neurologic:   Alert and oriented to person place and time. No focal deficits       Lab Results:   Recent Results (from the past 72 hour(s))   ECG " "12 lead    Collection Time: 03/21/24 10:48 AM   Result Value Ref Range    Ventricular Rate 116 BPM    Atrial Rate 94 BPM    AK Interval  ms    QRSD Interval 88 ms    QT Interval 296 ms    QTC Interval 411 ms    P Axis  degrees    QRS Axis 58 degrees    T Wave Axis 13 degrees   CBC and differential    Collection Time: 03/21/24 11:05 AM   Result Value Ref Range    WBC 9.92 4.31 - 10.16 Thousand/uL    RBC 4.93 3.88 - 5.62 Million/uL    Hemoglobin 13.8 12.0 - 17.0 g/dL    Hematocrit 42.8 36.5 - 49.3 %    MCV 87 82 - 98 fL    MCH 28.0 26.8 - 34.3 pg    MCHC 32.2 31.4 - 37.4 g/dL    RDW 14.0 11.6 - 15.1 %    MPV 10.1 8.9 - 12.7 fL    Platelets 215 149 - 390 Thousands/uL    nRBC 0 /100 WBCs    Neutrophils Relative 79 (H) 43 - 75 %    Immature Grans % 0 0 - 2 %    Lymphocytes Relative 13 (L) 14 - 44 %    Monocytes Relative 7 4 - 12 %    Eosinophils Relative 1 0 - 6 %    Basophils Relative 0 0 - 1 %    Neutrophils Absolute 7.83 (H) 1.85 - 7.62 Thousands/µL    Absolute Immature Grans 0.03 0.00 - 0.20 Thousand/uL    Absolute Lymphocytes 1.29 0.60 - 4.47 Thousands/µL    Absolute Monocytes 0.68 0.17 - 1.22 Thousand/µL    Eosinophils Absolute 0.05 0.00 - 0.61 Thousand/µL    Basophils Absolute 0.04 0.00 - 0.10 Thousands/µL   TSH, 3rd generation with Free T4 reflex    Collection Time: 03/21/24 11:05 AM   Result Value Ref Range    TSH 3RD GENERATON 1.379 0.450 - 4.500 uIU/mL   Protime-INR    Collection Time: 03/21/24 11:05 AM   Result Value Ref Range    Protime 14.3 11.6 - 14.5 seconds    INR 1.05 0.84 - 1.19   APTT    Collection Time: 03/21/24 11:05 AM   Result Value Ref Range    PTT 29 23 - 37 seconds   HS Troponin 0hr (reflex protocol)    Collection Time: 03/21/24 11:05 AM   Result Value Ref Range    hs TnI 0hr 15 \"Refer to ACS Flowchart\"- see link ng/L   Comprehensive metabolic panel    Collection Time: 03/21/24 11:16 AM   Result Value Ref Range    Sodium 139 135 - 147 mmol/L    Potassium 3.8 3.5 - 5.3 mmol/L    Chloride 103 96 " - 108 mmol/L    CO2 29 21 - 32 mmol/L    ANION GAP 7 4 - 13 mmol/L    BUN 15 5 - 25 mg/dL    Creatinine 0.69 0.60 - 1.30 mg/dL    Glucose 98 65 - 140 mg/dL    Calcium 9.5 8.4 - 10.2 mg/dL    AST 15 13 - 39 U/L    ALT 13 7 - 52 U/L    Alkaline Phosphatase 34 34 - 104 U/L    Total Protein 7.4 6.4 - 8.4 g/dL    Albumin 4.2 3.5 - 5.0 g/dL    Total Bilirubin 0.41 0.20 - 1.00 mg/dL    eGFR 114 ml/min/1.73sq m   Magnesium    Collection Time: 03/21/24 11:16 AM   Result Value Ref Range    Magnesium 1.8 (L) 1.9 - 2.7 mg/dL     Imaging: I have personally reviewed pertinent reports.    EKG: atrial fibrillation rvr pvc      Code Status: Level 1 - Full Code  Advance Directive and Living Will:      Power of :    POLST:      Counseling / Coordination of Care  Total floor / unit time spent today 50 minutes.  Greater than 50% of total time was spent with the patient and / or family counseling and / or coordination of care.

## 2024-03-22 ENCOUNTER — ANESTHESIA (OUTPATIENT)
Dept: NON INVASIVE DIAGNOSTICS | Facility: HOSPITAL | Age: 44
End: 2024-03-22
Payer: COMMERCIAL

## 2024-03-22 ENCOUNTER — ANESTHESIA EVENT (OUTPATIENT)
Dept: NON INVASIVE DIAGNOSTICS | Facility: HOSPITAL | Age: 44
End: 2024-03-22
Payer: COMMERCIAL

## 2024-03-22 ENCOUNTER — APPOINTMENT (OUTPATIENT)
Dept: NON INVASIVE DIAGNOSTICS | Facility: HOSPITAL | Age: 44
End: 2024-03-22
Payer: COMMERCIAL

## 2024-03-22 VITALS
WEIGHT: 315 LBS | RESPIRATION RATE: 18 BRPM | HEIGHT: 71 IN | OXYGEN SATURATION: 96 % | BODY MASS INDEX: 44.1 KG/M2 | HEART RATE: 79 BPM | SYSTOLIC BLOOD PRESSURE: 105 MMHG | TEMPERATURE: 97.4 F | DIASTOLIC BLOOD PRESSURE: 55 MMHG

## 2024-03-22 LAB
ATRIAL RATE: 76 BPM
EST. AVERAGE GLUCOSE BLD GHB EST-MCNC: 114 MG/DL
HBA1C MFR BLD: 5.6 %
P AXIS: 39 DEGREES
PR INTERVAL: 138 MS
QRS AXIS: 20 DEGREES
QRSD INTERVAL: 94 MS
QT INTERVAL: 366 MS
QTC INTERVAL: 411 MS
SL CV LV EF: 65
T WAVE AXIS: 15 DEGREES
TR PEAK VELOCITY: 2 M/S
VENTRICULAR RATE: 76 BPM

## 2024-03-22 PROCEDURE — 92960 CARDIOVERSION ELECTRIC EXT: CPT | Performed by: INTERNAL MEDICINE

## 2024-03-22 PROCEDURE — 93312 ECHO TRANSESOPHAGEAL: CPT

## 2024-03-22 PROCEDURE — 92960 CARDIOVERSION ELECTRIC EXT: CPT

## 2024-03-22 PROCEDURE — 93312 ECHO TRANSESOPHAGEAL: CPT | Performed by: INTERNAL MEDICINE

## 2024-03-22 PROCEDURE — 93320 DOPPLER ECHO COMPLETE: CPT | Performed by: INTERNAL MEDICINE

## 2024-03-22 PROCEDURE — 83036 HEMOGLOBIN GLYCOSYLATED A1C: CPT | Performed by: PHYSICIAN ASSISTANT

## 2024-03-22 PROCEDURE — 93005 ELECTROCARDIOGRAM TRACING: CPT

## 2024-03-22 PROCEDURE — 99239 HOSP IP/OBS DSCHRG MGMT >30: CPT | Performed by: PHYSICIAN ASSISTANT

## 2024-03-22 PROCEDURE — 93325 DOPPLER ECHO COLOR FLOW MAPG: CPT | Performed by: INTERNAL MEDICINE

## 2024-03-22 PROCEDURE — 99214 OFFICE O/P EST MOD 30 MIN: CPT | Performed by: INTERNAL MEDICINE

## 2024-03-22 PROCEDURE — 93010 ELECTROCARDIOGRAM REPORT: CPT | Performed by: INTERNAL MEDICINE

## 2024-03-22 RX ORDER — METOPROLOL TARTRATE 50 MG/1
50 TABLET, FILM COATED ORAL ONCE
Status: DISCONTINUED | OUTPATIENT
Start: 2024-03-22 | End: 2024-03-22

## 2024-03-22 RX ORDER — SODIUM CHLORIDE, SODIUM LACTATE, POTASSIUM CHLORIDE, CALCIUM CHLORIDE 600; 310; 30; 20 MG/100ML; MG/100ML; MG/100ML; MG/100ML
INJECTION, SOLUTION INTRAVENOUS CONTINUOUS PRN
Status: DISCONTINUED | OUTPATIENT
Start: 2024-03-22 | End: 2024-03-22

## 2024-03-22 RX ORDER — ONDANSETRON 2 MG/ML
4 INJECTION INTRAMUSCULAR; INTRAVENOUS ONCE AS NEEDED
Status: CANCELLED | OUTPATIENT
Start: 2024-03-22

## 2024-03-22 RX ORDER — METOCLOPRAMIDE HYDROCHLORIDE 5 MG/ML
10 INJECTION INTRAMUSCULAR; INTRAVENOUS ONCE AS NEEDED
Status: CANCELLED | OUTPATIENT
Start: 2024-03-22

## 2024-03-22 RX ORDER — METOPROLOL TARTRATE 50 MG/1
50 TABLET, FILM COATED ORAL ONCE
Status: COMPLETED | OUTPATIENT
Start: 2024-03-22 | End: 2024-03-22

## 2024-03-22 RX ORDER — SODIUM CHLORIDE, SODIUM LACTATE, POTASSIUM CHLORIDE, CALCIUM CHLORIDE 600; 310; 30; 20 MG/100ML; MG/100ML; MG/100ML; MG/100ML
50 INJECTION, SOLUTION INTRAVENOUS CONTINUOUS
Status: CANCELLED | OUTPATIENT
Start: 2024-03-22

## 2024-03-22 RX ORDER — ALBUTEROL SULFATE 2.5 MG/3ML
2.5 SOLUTION RESPIRATORY (INHALATION) ONCE AS NEEDED
Status: CANCELLED | OUTPATIENT
Start: 2024-03-22

## 2024-03-22 RX ORDER — DOCUSATE SODIUM 100 MG/1
100 CAPSULE, LIQUID FILLED ORAL AS NEEDED
Start: 2024-03-22

## 2024-03-22 RX ORDER — METOPROLOL TARTRATE 50 MG/1
50 TABLET, FILM COATED ORAL EVERY 12 HOURS SCHEDULED
Qty: 60 TABLET | Refills: 0 | Status: SHIPPED | OUTPATIENT
Start: 2024-03-22 | End: 2024-04-01 | Stop reason: ALTCHOICE

## 2024-03-22 RX ORDER — PROPOFOL 10 MG/ML
INJECTION, EMULSION INTRAVENOUS AS NEEDED
Status: DISCONTINUED | OUTPATIENT
Start: 2024-03-22 | End: 2024-03-22

## 2024-03-22 RX ORDER — LIDOCAINE HYDROCHLORIDE 10 MG/ML
INJECTION, SOLUTION EPIDURAL; INFILTRATION; INTRACAUDAL; PERINEURAL AS NEEDED
Status: DISCONTINUED | OUTPATIENT
Start: 2024-03-22 | End: 2024-03-22

## 2024-03-22 RX ADMIN — PROPOFOL 50 MG: 10 INJECTION, EMULSION INTRAVENOUS at 08:34

## 2024-03-22 RX ADMIN — METOPROLOL TARTRATE 25 MG: 25 TABLET, FILM COATED ORAL at 03:57

## 2024-03-22 RX ADMIN — PROPOFOL 50 MG: 10 INJECTION, EMULSION INTRAVENOUS at 08:43

## 2024-03-22 RX ADMIN — METOPROLOL TARTRATE 50 MG: 50 TABLET, FILM COATED ORAL at 12:01

## 2024-03-22 RX ADMIN — PROPOFOL 50 MG: 10 INJECTION, EMULSION INTRAVENOUS at 08:40

## 2024-03-22 RX ADMIN — PROPOFOL 50 MG: 10 INJECTION, EMULSION INTRAVENOUS at 08:31

## 2024-03-22 RX ADMIN — PROPOFOL 50 MG: 10 INJECTION, EMULSION INTRAVENOUS at 08:46

## 2024-03-22 RX ADMIN — LIDOCAINE HYDROCHLORIDE 100 MG: 10 INJECTION, SOLUTION EPIDURAL; INFILTRATION; INTRACAUDAL; PERINEURAL at 08:25

## 2024-03-22 RX ADMIN — SODIUM CHLORIDE, SODIUM LACTATE, POTASSIUM CHLORIDE, AND CALCIUM CHLORIDE: .6; .31; .03; .02 INJECTION, SOLUTION INTRAVENOUS at 07:50

## 2024-03-22 RX ADMIN — PROPOFOL 100 MG: 10 INJECTION, EMULSION INTRAVENOUS at 08:25

## 2024-03-22 RX ADMIN — APIXABAN 5 MG: 5 TABLET, FILM COATED ORAL at 09:39

## 2024-03-22 RX ADMIN — PROPOFOL 50 MG: 10 INJECTION, EMULSION INTRAVENOUS at 08:28

## 2024-03-22 RX ADMIN — PROPOFOL 50 MG: 10 INJECTION, EMULSION INTRAVENOUS at 08:37

## 2024-03-22 NOTE — ASSESSMENT & PLAN NOTE
Patient reported new palpitations in the middle of the night.  He was found to be in rapid A-fib with heart rates initially in the 160-180 range  No clear exacerbating factors such as thyroid disease, underlying heart disease, recent infection or alcohol use  S/p SANDHYA/DCCV - now in nsr  Start Lopressor 50 mg twice daily  Start Eliquis 5 mg twice daily  Outpatient cardiology follow-up  Recommend outpatient sleep study to assess for sleep apnea

## 2024-03-22 NOTE — PROGRESS NOTES
Progress Note - Cardiology   Maurilio Connors 43 y.o. male MRN: [id number]  Unit/Bed#: S -01 Encounter: [id number]  03/22/24  8:59 AM    Impression and Plan:    43-year-old male with hypertension, hypertriglyceridemia, GERD, morbid obesity-BMI of 54, presenting with new onset atrial fibrillation-symptomatic with palpitations, currently on Metoprolol 25 qid - started here   Minimally active at baseline.  Not a great historian.     Plan:     New onset atrial fibrillation: Normal TSH,   S/P  SANDHYA-cardioversion today - essentially normal study,   Single biphasic 300 J shock  He tells me that he cannot move his neck but cannot clearly delineate why -likely has untreated sleep apnea.  For rate control use metoprolol -- change to 50 twice daily chronically.  Anticoagulation with Eliquis - compliance  reinforced yesterday and pre procedure today as well  Outpatient evaluation for sleep apnea and treatment if feasible and agreeable     Hypertension: Improved with introduction of metoprolol.     History of triglyceridemia: Normal lipids most recently-12/2023    Ok for DC later today if stays in NSR,    Out pt follow up with cardiology      ===================================================================    Chief Complaint:   Chief Complaint   Patient presents with    Palpitations     Pt arrives via EMS. Pt reports waking up at 4 am with palpitations, took a tylenol without relief. Pt denies CP/SOB. EMS reports administering 20 mg Cardizem in ambulance after seeing HR in 160-180's.         Subjective/Objective     Subjective: Patient denies any complaints.  Specifically denies chest pains or shortness of breath     Objective: Comfortable , no distress at the time of exam      Patient Active Problem List   Diagnosis    Difficulty urinating    HTN (hypertension)    SIRS (systemic inflammatory response syndrome) (HCC)    Renal calculus    Urethral stricture    Left ureteral stone    ADILSON (acute kidney injury) (HCC)     "Constipation    Gastroesophageal reflux disease    Anxiety    Morbid obesity (HCC)    New onset a-fib with RVR    Hypomagnesemia       Vitals: /55   Pulse 77   Temp 97.6 °F (36.4 °C) (Temporal)   Resp 18   Ht 5' 11\" (1.803 m)   Wt (!) 177 kg (390 lb)   SpO2 98%   BMI 54.39 kg/m²     I/O this shift:  In: 350 [I.V.:350]  Out: -   Wt Readings from Last 3 Encounters:   03/22/24 (!) 177 kg (390 lb)   02/23/22 (!) 191 kg (420 lb)   01/14/22 (!) 191 kg (420 lb)       Intake/Output Summary (Last 24 hours) at 3/22/2024 0859  Last data filed at 3/22/2024 0847  Gross per 24 hour   Intake 1310 ml   Output 1450 ml   Net -140 ml     I/O last 3 completed shifts:  In: 960 [P.O.:960]  Out: 1450 [Urine:1450]    Invasive Devices       Peripheral Intravenous Line  Duration             Peripheral IV 03/21/24 Distal;Right;Upper;Ventral (anterior) Arm <1 day              Drain  Duration             Ureteral Drain/Stent Left ureter 797 days                      Physical Exam:  GEN: Maurilio Connors appears ok, alert and oriented x 3, pleasant and cooperative, obese  HEENT: pupils equal, round, and reactive to light; extraocular muscles intact  NECK: supple, no carotid bruits or JVD  HEART: regular rhythm, normal S1 and S2, no murmur, no clicks, gallops or rubs   LUNGS: clear to auscultation bilaterally; no wheezes or rhonchi, no rales  ABDOMEN/GI: normal bowel sounds, soft, no tenderness, no distention  EXTREMITIES/Musculoskeltal: peripheral pulses normal; no clubbing, cyanosis, no edema  NEURO: no focal motor findings   SKIN: normal without suspicious lesions on exposed skin              Lab Results:       Results from last 7 days   Lab Units 03/21/24  1349 03/21/24  1105   WBC Thousand/uL  --  9.92   HEMOGLOBIN g/dL  --  13.8   HEMATOCRIT %  --  42.8   PLATELETS Thousands/uL 221 215         Results from last 7 days   Lab Units 03/21/24  1116   POTASSIUM mmol/L 3.8   CHLORIDE mmol/L 103   CO2 mmol/L 29   BUN mg/dL 15 " "  CREATININE mg/dL 0.69   CALCIUM mg/dL 9.5   ALK PHOS U/L 34   ALT U/L 13   AST U/L 15     Results from last 7 days   Lab Units 03/21/24  1105   INR  1.05       Imaging: I have personally reviewed pertinent reports.    EKG/Telemtry: now in NSR    Scheduled Meds:  Current Facility-Administered Medications   Medication Dose Route Frequency Provider Last Rate    acetaminophen  650 mg Oral Q6H PRN Kelly Bronson PA-C      apixaban  5 mg Oral BID JANINE Castle      docusate sodium  100 mg Oral BID PRN Kelly Bronson PA-C      metoprolol tartrate  25 mg Oral Q6H JANINE Castle      pantoprazole  40 mg Oral Early Morning Kelly Bronson PA-C       Continuous Infusions:       VTE Pharmacologic Prophylaxis: Reason for no pharmacologic prophylaxis on apixaban    This note was completed in part utilizing [employer] direct voice recognition software.   Grammatical errors, random word insertion, spelling mistakes, occasional wrong word or \"sound-alike\" substitutions and incomplete sentences may be an occasional consequence of the system secondary to software limitations, ambient noise and hardware issues. At the time of dictation, efforts were made to edit, clarify and /or correct errors.  Please read the chart carefully and recognize, using context, where substitutions have occurred.  If you have any questions or concerns about the context, text or information contained within the body of this dictation, please contact myself, the provider, for further clarification.        "

## 2024-03-22 NOTE — DISCHARGE INSTR - AVS FIRST PAGE
Dear Maurilio Connors,     It was our pleasure to care for you here at [facility].  It is our hope that we were always able to exceed the expected standards for your care during your stay.  You were hospitalized due to a. fib.  You were cared for on the 3rd floor by Angel Kellogg PA-C under the service of Donnell Joya, * with the [facility] who covers for your primary care physician (PCP), Bello Sheikh DO, while you were hospitalized.  If you have any questions or concerns related to this hospitalization, you may contact us at .  For follow up as well as any medication refills, we recommend that you follow up with your primary care physician.  A registered nurse will reach out to you by phone within a few days after your discharge to answer any additional questions that you may have after going home.      Sincerely,     Angel Kellogg PA-C

## 2024-03-22 NOTE — ANESTHESIA PREPROCEDURE EVALUATION
Procedure:  SANDHYA    Relevant Problems   CARDIO   (+) HTN (hypertension)   (+) New onset a-fib with RVR      GI/HEPATIC   (+) Gastroesophageal reflux disease      /RENAL   (+) ADILSON (acute kidney injury) (HCC)   (+) Renal calculus      NEURO/PSYCH   (+) Anxiety      Other   (+) Morbid obesity (HCC)        Physical Exam    Airway    Mallampati score: I  TM Distance: >3 FB  Neck ROM: full     Dental       Cardiovascular  Cardiovascular exam normal    Pulmonary  Pulmonary exam normal     Other Findings        Anesthesia Plan  ASA Score- 3     Anesthesia Type- IV sedation with anesthesia with ASA Monitors.         Additional Monitors:     Airway Plan:            Plan Factors-Exercise tolerance (METS): >4 METS.    Chart reviewed. EKG reviewed. Imaging results reviewed. Existing labs reviewed. Patient summary reviewed.                  Induction- intravenous.    Postoperative Plan- Plan for postoperative opioid use. Planned trial extubation    Informed Consent- Anesthetic plan and risks discussed with patient.  I personally reviewed this patient with the CRNA. Discussed and agreed on the Anesthesia Plan with the CRNA..

## 2024-03-22 NOTE — DISCHARGE SUMMARY
[facility]  Discharge- Maurilio Connors 1980, 43 y.o. male MRN: [id number]  Unit/Bed#: S -01 Encounter: [id number]  Primary Care Provider: Bello Sheikh DO   Date and time admitted to hospital: 3/21/2024 10:39 AM    * New onset a-fib with RVR  Assessment & Plan  Patient reported new palpitations in the middle of the night.  He was found to be in rapid A-fib with heart rates initially in the 160-180 range  No clear exacerbating factors such as thyroid disease, underlying heart disease, recent infection or alcohol use  S/p SANDHYA/DCCV - now in nsr  Start Lopressor 50 mg twice daily  Start Eliquis 5 mg twice daily  Outpatient cardiology follow-up  Recommend outpatient sleep study to assess for sleep apnea    Hypomagnesemia  Assessment & Plan  Mag 1.8 on admission  repleted    Morbid obesity (HCC)  Assessment & Plan  BMI >54--recommend weight loss    HTN (hypertension)  Assessment & Plan  dc lisinopril to avoid hypotension  Cont lopressor 50 mg bid  Followup with pcp      Medical Problems       Resolved Problems  Date Reviewed: 3/21/2024   None       Discharging Physician / Practitioner: Angel Kellogg PA-C  PCP: Bello Sheikh DO  Admission Date:   Admission Orders (From admission, onward)       Ordered        03/21/24 1257  Place in Observation  Once            03/21/24 1231  INPATIENT ADMISSION  Once                          Discharge Date: 03/22/24    Consultations During Hospital Stay:  Cardiology     Procedures Performed:   Cardioversion 3/22    Significant Findings / Test Results:   SANDHYA normal    Incidental Findings:   none     Test Results Pending at Discharge (will require follow up):   none     Outpatient Tests Requested:  Outpatient sleep study    Complications:  none    Reason for Admission: A-fib RVR    Hospital Course:   Maurilio Connors is a 43 y.o. male patient who originally presented to the hospital on 3/21/2024 due to palpitations at home.  Patient found  "to be in new onset A-fib with RVR.  Was seen in consultation by cardiology and underwent cardioversion on 3/22.  Normal sinus rhythm was restored.  Patient was started on Eliquis 5 mg twice daily, Lopressor 50 mg twice daily.  Patient will be discharged later today in stable condition with outpatient cardiology follow-up requested.    Please see above list of diagnoses and related plan for additional information.     Condition at Discharge: stable    Discharge Day Visit / Exam:   Subjective:  no overnight events.  Patient underwent cardioversion this morning, normal sinus rhythm  Vitals: Blood Pressure: 105/55 (03/22/24 0938)  Pulse: 79 (03/22/24 0915)  Temperature: (!) 97.4 °F (36.3 °C) (03/22/24 0915)  Temp Source: Temporal (03/22/24 0801)  Respirations: 18 (03/22/24 0915)  Height: 5' 11\" (180.3 cm) (03/22/24 0853)  Weight - Scale: (!) 177 kg (390 lb) (03/22/24 0853)  SpO2: 96 % (03/22/24 0915)  Exam:   Physical Exam  Vitals and nursing note reviewed.   Constitutional:       General: He is not in acute distress.     Appearance: Normal appearance. He is obese.   HENT:      Head: Normocephalic.      Mouth/Throat:      Mouth: Mucous membranes are moist.   Eyes:      Pupils: Pupils are equal, round, and reactive to light.   Cardiovascular:      Rate and Rhythm: Normal rate and regular rhythm.      Heart sounds: No murmur heard.  Pulmonary:      Effort: Pulmonary effort is normal. No respiratory distress.      Breath sounds: Normal breath sounds. No wheezing, rhonchi or rales.   Abdominal:      General: Bowel sounds are normal. There is no distension.      Palpations: Abdomen is soft.      Tenderness: There is no abdominal tenderness. There is no guarding.   Musculoskeletal:         General: No deformity.      Cervical back: Normal range of motion.      Right lower leg: No edema.      Left lower leg: No edema.   Skin:     Capillary Refill: Capillary refill takes less than 2 seconds.   Neurological:      General: No " focal deficit present.      Mental Status: He is alert and oriented to person, place, and time. Mental status is at baseline.          Discussion with Family: Patient declined call to .     Discharge instructions/Information to patient and family:   See after visit summary for information provided to patient and family.      Provisions for Follow-Up Care:  See after visit summary for information related to follow-up care and any pertinent home health orders.       Disposition:   Home    Planned Readmission: no     Discharge Statement:  I spent 45 minutes discharging the patient. This time was spent on the day of discharge. I had direct contact with the patient on the day of discharge. Greater than 50% of the total time was spent examining patient, answering all patient questions, arranging and discussing plan of care with patient as well as directly providing post-discharge instructions.  Additional time then spent on discharge activities.    Discharge Medications:  See after visit summary for reconciled discharge medications provided to patient and/or family.      **Please Note: This note may have been constructed using a voice recognition system**

## 2024-03-22 NOTE — CASE MANAGEMENT
Case Management Assessment & Discharge Planning Note    Patient name Maurilio Connors  Location S /S -01 MRN [id number]  : 1980 Date 3/22/2024       Current Admission Date: 3/21/2024  Current Admission Diagnosis:New onset a-fib with RVR   Patient Active Problem List    Diagnosis Date Noted    New onset a-fib with RVR 2024    Hypomagnesemia 2024    Gastroesophageal reflux disease 2022    Anxiety 2022    Morbid obesity (HCC) 2022    Left ureteral stone 2021    ADILSON (acute kidney injury) (HCC) 2021    Constipation 2021    SIRS (systemic inflammatory response syndrome) ([facility]) 2021    Renal calculus 2021    Urethral stricture 2021    Difficulty urinating 10/19/2021    HTN (hypertension) 10/19/2021      LOS (days): 1  Geometric Mean LOS (GMLOS) (days):   Days to GMLOS:     OBJECTIVE:              Current admission status: Observation       Preferred Pharmacy:   [id number] [place] - [address] [address]  Phone: [phone] Fax: [phone]    [id number] - [place] - [address]  [address]  [place] [address]  Phone: [phone] Fax: [phone]    [facility])  [place] - [address]  [address] [place] [address]  Phone: [phone] Fax: [phone]    Primary Care Provider: Bello Sheikh DO    Primary Insurance: [employer]  Secondary Insurance:     ASSESSMENT:  Active Health Care Proxies    There are no active Health Care Proxies on file.                      Patient Information  Admitted from:: Home  Mental Status: Alert  During Assessment patient was accompanied by: Not accompanied during assessment  Assessment information provided by:: Patient  Primary Caregiver: Self  Support Systems: Family members  County of Residence: [place]  What city do you live in?: [place]  Home entry access options.  Select all that apply.: No steps to enter home  Type of Current Residence: Apartment  Upon entering residence, is there a bedroom on the main floor (no further steps)?: Yes  Upon entering residence, is there a bathroom on the main floor (no further steps)?: Yes  Living Arrangements: Lives Alone    Activities of Daily Living Prior to Admission  Functional Status: Independent  Completes ADLs independently?: Yes  Ambulates independently?: Yes  Does patient currently have HHC?: No         Patient Information Continued  Does patient have prescription coverage?: Yes  Does patient receive dialysis treatments?: No  Does patient have a history of substance abuse?: No         Means of Transportation  Means of Transport to Appts:: Robin Chiang      Social Determinants of Health (SDOH)      Flowsheet Row Most Recent Value   Housing Stability    In the last 12 months, was there a time when you were not able to pay the mortgage or rent on time? N   In the last 12 months, how many places have you lived? 1   In the last 12 months, was there a time when you did not have a steady place to sleep or slept in a shelter (including now)? N   Transportation Needs    In the past 12 months, has lack of transportation kept you from medical appointments or from getting medications? no   In the past 12 months, has lack of transportation kept you from meetings, work, or from getting things needed for daily living? No   Food Insecurity    Within the past 12 months, you worried that your food would run out before you got the money to buy more. Never true   Within the past 12 months, the food you bought just didn't last and you didn't have money to get more. Never true   Utilities    In the past 12 months has the electric, gas, oil, or water company threatened to shut off services in your home? No            DISCHARGE DETAILS:    Discharge planning discussed with:: patient at bedside        CM contacted family/caregiver?:  (reports brother is  attending meeting at [facility] for mother and then coming to pick him up; declined offer to reach out to family)  Were Treatment Team discharge recommendations reviewed with patient/caregiver?: Yes  Did patient/caregiver verbalize understanding of patient care needs?: Yes  Were patient/caregiver advised of the risks associated with not following Treatment Team discharge recommendations?: Yes         Requested Home Health Care         Is the patient interested in HHC at discharge?: No    DME Referral Provided  Referral made for DME?: No    Other Referral/Resources/Interventions Provided:  Interventions: Prescription Price Check  Referral Comments: TT received from [facility] [place] requesting price check for Eliquis and relaying patient to be d/c'd today and requesting assistance with transportation to out-patient appointments. Call made to pharmacy and they relayed Eliquis is covered at 100%, but they do not have it in stock - will have to order for Monday delivery. [facility] to [facility] to [facility]. Met with patient at bedside to complete assessment and discuss same. Patient reports he lives in [facility] in [place]; not steps to enter. Patient reports he's independent with ADLs and ambulation. Uses [employer] for transportation, but reports they are not consistent/on time. Discussed [facility] special needs program, but he reports he's already contacted them, and they require [employer] to be tried first. Relayed to him that these would be only free transportation programs available to him; patient understanding of same. Patient aware that Eliquis ordered to [facility], but not in stock - order sent to in-house pharmacy, and had been tubed to floor - Eliquis given to him to take home. Aware that other medications sent to [facility]. Patient aware of d/c for today and reports brother will pick him up after 2:00pm. Offer made to send him earlier by Ramesh, but patient declined - relaying preference to wait for family.    Would you like to  participate in our [facility] service program?  : No - Declined    Treatment Team Recommendation: Home  Discharge Destination Plan:: Home  Transport at Discharge : Family

## 2024-03-22 NOTE — ANESTHESIA POSTPROCEDURE EVALUATION
Post-Op Assessment Note    CV Status:  Stable  Pain Score: 0    Pain management: adequate       Mental Status:  Alert and awake   Hydration Status:  Euvolemic   PONV Controlled:  Controlled   Airway Patency:  Patent     Post Op Vitals Reviewed: Yes    No anethesia notable event occurred.    Staff: Anesthesiologist, CRNA               /55   Temp       HR 75   Resp   20   SpO2   95% RA

## 2024-03-22 NOTE — UTILIZATION REVIEW
Initial Clinical Review    Admission: Date/Time/Statement:   Admission Orders (From admission, onward)       Ordered        03/21/24 1257  Place in Observation  Once            03/21/24 1231  INPATIENT ADMISSION  Once                          Orders Placed This Encounter   Procedures    INPATIENT ADMISSION     Standing Status:   Standing     Number of Occurrences:   1     Order Specific Question:   Level of Care     Answer:   Med Surg [16]     Order Specific Question:   Estimated length of stay     Answer:   More than 2 Midnights     Order Specific Question:   Certification     Answer:   I certify that inpatient services are medically necessary for this patient for a duration of greater than two midnights. See H&P and MD Progress Notes for additional information about the patient's course of treatment.    Place in Observation     Standing Status:   Standing     Number of Occurrences:   1     Order Specific Question:   Level of Care     Answer:   Med Surg [16]     ED Arrival Information       Expected   -    Arrival   3/21/2024 10:39    Acuity   Urgent              Means of arrival   Ambulance    Escorted by   [facility]    Service   Hospitalist    Admission type   Emergency              Arrival complaint   EMS/Palpitations             Chief Complaint   Patient presents with    Palpitations     Pt arrives via EMS. Pt reports waking up at 4 am with palpitations, took a tylenol without relief. Pt denies CP/SOB. EMS reports administering 20 mg Cardizem in ambulance after seeing HR in 160-180's.       Initial Presentation: 43 y.o. male with a PMH of morbid obesity and hypertension who presents with palpitations in the middle of the night.  He is feeling a little better now. Patient reports that occasionally he has feelings of skipped heartbeats in the past but this was different. He has chronic sense of dyspnea with ambulating due to his obesity but no change in symptoms recently and no chest pain or pressure. Nor any  dizziness or lightheadedness. He denies any alcohol use, nor does he drink caffeine. He has no prior underlying history of heart disease or thyroid disease. Plan: Observation for new onset Afib with RVR, HTN, hypomagnesemia, morbid obesity: Cardizem drip, Cardiology consult, Echo, telemetry, hold lisinopril, replete Mg and recheck.     Cardiology consult: Normal TSH, await echo considering young age and new onset atrial fibrillation, will perform SANDHYA-cardioversion tomorrow. Recommend AV magdalena blocker for rate control. Will start lopressor 25mg every 6 hours. Wean off IV cardizem for HR <100. Anticoagulation with Eliquis. Outpatient evaluation for sleep apnea and treatment if feasible and agreeable.     ED Triage Vitals   Temperature Pulse Respirations Blood Pressure SpO2   03/21/24 1046 03/21/24 1046 03/21/24 1046 03/21/24 1046 03/21/24 1046   98.2 °F (36.8 °C) 105 18 126/60 100 %      Temp Source Heart Rate Source Patient Position - Orthostatic VS BP Location FiO2 (%)   03/21/24 1046 03/21/24 1046 03/21/24 1046 03/21/24 1046 --   Oral Monitor Sitting Left arm       Pain Score       03/21/24 1352       No Pain          Wt Readings from Last 1 Encounters:   03/21/24 (!) 177 kg (390 lb 3.4 oz)     Additional Vital Signs:     Date/Time Temp Pulse Resp BP MAP (mmHg) SpO2 O2 Device   03/22/24 0801 97.6 °F (36.4 °C) 138 Abnormal  18 146/72 -- 98 % None (Room air)   03/22/24 03:56:42 -- 94 -- 110/75 87 96 % --   03/21/24 2242 -- 81 -- 116/82 -- -- --   03/21/24 22:30:16 -- 71 17 108/70 83 94 % --   03/21/24 20:23:02 98.4 °F (36.9 °C) 92 -- 105/62 76 94 % --   03/21/24 18:49:21 -- 75 -- 112/68 83 92 % --   03/21/24 16:15:30 98.1 °F (36.7 °C) 91 18 100/62 75 94 % --   03/21/24 1514 -- 104 -- 119/77 -- -- --   03/21/24 13:42:10 97.6 °F (36.4 °C) 99 -- 132/70 91 97 % --   03/21/24 1247 -- 104 18 106/69 84 95 % None (Room air)   03/21/24 1232 -- 101 18 101/52 72 96 % None (Room air)   03/21/24 1217 -- 102 18 107/51 72 96 % None  (Room air)   03/21/24 1211 -- 124 Abnormal  18 127/59 81 97 % None (Room air)   03/21/24 1202 -- 123 Abnormal  -- 130/59 -- -- --   03/21/24 1147 -- 127 Abnormal  -- 147/64 -- -- --   03/21/24 1132 -- 133 Abnormal  -- 143/64 -- -- --   03/21/24 1117 -- 120 Abnormal  -- 130/60 -- -- --   03/21/24 1047 -- -- -- -- -- -- None (Room air)     Pertinent Labs/Diagnostic Test Results:     3/22 EKG:  Atrial fibrillation with rapid ventricular response  Premature ventricular complexes  Abnormal ECG      Results from last 7 days   Lab Units 03/21/24  1349 03/21/24  1105   WBC Thousand/uL  --  9.92   HEMOGLOBIN g/dL  --  13.8   HEMATOCRIT %  --  42.8   PLATELETS Thousands/uL 221 215   NEUTROS ABS Thousands/µL  --  7.83*         Results from last 7 days   Lab Units 03/21/24  1116   SODIUM mmol/L 139   POTASSIUM mmol/L 3.8   CHLORIDE mmol/L 103   CO2 mmol/L 29   ANION GAP mmol/L 7   BUN mg/dL 15   CREATININE mg/dL 0.69   EGFR ml/min/1.73sq m 114   CALCIUM mg/dL 9.5   MAGNESIUM mg/dL 1.8*     Results from last 7 days   Lab Units 03/21/24  1116   AST U/L 15   ALT U/L 13   ALK PHOS U/L 34   TOTAL PROTEIN g/dL 7.4   ALBUMIN g/dL 4.2   TOTAL BILIRUBIN mg/dL 0.41         Results from last 7 days   Lab Units 03/21/24  1116   GLUCOSE RANDOM mg/dL 98           Results from last 7 days   Lab Units 03/21/24  1520 03/21/24  1349 03/21/24  1105   HS TNI 0HR ng/L  --   --  15   HS TNI 2HR ng/L  --  14  --    HSTNI D2 ng/L  --  -1  --    HS TNI 4HR ng/L 15  --   --    HSTNI D4 ng/L 0  --   --          Results from last 7 days   Lab Units 03/21/24  1105   PROTIME seconds 14.3   INR  1.05   PTT seconds 29     Results from last 7 days   Lab Units 03/21/24  1105   TSH 3RD GENERATON uIU/mL 1.379         ED Treatment:   Medication Administration from 03/21/2024 1039 to 03/21/2024 1310         Date/Time Order Dose Route Action     03/21/2024 1116 EDT sodium chloride 0.9 % bolus 1,000 mL 1,000 mL Intravenous New Bag     03/21/2024 1117 EDT diltiazem  (CARDIZEM) 125 mg in sodium chloride 0.9 % 125 mL infusion 5 mg/hr Intravenous New Bag     03/21/2024 1207 EDT magnesium sulfate 2 g/50 mL IVPB (premix) 2 g 2 g Intravenous New Bag     03/21/2024 1212 EDT diltiazem (CARDIZEM) injection 15 mg 15 mg Intravenous Given          Past Medical History:   Diagnosis Date    ADILSON (acute kidney injury) ([facility])     Anxiety     GERD (gastroesophageal reflux disease)     Hypertension     Kidney stone     Murmur, cardiac     Stress incontinence     Urethral stricture     Use of cane as ambulatory aid     as needed    Wears glasses      Present on Admission:   Morbid obesity ([facility])   HTN (hypertension)      Admitting Diagnosis: Palpitations [R00.2]  Hypomagnesemia [E83.42]  Atrial fibrillation with RVR ([facility]) [I48.91]  Age/Sex: 43 y.o. male  Admission Orders:  Scheduled Medications:  apixaban, 5 mg, Oral, BID  metoprolol tartrate, 25 mg, Oral, Q6H  pantoprazole, 40 mg, Oral, Early Morning      Continuous IV Infusions:     PRN Meds:  acetaminophen, 650 mg, Oral, Q6H PRN  docusate sodium, 100 mg, Oral, BID PRN        IP CONSULT TO CARDIOLOGY  IP CONSULT TO [facility] CARE    Network Utilization Review Department  ATTENTION: Please call with any questions or concerns to [phone] and carefully listen to the prompts so that you are directed to the right person. All voicemails are confidential.   For Discharge needs, contact Care Management DC Support Team at [phone] opt. [phone]  Send all requests for admission clinical reviews, approved or denied determinations and any other requests to dedicated fax number below belonging to the campus where the patient is receiving treatment. List of dedicated fax numbers for the Facilities:  FACILITY NAME UR FAX NUMBER   ADMISSION DENIALS (Administrative/Medical Necessity) [phone]   DISCHARGE SUPPORT TEAM (NETWORK) [phone]   PARENT CHILD HEALTH (Maternity/NICU/Pediatrics) [phone]   [facility] [phone]   [facility]  [facility] [phone]   [facility] [phone]   [facility] [phone]   Critical access hospital [phone]   [facility] [phone]   [facility] [phone]   [facility] [phone]   [facility] [phone]   [facility] [phone]   [facility] [phone]   [facility] [place] [phone]

## 2024-03-31 NOTE — PROGRESS NOTES
Cardiology  Hospital Follow Up   Office Visit Note  Maurilio Connors   43 y.o.   male   MRN: [id number]  [facility] [place]  [address] [facility] [address]  [address]  [place] [address] [phone]    PCP: Bello Sheikh DO  Cardiologist: Will be Dr. Brownlee          Summary of recommendations  Outpatient sleep study   Switch to Toprol 50 mg q12, once complete metoprolol tartrate  At this time, he declined a referral to weight management.  He is contemplative.  He has not been to bariatrics in the past.  Follow up will be scheduled with Dr Brownlee 3 months      Assessment/plan  New onset A-fib with RVR.  Recent hospital adm, 3/21/2024  S/P SANDHYA/DCCV 3/22/24--> NSR with one 300J shock  Rate control with metoprolol tartrate 50 mg every 12.  Switch to succinate when finished his current RX  OAC with Eliquis 5 mg twice daily  EKG today NSR 74 bpm  Hypertension, essential.  /69-on metoprolol tartrate 50 mg every 12.  Previously on lisinopril, DC'd in favor of an AV magdalena agent.  Did not take am meds yet.  Continue to monitor.  He is going to switch the timing of his medications and take them earlier in the morning.  Hypertriglyceridemia.  Due for reassessment.  Will also  obtain a hemoglobin A1c given HTG   Latest Reference Range & Units 09/05/15 08:04 08/17/16 07:02 05/01/17 07:05   Cholesterol 125 - 200 mg/dL 137 143 134   Triglycerides <150 mg/dL 179 (H) 188 (H) 247 (H)   HDL > OR = 40 mg/dL 33 (L) 33 (L) 28 (L)   Non-HDL Cholesterol mg/dL (calc) 104 110 106   LDL CHOLESTEROL <130 mg/dL (calc) 68 72 57   Chol/HDL Ratio < OR = 5.0 (calc) 4.2 4.3 4.8   COVID-19 12/2023  Morbid obesity BMI 54.  At this time he declines a referral to weight management, to assist with weight loss.  He is contemplative however.  He understands he needs to make better food choices.  He eats a lot of processed foods  Wt Readings from Last 3 Encounters:   04/01/24 (!) 176 kg (387 lb 3.2 oz)   03/22/24 (!) 177 kg  (390 lb)   02/23/22 (!) 191 kg (420 lb)   Cardiac testing  SANDHYA 3/22/24.  EF 65%.  Wall motion normal.  RV normal size and function.  No LA thrombus.  No PFO.                HPI   Maurilio Connors is a 43-year-old male with essential hypertension, hypertriglyceridemia and morbid obesity, BMI 54.  He does not follow with cardiology.  In December 2023 his lipids showed an improved triglyceride level of 104.  Total cholesterol is 126, calculated LDL 73.  He underwent urological surgery in 2022.  There were no reported issues with anesthesia    Social history: He is living with his mother and assisting caring for her.  He is not actively working since 2013.  He is sedentary.  He does not use tobacco.  He does not drink EtOH.  No illicit drug use.    Adm 3/21-3/22/24  CC: Palpitations, awakening him at 4 AM  EKG: A-fib with RVR  Followed by Cardiology  Minimally active at baseline.  Poor historian.  TSH normal; BMP normal.  Magnesium 1.8.  Troponin 15.  H&H were normal.  Underwent SANDHYA guided cardioversion, successful restoring normal sinus rhythm with a single biphasic 300 J shock  Started on Metroprolol tartrate 50 mg twice daily.  Lisinopril was discontinued in favor of the AV magdalena agent  Started on Eliquis 5 mg twice daily  An outpatient sleep study was recommended to assess for BRITTON  Referral to weight management recommended    4/1/24  Hospital follow-up new onset A-fib  PMH: HTN.  Morbid obesity.  ROS: He denies chest pain, shortness of breath or palpitations.  He is chronically fatigued.  He was fatigued even before he went into the hospital and was treated with a beta-blocker.  EKG normal sinus rhythm 74 bpm.  /69  He is adherent to his medical therapy  We discussed what atrial fibrillation is.  He was provided educational handouts.  We discussed possible contributors: Undiagnosed sleep apnea, obesity.  He declined a referral to bariatrics for assistance with weight loss.  He is contemplative however  He agrees  to referral to sleep medicine.  He has palpitations, morbid obesity, PAF.  Palpitations waking him from sleep.  He has chronic daytime somnolence/fatigue even before beginning the beta-blocker  We will continue the current plan.  I recommend he alter the timing of his medications, he is in agreement.  He did not take his medications today.  He will be following with his PCP.  If his blood pressure remains elevated, would have a low threshold for adding back lisinopril          Assessment  Diagnoses and all orders for this visit:    New onset a-fib with RVR  -     POCT ECG  -     Ambulatory Referral to Sleep Medicine; Future  -     metoprolol succinate (TOPROL-XL) 50 mg 24 hr tablet; Take 1 tablet (50 mg total) by mouth daily    Morbid obesity (HCC)  -     Ambulatory Referral to Sleep Medicine; Future    Paroxysmal atrial fibrillation (HCC)  -     apixaban (ELIQUIS) 5 mg; Take 1 tablet (5 mg total) by mouth 2 (two) times a day    Mixed hyperlipidemia  -     Lipid Panel With Direct LDL; Future  -     Hemoglobin A1C; Future    Primary hypertension          Past Medical History:   Diagnosis Date    ADILSON (acute kidney injury) (HCC)     Anxiety     GERD (gastroesophageal reflux disease)     Hypertension     Kidney stone     Murmur, cardiac     Stress incontinence     Urethral stricture     Use of cane as ambulatory aid     as needed    Wears glasses        Review of Systems   Constitutional: Negative for chills.   Cardiovascular:  Negative for chest pain, claudication, cyanosis, dyspnea on exertion, irregular heartbeat, leg swelling, near-syncope, orthopnea, palpitations, paroxysmal nocturnal dyspnea and syncope.   Respiratory:  Positive for sleep disturbances due to breathing. Negative for cough and shortness of breath.         Daytime somnolence   Gastrointestinal:  Negative for heartburn and nausea.   Neurological:  Negative for dizziness, focal weakness, headaches, light-headedness and weakness.   All other systems  reviewed and are negative.      No Known Allergies  .    Current Outpatient Medications:     acetaminophen (TYLENOL) 325 mg tablet, Take 2 tablets (650 mg total) by mouth every 6 (six) hours as needed for mild pain, Disp: , Rfl: 0    apixaban (ELIQUIS) 5 mg, Take 1 tablet (5 mg total) by mouth 2 (two) times a day, Disp: 60 tablet, Rfl: 4    docusate sodium (COLACE) 100 mg capsule, Take 1 capsule (100 mg total) by mouth as needed for constipation, Disp: , Rfl:     metoprolol succinate (TOPROL-XL) 50 mg 24 hr tablet, Take 1 tablet (50 mg total) by mouth daily, Disp: 60 tablet, Rfl: 4    omeprazole (PriLOSEC) 20 mg delayed release capsule, Take 1 capsule (20 mg total) by mouth daily, Disp: 30 capsule, Rfl: 3        Social History     Socioeconomic History    Marital status: Single     Spouse name: Not on file    Number of children: Not on file    Years of education: Not on file    Highest education level: Not on file   Occupational History    Not on file   Tobacco Use    Smoking status: Former     Types: Cigarettes    Smokeless tobacco: Never   Vaping Use    Vaping status: Never Used   Substance and Sexual Activity    Alcohol use: Not Currently    Drug use: Never    Sexual activity: Not Currently   Other Topics Concern    Not on file   Social History Narrative    Not on file     Social Determinants of Health     Financial Resource Strain: Not on file   Food Insecurity: No Food Insecurity (3/22/2024)    Hunger Vital Sign     Worried About Running Out of Food in the Last Year: Never true     Ran Out of Food in the Last Year: Never true   Transportation Needs: No Transportation Needs (3/22/2024)    PRAPARE - Transportation     Lack of Transportation (Medical): No     Lack of Transportation (Non-Medical): No   Physical Activity: Not on file   Stress: Not on file   Social Connections: Not on file   Intimate Partner Violence: Not on file   Housing Stability: Low Risk  (3/22/2024)    Housing Stability Vital Sign     Unable to  "Pay for Housing in the Last Year: No     Number of Places Lived in the Last Year: 1     Unstable Housing in the Last Year: No       History reviewed. No pertinent family history.    Physical Exam  Vitals and nursing note reviewed.   Constitutional:       General: He is not in acute distress.     Appearance: He is obese.   HENT:      Head: Normocephalic and atraumatic.   Eyes:      Extraocular Movements: Extraocular movements intact.      Conjunctiva/sclera: Conjunctivae normal.   Cardiovascular:      Rate and Rhythm: Normal rate and regular rhythm.      Pulses: Intact distal pulses.      Heart sounds: Normal heart sounds.   Pulmonary:      Effort: Pulmonary effort is normal.      Breath sounds: Decreased breath sounds present.   Abdominal:      General: Bowel sounds are normal.      Palpations: Abdomen is soft.   Musculoskeletal:         General: Normal range of motion.      Cervical back: Normal range of motion and neck supple.   Skin:     General: Skin is warm and dry.   Neurological:      Mental Status: He is alert and oriented to person, place, and time.   Psychiatric:         Mood and Affect: Mood normal.         Vitals: Blood pressure 156/69, pulse 74, height 5' 11\" (1.803 m), weight (!) 176 kg (387 lb 3.2 oz), SpO2 99%.   Wt Readings from Last 3 Encounters:   04/01/24 (!) 176 kg (387 lb 3.2 oz)   03/22/24 (!) 177 kg (390 lb)   02/23/22 (!) 191 kg (420 lb)         Labs & Results:  Lab Results   Component Value Date    WBC 9.92 03/21/2024    HGB 13.8 03/21/2024    HCT 42.8 03/21/2024    MCV 87 03/21/2024     03/21/2024     No results found for: \"BNP\"  No components found for: \"CHEM\"  Total CK   Date Value Ref Range Status   11/24/2021 184.6 49 - 397 U/L Final   11/15/2017 197 (H) 44 - 196 U/L Final     Comment:     Performed at: [facility], [address] [address]       CK-MB Index   Date Value Ref Range Status   11/24/2021 1.2 0.0 - 2.5 " % Final     No results found for this or any previous visit.    No results found for this or any previous visit.      This note was completed in part utilizing [employer] direct voice recognition software.   Grammatical errors, random word insertion, spelling mistakes, and incomplete sentences may be an occasional consequence of the system secondary to software limitations, ambient noise and hardware issues. At the time of dictation, efforts were made to edit, clarify and /or correct errors.  Please read the chart carefully and recognize, using context, where substitutions have occurred.  If you have any questions or concerns about the context, text or information contained within the body of this dictation, please contact myself, the provider, for further clarification

## 2024-04-01 ENCOUNTER — OFFICE VISIT (OUTPATIENT)
Dept: CARDIOLOGY CLINIC | Facility: CLINIC | Age: 44
End: 2024-04-01
Payer: COMMERCIAL

## 2024-04-01 ENCOUNTER — TELEPHONE (OUTPATIENT)
Dept: CARDIOLOGY CLINIC | Facility: CLINIC | Age: 44
End: 2024-04-01

## 2024-04-01 VITALS
DIASTOLIC BLOOD PRESSURE: 69 MMHG | OXYGEN SATURATION: 99 % | HEART RATE: 74 BPM | SYSTOLIC BLOOD PRESSURE: 156 MMHG | WEIGHT: 315 LBS | HEIGHT: 71 IN | BODY MASS INDEX: 44.1 KG/M2

## 2024-04-01 DIAGNOSIS — I48.0 PAROXYSMAL ATRIAL FIBRILLATION (HCC): ICD-10-CM

## 2024-04-01 DIAGNOSIS — I10 PRIMARY HYPERTENSION: ICD-10-CM

## 2024-04-01 DIAGNOSIS — E66.01 MORBID OBESITY (HCC): ICD-10-CM

## 2024-04-01 DIAGNOSIS — I48.91 NEW ONSET A-FIB (HCC): Primary | ICD-10-CM

## 2024-04-01 DIAGNOSIS — E78.2 MIXED HYPERLIPIDEMIA: ICD-10-CM

## 2024-04-01 PROCEDURE — 93000 ELECTROCARDIOGRAM COMPLETE: CPT | Performed by: NURSE PRACTITIONER

## 2024-04-01 PROCEDURE — 99215 OFFICE O/P EST HI 40 MIN: CPT | Performed by: NURSE PRACTITIONER

## 2024-04-01 RX ORDER — METOPROLOL SUCCINATE 50 MG/1
50 TABLET, EXTENDED RELEASE ORAL DAILY
Qty: 60 TABLET | Refills: 4 | Status: SHIPPED | OUTPATIENT
Start: 2024-04-01

## 2024-04-01 NOTE — LETTER
April 1, 2024     Bello Sheikh DO  [address] [place] [address]    Patient: Maurilio Connors   YOB: 1980   Date of Visit: 4/1/2024       Dear Dr. Sheikh:    Thank you for referring Maurilio Connors to me for evaluation. Below are my notes for this consultation.    If you have questions, please do not hesitate to call me. I look forward to following your patient along with you.         Sincerely,        JANINE Vallecillo        CC: MD Polly Ortega CRNP  4/1/2024 10:19 AM  Sign when Signing Visit  Cardiology  Hospital Follow Up   Office Visit Note  Maurilio Connors   43 y.o.   male   MRN: [id number]  [facility] [place]  [address]  [address]  [place] [address]  [phone]    PCP: Bello Sheikh DO  Cardiologist: Will be Dr. Brownlee          Summary of recommendations  Outpatient sleep study   Switch to Toprol 50 mg q12, once complete metoprolol tartrate  At this time, he declined a referral to weight management.  He is contemplative.  He has not been to bariatrics in the past.  Follow up will be scheduled with Dr Brownlee 3 months      Assessment/plan  New onset A-fib with RVR.  Recent hospital adm, 3/21/2024  S/P SANDHYA/DCCV 3/22/24--> NSR with one 300J shock  Rate control with metoprolol tartrate 50 mg every 12.  Switch to succinate when finished his current RX  OAC with Eliquis 5 mg twice daily  EKG today NSR 74 bpm  Hypertension, essential.  /69-on metoprolol tartrate 50 mg every 12.  Previously on lisinopril, DC'd in favor of an AV magdalena agent.  Did not take am meds yet.  Continue to monitor.  He is going to switch the timing of his medications and take them earlier in the morning.  Hypertriglyceridemia.  Due for reassessment.  Will also  obtain a hemoglobin A1c given HTG   Latest Reference Range & Units 09/05/15 08:04 08/17/16 07:02 05/01/17 07:05   Cholesterol 125 - 200 mg/dL 137 143 134   Triglycerides  <150 mg/dL 179 (H) 188 (H) 247 (H)   HDL > OR = 40 mg/dL 33 (L) 33 (L) 28 (L)   Non-HDL Cholesterol mg/dL (calc) 104 110 106   LDL CHOLESTEROL <130 mg/dL (calc) 68 72 57   Chol/HDL Ratio < OR = 5.0 (calc) 4.2 4.3 4.8   COVID-19 12/2023  Morbid obesity BMI 54.  At this time he declines a referral to weight management, to assist with weight loss.  He is contemplative however.  He understands he needs to make better food choices.  He eats a lot of processed foods  Wt Readings from Last 3 Encounters:   04/01/24 (!) 176 kg (387 lb 3.2 oz)   03/22/24 (!) 177 kg (390 lb)   02/23/22 (!) 191 kg (420 lb)   Cardiac testing  SANDHYA 3/22/24.  EF 65%.  Wall motion normal.  RV normal size and function.  No LA thrombus.  No PFO.                HPI   Maurilio Connors is a 43-year-old male with essential hypertension, hypertriglyceridemia and morbid obesity, BMI 54.  He does not follow with cardiology.  In December 2023 his lipids showed an improved triglyceride level of 104.  Total cholesterol is 126, calculated LDL 73.  He underwent urological surgery in 2022.  There were no reported issues with anesthesia    Social history: He is living with his mother and assisting caring for her.  He is not actively working since 2013.  He is sedentary.  He does not use tobacco.  He does not drink EtOH.  No illicit drug use.    Adm 3/21-3/22/24  CC: Palpitations, awakening him at 4 AM  EKG: A-fib with RVR  Followed by Cardiology  Minimally active at baseline.  Poor historian.  TSH normal; BMP normal.  Magnesium 1.8.  Troponin 15.  H&H were normal.  Underwent SANDHYA guided cardioversion, successful restoring normal sinus rhythm with a single biphasic 300 J shock  Started on Metroprolol tartrate 50 mg twice daily.  Lisinopril was discontinued in favor of the AV magdalena agent  Started on Eliquis 5 mg twice daily  An outpatient sleep study was recommended to assess for BRITTON  Referral to weight management recommended    4/1/24  Hospital follow-up new onset  A-fib  PMH: HTN.  Morbid obesity.  ROS: He denies chest pain, shortness of breath or palpitations.  He is chronically fatigued.  He was fatigued even before he went into the hospital and was treated with a beta-blocker.  EKG normal sinus rhythm 74 bpm.  /69  He is adherent to his medical therapy  We discussed what atrial fibrillation is.  He was provided educational handouts.  We discussed possible contributors: Undiagnosed sleep apnea, obesity.  He declined a referral to bariatrics for assistance with weight loss.  He is contemplative however  He agrees to referral to sleep medicine.  He has palpitations, morbid obesity, PAF.  Palpitations waking him from sleep.  He has chronic daytime somnolence/fatigue even before beginning the beta-blocker  We will continue the current plan.  I recommend he alter the timing of his medications, he is in agreement.  He did not take his medications today.  He will be following with his PCP.  If his blood pressure remains elevated, would have a low threshold for adding back lisinopril          Assessment  Diagnoses and all orders for this visit:    New onset a-fib with RVR  -     POCT ECG  -     Ambulatory Referral to Sleep Medicine; Future  -     metoprolol succinate (TOPROL-XL) 50 mg 24 hr tablet; Take 1 tablet (50 mg total) by mouth daily    Morbid obesity (HCC)  -     Ambulatory Referral to Sleep Medicine; Future    Paroxysmal atrial fibrillation (HCC)  -     apixaban (ELIQUIS) 5 mg; Take 1 tablet (5 mg total) by mouth 2 (two) times a day    Mixed hyperlipidemia  -     Lipid Panel With Direct LDL; Future  -     Hemoglobin A1C; Future    Primary hypertension          Past Medical History:   Diagnosis Date   • ADILSON (acute kidney injury) ([facility])    • Anxiety    • GERD (gastroesophageal reflux disease)    • Hypertension    • Kidney stone    • Murmur, cardiac    • Stress incontinence    • Urethral stricture    • Use of cane as ambulatory aid     as needed   • Wears glasses         Review of Systems   Constitutional: Negative for chills.   Cardiovascular:  Negative for chest pain, claudication, cyanosis, dyspnea on exertion, irregular heartbeat, leg swelling, near-syncope, orthopnea, palpitations, paroxysmal nocturnal dyspnea and syncope.   Respiratory:  Positive for sleep disturbances due to breathing. Negative for cough and shortness of breath.         Daytime somnolence   Gastrointestinal:  Negative for heartburn and nausea.   Neurological:  Negative for dizziness, focal weakness, headaches, light-headedness and weakness.   All other systems reviewed and are negative.      No Known Allergies  .    Current Outpatient Medications:   •  acetaminophen (TYLENOL) 325 mg tablet, Take 2 tablets (650 mg total) by mouth every 6 (six) hours as needed for mild pain, Disp: , Rfl: 0  •  apixaban (ELIQUIS) 5 mg, Take 1 tablet (5 mg total) by mouth 2 (two) times a day, Disp: 60 tablet, Rfl: 4  •  docusate sodium (COLACE) 100 mg capsule, Take 1 capsule (100 mg total) by mouth as needed for constipation, Disp: , Rfl:   •  metoprolol succinate (TOPROL-XL) 50 mg 24 hr tablet, Take 1 tablet (50 mg total) by mouth daily, Disp: 60 tablet, Rfl: 4  •  omeprazole (PriLOSEC) 20 mg delayed release capsule, Take 1 capsule (20 mg total) by mouth daily, Disp: 30 capsule, Rfl: 3        Social History     Socioeconomic History   • Marital status: Single     Spouse name: Not on file   • Number of children: Not on file   • Years of education: Not on file   • Highest education level: Not on file   Occupational History   • Not on file   Tobacco Use   • Smoking status: Former     Types: Cigarettes   • Smokeless tobacco: Never   Vaping Use   • Vaping status: Never Used   Substance and Sexual Activity   • Alcohol use: Not Currently   • Drug use: Never   • Sexual activity: Not Currently   Other Topics Concern   • Not on file   Social History Narrative   • Not on file     Social Determinants of Health     Financial Resource  "Strain: Not on file   Food Insecurity: No Food Insecurity (3/22/2024)    Hunger Vital Sign    • Worried About Running Out of Food in the Last Year: Never true    • Ran Out of Food in the Last Year: Never true   Transportation Needs: No Transportation Needs (3/22/2024)    PRAPARE - Transportation    • Lack of Transportation (Medical): No    • Lack of Transportation (Non-Medical): No   Physical Activity: Not on file   Stress: Not on file   Social Connections: Not on file   Intimate Partner Violence: Not on file   Housing Stability: Low Risk  (3/22/2024)    Housing Stability Vital Sign    • Unable to Pay for Housing in the Last Year: No    • Number of Places Lived in the Last Year: 1    • Unstable Housing in the Last Year: No       History reviewed. No pertinent family history.    Physical Exam  Vitals and nursing note reviewed.   Constitutional:       General: He is not in acute distress.     Appearance: He is obese.   HENT:      Head: Normocephalic and atraumatic.   Eyes:      Extraocular Movements: Extraocular movements intact.      Conjunctiva/sclera: Conjunctivae normal.   Cardiovascular:      Rate and Rhythm: Normal rate and regular rhythm.      Pulses: Intact distal pulses.      Heart sounds: Normal heart sounds.   Pulmonary:      Effort: Pulmonary effort is normal.      Breath sounds: Decreased breath sounds present.   Abdominal:      General: Bowel sounds are normal.      Palpations: Abdomen is soft.   Musculoskeletal:         General: Normal range of motion.      Cervical back: Normal range of motion and neck supple.   Skin:     General: Skin is warm and dry.   Neurological:      Mental Status: He is alert and oriented to person, place, and time.   Psychiatric:         Mood and Affect: Mood normal.         Vitals: Blood pressure 156/69, pulse 74, height 5' 11\" (1.803 m), weight (!) 176 kg (387 lb 3.2 oz), SpO2 99%.   Wt Readings from Last 3 Encounters:   04/01/24 (!) 176 kg (387 lb 3.2 oz)   03/22/24 (!) 177 " "kg (390 lb)   02/23/22 (!) 191 kg (420 lb)         Labs & Results:  Lab Results   Component Value Date    WBC 9.92 03/21/2024    HGB 13.8 03/21/2024    HCT 42.8 03/21/2024    MCV 87 03/21/2024     03/21/2024     No results found for: \"BNP\"  No components found for: \"CHEM\"  Total CK   Date Value Ref Range Status   11/24/2021 184.6 49 - 397 U/L Final   11/15/2017 197 (H) 44 - 196 U/L Final     Comment:     Performed at: [facility]  [facility] MD, [address] [address]       CK-MB Index   Date Value Ref Range Status   11/24/2021 1.2 0.0 - 2.5 % Final     No results found for this or any previous visit.    No results found for this or any previous visit.      This note was completed in part utilizing [employer] direct voice recognition software.   Grammatical errors, random word insertion, spelling mistakes, and incomplete sentences may be an occasional consequence of the system secondary to software limitations, ambient noise and hardware issues. At the time of dictation, efforts were made to edit, clarify and /or correct errors.  Please read the chart carefully and recognize, using context, where substitutions have occurred.  If you have any questions or concerns about the context, text or information contained within the body of this dictation, please contact myself, the provider, for further clarification            "

## 2024-04-01 NOTE — TELEPHONE ENCOUNTER
Patient called to ask when he should take his Eliquis. Patient stated he took his first dose of Eliquis at 11 am today and advised to take second dose around 11 pm or before bed.     Patient's states he was advised at visit today to begin taking his metoprolol at 7 am starting tomorrow. Advised over the phone to take Eliquis at 7 am and 7 pm going forward.

## 2024-04-02 DIAGNOSIS — I48.91 NEW ONSET A-FIB (HCC): Primary | ICD-10-CM

## 2024-04-02 DIAGNOSIS — E66.01 CLASS 3 SEVERE OBESITY DUE TO EXCESS CALORIES WITH BODY MASS INDEX (BMI) OF 50.0 TO 59.9 IN ADULT, UNSPECIFIED WHETHER SERIOUS COMORBIDITY PRESENT (HCC): ICD-10-CM

## 2024-06-14 ENCOUNTER — TELEPHONE (OUTPATIENT)
Dept: LAB | Facility: HOSPITAL | Age: 44
End: 2024-06-14

## 2024-06-26 ENCOUNTER — APPOINTMENT (OUTPATIENT)
Dept: LAB | Facility: HOSPITAL | Age: 44
End: 2024-06-26

## 2024-06-26 DIAGNOSIS — E78.2 MIXED HYPERLIPIDEMIA: ICD-10-CM

## 2024-07-02 ENCOUNTER — OFFICE VISIT (OUTPATIENT)
Dept: CARDIOLOGY CLINIC | Facility: MEDICAL CENTER | Age: 44
End: 2024-07-02
Payer: COMMERCIAL

## 2024-07-02 ENCOUNTER — TELEPHONE (OUTPATIENT)
Dept: LAB | Facility: HOSPITAL | Age: 44
End: 2024-07-02

## 2024-07-02 VITALS
DIASTOLIC BLOOD PRESSURE: 80 MMHG | OXYGEN SATURATION: 100 % | HEIGHT: 71 IN | WEIGHT: 315 LBS | SYSTOLIC BLOOD PRESSURE: 150 MMHG | BODY MASS INDEX: 44.1 KG/M2 | HEART RATE: 90 BPM

## 2024-07-02 DIAGNOSIS — I48.91 NEW ONSET A-FIB (HCC): ICD-10-CM

## 2024-07-02 DIAGNOSIS — I10 PRIMARY HYPERTENSION: Primary | ICD-10-CM

## 2024-07-02 PROCEDURE — 99214 OFFICE O/P EST MOD 30 MIN: CPT | Performed by: INTERNAL MEDICINE

## 2024-07-02 RX ORDER — METOPROLOL SUCCINATE 100 MG/1
100 TABLET, EXTENDED RELEASE ORAL DAILY
Qty: 90 TABLET | Refills: 3 | Status: SHIPPED | OUTPATIENT
Start: 2024-07-02

## 2024-07-02 NOTE — PROGRESS NOTES
Cardiology Follow Up    Maurilio Connors  1980  [id number]  [facility]  [address]  [address]  [facility] [address] [phone]    No diagnosis found.    There are no diagnoses linked to this encounter.  I had the pleasure of seeing Maurilio Connors for a follow up visit.     INTERVAL HISTORY: see below    History of the presenting illness, Discussion/Summary and My Plan are as follows:::    Maurilio is a 43-year-old male with history of hypertension, hypertriglyceridemia but not on last blood work-December 2023, obesity-BMI of 52, who presented with palpitations-with new onset atrial fibrillation-March 2024, underwent SANDHYA-cardioversion-single clinic joules biphasic shock, successful and discharged in sinus rhythm on metoprolol 50 mg twice daily (he was previously on lisinopril 40 mg which was not continued since he was going on metoprolol).  Subsequently he saw Polly Amanda in the office and metoprolol was changed to metoprolol XL.  He is currently on metoprolol XL 50 mg once daily and apixaban 5 mg twice daily  Unfortunately he does not carry a cell phone and does not have transportation, hence he has a sleep study that is scheduled in the future.    He occasionally has palpitations and wonders if he might be going in and out of atrial fibrillation, notices that his heart rates are slower when he palpates his chest.    Plan:    Atrial fibrillation: Single episode-symptomatic-with palpitations-March 2024, no clear triggers, normal TSH and unremarkable echo  Might have obstructive sleep apnea-evaluation is pending  Considering palpitations-offered to do a 2-week cardiac rhythm monitor-he would like to hold off at this time    Hypertension: Currently elevated, will change metoprolol to metoprolol  mg daily, currently on 50 mg daily    History of hypertriglyceridemia: Not in December 2023, A1c was unremarkable  in March 2024, repeat lipid profile on a routine basis    Socio economic barriers: Does not work, does not carry his cell phone, does not have his own car and does not drive.    Follow-up in 6 months       Latest Reference Range & Units 03/22/24 06:33   Hemoglobin A1C  5.6   eAG, EST AVG Glucose mg/dl 114     Component 12/07/23 05/30/23 05/23/22 10/27/21 04/12/21 09/17/20   Cholesterol 126 124 140 137 135 151   Triglyceride 104 135 184 High  180 High  166 High  111   Cholesterol, HDL, Direct 32 35 35 Low  31 Low  35 Low  41   Cholesterol, Non-HDL 94 89 105  106  100  110    Cholesterol, LDL, Calculated 73  62  68  70  67  88    CHOL/HDL Ratio 3.9 3.5 4.00  4.42  3.86  3.68       Latest Reference Range & Units Most Recent   TSH, POC 0.45 - 5.33 uIU/mL 1.72 (E)  12/7/23 08:35   TSH 3RD GENERATON 0.450 - 4.500 uIU/mL 1.379  3/21/24 11:05   (E): External lab result    Patient Active Problem List   Diagnosis    Difficulty urinating    HTN (hypertension)    SIRS (systemic inflammatory response syndrome) (HCC)    Renal calculus    Urethral stricture    Left ureteral stone    ADILSON (acute kidney injury) (HCC)    Constipation    Gastroesophageal reflux disease    Anxiety    Morbid obesity (HCC)    New onset a-fib with RVR    Hypomagnesemia     Past Medical History:   Diagnosis Date    ADILSON (acute kidney injury) (HCC)     Anxiety     GERD (gastroesophageal reflux disease)     Hypertension     Kidney stone     Murmur, cardiac     Stress incontinence     Urethral stricture     Use of cane as ambulatory aid     as needed    Wears glasses      Social History     Socioeconomic History    Marital status: Single     Spouse name: Not on file    Number of children: Not on file    Years of education: Not on file    Highest education level: Not on file   Occupational History    Not on file   Tobacco Use    Smoking status: Former     Types: Cigarettes    Smokeless tobacco: Never   Vaping Use    Vaping status: Never Used   Substance and  Sexual Activity    Alcohol use: Not Currently    Drug use: Never    Sexual activity: Not Currently   Other Topics Concern    Not on file   Social History Narrative    Not on file     Social Determinants of Health     Financial Resource Strain: Not on file   Food Insecurity: No Food Insecurity (3/22/2024)    Hunger Vital Sign     Worried About Running Out of Food in the Last Year: Never true     Ran Out of Food in the Last Year: Never true   Transportation Needs: No Transportation Needs (3/22/2024)    PRAPARE - Transportation     Lack of Transportation (Medical): No     Lack of Transportation (Non-Medical): No   Physical Activity: Not on file   Stress: Not on file   Social Connections: Not on file   Intimate Partner Violence: Not on file   Housing Stability: Low Risk  (3/22/2024)    Housing Stability Vital Sign     Unable to Pay for Housing in the Last Year: No     Number of Times Moved in the Last Year: 1     Homeless in the Last Year: No      No family history on file.  Past Surgical History:   Procedure Laterality Date    CHOLECYSTECTOMY      2004    CYSTOSCOPY N/A 10/19/2021    Procedure: CYSTOSCOPY;  Surgeon: Pilo Horton MD;  [facility];  Service: Urology    CT CYSTO BLADDER W/URETERAL CATHETERIZATION Left 12/29/2021    Procedure: CYSTOSCOPY RETROGRADE PYELOGRAM WITH INSERTION STENT URETERAL LEFT, DVIU;  Surgeon: Ayush Holland MD;  [facility]: [facility];  Service: Urology    CT CYSTO/URETERO W/LITHOTRIPSY &INDWELL STENT INSRT Left 1/14/2022    Procedure: CYSTOSCOPY, left URETEROSCOPY, removal of migrated stent, insertion new left ureteral stent placement ;  Surgeon: Ayush Holland MD;  [facility];  Service: Urology    CT CYSTO/URETERO W/LITHOTRIPSY &INDWELL STENT INSRT Left 2/23/2022    Procedure: CYSTOSCOPY, URETEROSCOPY, BASKET EXTRACTION, STENT EXCHANGE LEFT URETER;  Surgeon: Ayush Holland MD;  [facility];  Service: Urology    CT CYSTOURETHROSCOPY W/INTERNAL  "URETHROTOMY N/A 10/19/2021    Procedure: DIRECT VISUAL INTERAL URETHROTOMY (DVIU);  Surgeon: Pilo Horton MD;  [facility];  Service: Urology       Current Outpatient Medications:     acetaminophen (TYLENOL) 325 mg tablet, Take 2 tablets (650 mg total) by mouth every 6 (six) hours as needed for mild pain, Disp: , Rfl: 0    apixaban (ELIQUIS) 5 mg, Take 1 tablet (5 mg total) by mouth 2 (two) times a day, Disp: 60 tablet, Rfl: 4    docusate sodium (COLACE) 100 mg capsule, Take 1 capsule (100 mg total) by mouth as needed for constipation, Disp: , Rfl:     metoprolol succinate (TOPROL-XL) 50 mg 24 hr tablet, Take 1 tablet (50 mg total) by mouth daily, Disp: 60 tablet, Rfl: 4    omeprazole (PriLOSEC) 20 mg delayed release capsule, Take 1 capsule (20 mg total) by mouth daily, Disp: 30 capsule, Rfl: 3  No Known Allergies    Imaging: No results found.    Review of Systems:  Review of Systems   Constitutional: Negative.    HENT: Negative.     Eyes: Negative.    Respiratory: Negative.     Cardiovascular: Negative.    Endocrine: Negative.    Musculoskeletal: Negative.        Physical Exam:  /78 (BP Location: Left arm, Patient Position: Sitting, Cuff Size: Large)   Pulse 90   Ht 5' 11\" (1.803 m)   Wt (!) 170 kg (375 lb)   SpO2 100%   BMI 52.30 kg/m²   Physical Exam  Constitutional:       General: He is not in acute distress.     Appearance: He is not ill-appearing, toxic-appearing or diaphoretic.   Cardiovascular:      Rate and Rhythm: Normal rate.      Pulses:           Carotid pulses are 2+ on the right side and 2+ on the left side.     Heart sounds: Normal heart sounds. Heart sounds not distant. No murmur heard.     No systolic murmur is present.   Pulmonary:      Effort: Pulmonary effort is normal. No tachypnea or respiratory distress.      Breath sounds: Normal breath sounds. No stridor. No decreased breath sounds or wheezing.   Abdominal:      General: There is no abdominal bruit.      " "Palpations: Abdomen is soft. There is no fluid wave, hepatomegaly or splenomegaly.   Skin:     General: Skin is warm.      Coloration: Skin is not cyanotic or pale.      Findings: No ecchymosis or erythema.   Neurological:      Mental Status: He is alert.         This note was completed in part utilizing PacerPro direct voice recognition software.   Grammatical errors, random word insertion, spelling mistakes, occasional wrong word or \"sound-alike\" substitutions and incomplete sentences may be an occasional consequence of the system secondary to software limitations, ambient noise and hardware issues. At the time of dictation, efforts were made to edit, clarify and /or correct errors.  Please read the chart carefully and recognize, using context, where substitutions have occurred.  If you have any questions or concerns about the context, text or information contained within the body of this dictation, please contact myself, the provider, for further clarification.  "

## 2024-07-17 ENCOUNTER — APPOINTMENT (OUTPATIENT)
Dept: LAB | Facility: HOSPITAL | Age: 44
End: 2024-07-17
Payer: COMMERCIAL

## 2024-07-17 DIAGNOSIS — I10 PRIMARY HYPERTENSION: ICD-10-CM

## 2024-07-17 DIAGNOSIS — I48.91 NEW ONSET A-FIB (HCC): ICD-10-CM

## 2024-07-17 LAB
CHOLEST SERPL-MCNC: 127 MG/DL
ERYTHROCYTE [DISTWIDTH] IN BLOOD BY AUTOMATED COUNT: 13.8 % (ref 11.6–15.1)
EST. AVERAGE GLUCOSE BLD GHB EST-MCNC: 123 MG/DL
HBA1C MFR BLD: 5.9 %
HCT VFR BLD AUTO: 46.3 % (ref 36.5–49.3)
HDLC SERPL-MCNC: 37 MG/DL
HGB BLD-MCNC: 14.8 G/DL (ref 12–17)
LDLC SERPL CALC-MCNC: 65 MG/DL (ref 0–100)
MCH RBC QN AUTO: 28.1 PG (ref 26.8–34.3)
MCHC RBC AUTO-ENTMCNC: 32 G/DL (ref 31.4–37.4)
MCV RBC AUTO: 88 FL (ref 82–98)
PLATELET # BLD AUTO: 223 THOUSANDS/UL (ref 149–390)
PMV BLD AUTO: 10.4 FL (ref 8.9–12.7)
RBC # BLD AUTO: 5.26 MILLION/UL (ref 3.88–5.62)
TRIGL SERPL-MCNC: 126 MG/DL
WBC # BLD AUTO: 9.74 THOUSAND/UL (ref 4.31–10.16)

## 2024-07-17 PROCEDURE — 85027 COMPLETE CBC AUTOMATED: CPT

## 2024-09-18 DIAGNOSIS — I48.0 PAROXYSMAL ATRIAL FIBRILLATION (HCC): ICD-10-CM

## 2024-09-18 NOTE — TELEPHONE ENCOUNTER
Medication: apixaban (Eliquis) 5 mg    Dose/Frequency: Take 1 tablet (5 mg total) by mouth 2 (two) times a day    Quantity: 60    Pharmacy: [facility] #[id number] - [place]    Office:   [] PCP/Provider -   [x] Speciality/Provider -     Does the patient have enough for 3 days?   [x] Yes   [] No - Send as HP to POD

## 2024-11-25 ENCOUNTER — HOSPITAL ENCOUNTER (INPATIENT)
Facility: HOSPITAL | Age: 44
LOS: 5 days | Discharge: HOME | DRG: 192 | End: 2024-11-30
Attending: EMERGENCY MEDICINE | Admitting: INTERNAL MEDICINE
Payer: COMMERCIAL

## 2024-11-25 ENCOUNTER — APPOINTMENT (EMERGENCY)
Dept: RADIOLOGY | Facility: HOSPITAL | Age: 44
DRG: 192 | End: 2024-11-25
Payer: COMMERCIAL

## 2024-11-25 DIAGNOSIS — R07.9 CHEST PAIN: ICD-10-CM

## 2024-11-25 DIAGNOSIS — I48.91 ATRIAL FIBRILLATION WITH RAPID VENTRICULAR RESPONSE (HCC): Primary | ICD-10-CM

## 2024-11-25 DIAGNOSIS — E66.01 MORBID OBESITY (HCC): ICD-10-CM

## 2024-11-25 DIAGNOSIS — R94.39 ABNORMAL STRESS TEST: ICD-10-CM

## 2024-11-25 PROBLEM — R09.89 PULMONARY VASCULAR CONGESTION: Status: ACTIVE | Noted: 2024-11-25

## 2024-11-25 PROBLEM — B34.9 NONSPECIFIC SYNDROME SUGGESTIVE OF VIRAL ILLNESS: Status: ACTIVE | Noted: 2024-11-25

## 2024-11-25 PROBLEM — R73.03 PREDIABETES: Status: ACTIVE | Noted: 2024-11-25

## 2024-11-25 LAB
2HR DELTA HS TROPONIN: 5 NG/L
ALBUMIN SERPL BCG-MCNC: 4.4 G/DL (ref 3.5–5)
ALP SERPL-CCNC: 35 U/L (ref 34–104)
ALT SERPL W P-5'-P-CCNC: 18 U/L (ref 7–52)
ANION GAP SERPL CALCULATED.3IONS-SCNC: 7 MMOL/L (ref 4–13)
APTT PPP: 28 SECONDS (ref 23–34)
AST SERPL W P-5'-P-CCNC: 28 U/L (ref 13–39)
ATRIAL RATE: 187 BPM
BASOPHILS # BLD AUTO: 0.03 THOUSANDS/ΜL (ref 0–0.1)
BASOPHILS NFR BLD AUTO: 0 % (ref 0–1)
BILIRUB SERPL-MCNC: 0.39 MG/DL (ref 0.2–1)
BNP SERPL-MCNC: 86 PG/ML (ref 0–100)
BUN SERPL-MCNC: 15 MG/DL (ref 5–25)
CALCIUM SERPL-MCNC: 9.3 MG/DL (ref 8.4–10.2)
CARDIAC TROPONIN I PNL SERPL HS: 10 NG/L (ref ?–50)
CARDIAC TROPONIN I PNL SERPL HS: 5 NG/L (ref ?–50)
CHLORIDE SERPL-SCNC: 103 MMOL/L (ref 96–108)
CO2 SERPL-SCNC: 29 MMOL/L (ref 21–32)
CREAT SERPL-MCNC: 0.78 MG/DL (ref 0.6–1.3)
EOSINOPHIL # BLD AUTO: 0 THOUSAND/ΜL (ref 0–0.61)
EOSINOPHIL NFR BLD AUTO: 0 % (ref 0–6)
ERYTHROCYTE [DISTWIDTH] IN BLOOD BY AUTOMATED COUNT: 13.5 % (ref 11.6–15.1)
FLUAV RNA RESP QL NAA+PROBE: NEGATIVE
FLUBV RNA RESP QL NAA+PROBE: NEGATIVE
GFR SERPL CREATININE-BSD FRML MDRD: 108 ML/MIN/1.73SQ M
GLUCOSE SERPL-MCNC: 103 MG/DL (ref 65–140)
HCT VFR BLD AUTO: 46.9 % (ref 36.5–49.3)
HGB BLD-MCNC: 14.9 G/DL (ref 12–17)
IMM GRANULOCYTES # BLD AUTO: 0.06 THOUSAND/UL (ref 0–0.2)
IMM GRANULOCYTES NFR BLD AUTO: 1 % (ref 0–2)
INR PPP: 1.13 (ref 0.85–1.19)
LYMPHOCYTES # BLD AUTO: 0.94 THOUSANDS/ΜL (ref 0.6–4.47)
LYMPHOCYTES NFR BLD AUTO: 8 % (ref 14–44)
MCH RBC QN AUTO: 28 PG (ref 26.8–34.3)
MCHC RBC AUTO-ENTMCNC: 31.8 G/DL (ref 31.4–37.4)
MCV RBC AUTO: 88 FL (ref 82–98)
MONOCYTES # BLD AUTO: 0.32 THOUSAND/ΜL (ref 0.17–1.22)
MONOCYTES NFR BLD AUTO: 3 % (ref 4–12)
NEUTROPHILS # BLD AUTO: 9.88 THOUSANDS/ΜL (ref 1.85–7.62)
NEUTS SEG NFR BLD AUTO: 88 % (ref 43–75)
NRBC BLD AUTO-RTO: 0 /100 WBCS
PLATELET # BLD AUTO: 238 THOUSANDS/UL (ref 149–390)
PMV BLD AUTO: 10.6 FL (ref 8.9–12.7)
POTASSIUM SERPL-SCNC: 4.6 MMOL/L (ref 3.5–5.3)
PROT SERPL-MCNC: 8 G/DL (ref 6.4–8.4)
PROTHROMBIN TIME: 15.2 SECONDS (ref 12.3–15)
QRS AXIS: 72 DEGREES
QRSD INTERVAL: 90 MS
QT INTERVAL: 294 MS
QTC INTERVAL: 429 MS
RBC # BLD AUTO: 5.32 MILLION/UL (ref 3.88–5.62)
RSV RNA RESP QL NAA+PROBE: NEGATIVE
SARS-COV-2 RNA RESP QL NAA+PROBE: NEGATIVE
SODIUM SERPL-SCNC: 139 MMOL/L (ref 135–147)
T WAVE AXIS: 12 DEGREES
TSH SERPL DL<=0.05 MIU/L-ACNC: 0.77 UIU/ML (ref 0.45–4.5)
VENTRICULAR RATE: 128 BPM
WBC # BLD AUTO: 11.23 THOUSAND/UL (ref 4.31–10.16)

## 2024-11-25 PROCEDURE — 99285 EMERGENCY DEPT VISIT HI MDM: CPT | Performed by: EMERGENCY MEDICINE

## 2024-11-25 PROCEDURE — 99285 EMERGENCY DEPT VISIT HI MDM: CPT

## 2024-11-25 PROCEDURE — 80053 COMPREHEN METABOLIC PANEL: CPT | Performed by: EMERGENCY MEDICINE

## 2024-11-25 PROCEDURE — 36415 COLL VENOUS BLD VENIPUNCTURE: CPT | Performed by: EMERGENCY MEDICINE

## 2024-11-25 PROCEDURE — 96376 TX/PRO/DX INJ SAME DRUG ADON: CPT

## 2024-11-25 PROCEDURE — 85025 COMPLETE CBC W/AUTO DIFF WBC: CPT | Performed by: EMERGENCY MEDICINE

## 2024-11-25 PROCEDURE — 85730 THROMBOPLASTIN TIME PARTIAL: CPT | Performed by: EMERGENCY MEDICINE

## 2024-11-25 PROCEDURE — 84443 ASSAY THYROID STIM HORMONE: CPT | Performed by: EMERGENCY MEDICINE

## 2024-11-25 PROCEDURE — 93010 ELECTROCARDIOGRAM REPORT: CPT | Performed by: INTERNAL MEDICINE

## 2024-11-25 PROCEDURE — 83735 ASSAY OF MAGNESIUM: CPT

## 2024-11-25 PROCEDURE — 96365 THER/PROPH/DIAG IV INF INIT: CPT

## 2024-11-25 PROCEDURE — 85610 PROTHROMBIN TIME: CPT | Performed by: EMERGENCY MEDICINE

## 2024-11-25 PROCEDURE — 71046 X-RAY EXAM CHEST 2 VIEWS: CPT

## 2024-11-25 PROCEDURE — 93005 ELECTROCARDIOGRAM TRACING: CPT

## 2024-11-25 PROCEDURE — 99223 1ST HOSP IP/OBS HIGH 75: CPT | Performed by: INTERNAL MEDICINE

## 2024-11-25 PROCEDURE — 0241U HB NFCT DS VIR RESP RNA 4 TRGT: CPT

## 2024-11-25 PROCEDURE — 83880 ASSAY OF NATRIURETIC PEPTIDE: CPT

## 2024-11-25 PROCEDURE — 84484 ASSAY OF TROPONIN QUANT: CPT | Performed by: EMERGENCY MEDICINE

## 2024-11-25 RX ORDER — DILTIAZEM HYDROCHLORIDE 5 MG/ML
0.25 INJECTION INTRAVENOUS ONCE
Status: COMPLETED | OUTPATIENT
Start: 2024-11-25 | End: 2024-11-25

## 2024-11-25 RX ORDER — METOPROLOL SUCCINATE 25 MG/1
25 TABLET, EXTENDED RELEASE ORAL ONCE
Status: COMPLETED | OUTPATIENT
Start: 2024-11-25 | End: 2024-11-25

## 2024-11-25 RX ORDER — AMOXICILLIN 250 MG
1 CAPSULE ORAL
Status: DISCONTINUED | OUTPATIENT
Start: 2024-11-25 | End: 2024-11-30 | Stop reason: HOSPADM

## 2024-11-25 RX ORDER — FUROSEMIDE 10 MG/ML
20 INJECTION INTRAMUSCULAR; INTRAVENOUS ONCE
Status: COMPLETED | OUTPATIENT
Start: 2024-11-25 | End: 2024-11-25

## 2024-11-25 RX ORDER — PANTOPRAZOLE SODIUM 40 MG/1
40 TABLET, DELAYED RELEASE ORAL
Status: DISCONTINUED | OUTPATIENT
Start: 2024-11-26 | End: 2024-11-30 | Stop reason: HOSPADM

## 2024-11-25 RX ORDER — POLYETHYLENE GLYCOL 3350 17 G/17G
17 POWDER, FOR SOLUTION ORAL DAILY
Status: DISCONTINUED | OUTPATIENT
Start: 2024-11-26 | End: 2024-11-25

## 2024-11-25 RX ORDER — BENZONATATE 100 MG/1
200 CAPSULE ORAL 3 TIMES DAILY PRN
Status: DISCONTINUED | OUTPATIENT
Start: 2024-11-25 | End: 2024-11-30 | Stop reason: HOSPADM

## 2024-11-25 RX ORDER — GUAIFENESIN 600 MG/1
1200 TABLET, EXTENDED RELEASE ORAL 2 TIMES DAILY PRN
Status: DISCONTINUED | OUTPATIENT
Start: 2024-11-25 | End: 2024-11-30 | Stop reason: HOSPADM

## 2024-11-25 RX ORDER — ACETAMINOPHEN 325 MG/1
650 TABLET ORAL EVERY 4 HOURS PRN
Status: DISCONTINUED | OUTPATIENT
Start: 2024-11-25 | End: 2024-11-30 | Stop reason: HOSPADM

## 2024-11-25 RX ORDER — POLYETHYLENE GLYCOL 3350 17 G/17G
17 POWDER, FOR SOLUTION ORAL DAILY PRN
Status: DISCONTINUED | OUTPATIENT
Start: 2024-11-25 | End: 2024-11-30 | Stop reason: HOSPADM

## 2024-11-25 RX ADMIN — METOPROLOL SUCCINATE 25 MG: 25 TABLET, EXTENDED RELEASE ORAL at 19:43

## 2024-11-25 RX ADMIN — FUROSEMIDE 20 MG: 10 INJECTION, SOLUTION INTRAMUSCULAR; INTRAVENOUS at 21:11

## 2024-11-25 RX ADMIN — APIXABAN 5 MG: 5 TABLET, FILM COATED ORAL at 19:43

## 2024-11-25 RX ADMIN — SENNOSIDES AND DOCUSATE SODIUM 1 TABLET: 8.6; 5 TABLET ORAL at 21:11

## 2024-11-25 RX ADMIN — DILTIAZEM HYDROCHLORIDE 2.5 MG/HR: 5 INJECTION, SOLUTION INTRAVENOUS at 17:41

## 2024-11-25 RX ADMIN — DILTIAZEM HYDROCHLORIDE 42.5 MG: 5 INJECTION, SOLUTION INTRAVENOUS at 16:27

## 2024-11-25 NOTE — ASSESSMENT & PLAN NOTE
Chest x-ray reveals prominence of pulmonary vasculature suggesting mild pulmonary vascular congestive change.  Patient appears to be mildly volume overloaded with leg edema and crackles.  He is saturating 93% on room air, no complaints of dyspnea or chest pain at this time.      Findings may be 2/2 pulmonary vascular resistance I/s/o BRITTON/obesity hypoventilation syndrome vs left heart disease I/s/o of tachycardia induced cardiomyopathy.  S/p 20 mg IV Lasix    Plan:  Consider additional diuresis  Consider follow-up CXR if symptoms persist  Control heart rates as above, goal <100  Follow-up BNP  Follow-up AM BMP

## 2024-11-25 NOTE — ED PROVIDER NOTES
"Time reflects when diagnosis was documented in both MDM as applicable and the Disposition within this note       Time User Action Codes Description Comment    11/25/2024  6:06 PM Oskar Regan [I48.91] Atrial fibrillation with rapid ventricular response (HCC)     11/25/2024  6:06 PM Oskar Regan [R07.9] Chest pain           ED Disposition       ED Disposition   Admit    Condition   Stable    Date/Time   Mon Nov 25, 2024  6:02 PM    Comment   Case was discussed with WOOD and the patient's admission status was agreed to be Admission Status: inpatient status to the service of Dr. Garcia .               Assessment & Plan       Medical Decision Making  44-year-old male presents with chest pain, shortness of breath, palpitations, is found to be in A-fib RVR at a rate in the 120s to 140s, is already on anticoagulation, will be given diltiazem to help with rate control, if diltiazem keeps him in good rate control and his workup is unremarkable for the chest pain he will be discharged home be encouraged to follow-up with cardiology as an outpatient.    The patient's heart rate was managed with diltiazem bolus, however it is quickly returning to being tachycardic, he will be started on diltiazem drip and will be admitted for further management.  Patient is comfortable with admission plan.    Amount and/or Complexity of Data Reviewed  Labs: ordered.  Radiology: ordered.    Risk  Prescription drug management.  Decision regarding hospitalization.             Medications   diltiazem (CARDIZEM) 125 mg in sodium chloride 0.9 % 125 mL infusion (7.5 mg/hr Intravenous Rate/Dose Change 11/25/24 6338)   diltiazem (CARDIZEM) injection 42.5 mg (42.5 mg Intravenous Given 11/25/24 1627)       ED Risk Strat Scores               History of Present Illness       Chief Complaint   Patient presents with    Atrial Fibrillation     Pt reports \"strenuous activity walking up and down stairs\" this morning and felt SOB. Heart " palpitations began this afternoon. Denies CP.       Past Medical History:   Diagnosis Date    ADILSON (acute kidney injury) (HCC)     Anxiety     Atrial fibrillation (HCC)     GERD (gastroesophageal reflux disease)     Hypertension     Kidney stone     Murmur, cardiac     Stress incontinence     Urethral stricture     Use of cane as ambulatory aid     as needed    Wears glasses       Past Surgical History:   Procedure Laterality Date    CHOLECYSTECTOMY      2004    CYSTOSCOPY N/A 10/19/2021    Procedure: CYSTOSCOPY;  Surgeon: Pilo Horton MD;  [facility];  Service: Urology    MN CYSTO BLADDER W/URETERAL CATHETERIZATION Left 12/29/2021    Procedure: CYSTOSCOPY RETROGRADE PYELOGRAM WITH INSERTION STENT URETERAL LEFT, DVIU;  Surgeon: Ayush Holland MD;  [facility];  Service: Urology    MN CYSTO/URETERO W/LITHOTRIPSY &INDWELL STENT INSRT Left 1/14/2022    Procedure: CYSTOSCOPY, left URETEROSCOPY, removal of migrated stent, insertion new left ureteral stent placement ;  Surgeon: Ayush Holland MD;  [facility];  Service: Urology    MN CYSTO/URETERO W/LITHOTRIPSY &INDWELL STENT INSRT Left 2/23/2022    Procedure: CYSTOSCOPY, URETEROSCOPY, BASKET EXTRACTION, STENT EXCHANGE LEFT URETER;  Surgeon: Ayush Holland MD;  [facility];  Service: Urology    MN CYSTOURETHROSCOPY W/INTERNAL URETHROTOMY N/A 10/19/2021    Procedure: DIRECT VISUAL INTERAL URETHROTOMY (DVIU);  Surgeon: Pilo Horton MD;  [facility];  Service: Urology      History reviewed. No pertinent family history.   Social History     Tobacco Use    Smoking status: Former     Types: Cigarettes    Smokeless tobacco: Never   Vaping Use    Vaping status: Never Used   Substance Use Topics    Alcohol use: Not Currently    Drug use: Never      E-Cigarette/Vaping    E-Cigarette Use Never User       E-Cigarette/Vaping Substances    Nicotine No     THC No     CBD No     Flavoring No       I have reviewed and agree  with the history as documented.     44-year-old male presents today with 1 day of palpitations, reports that he has been increasingly short of breath because he had use of stairs multiple times at his apartment complex, and had 1 episode of brief sudden severe chest pain that did not radiate, came to the emergency department and was found to be in A-fib RVR.  The patient reports that this has happened previously and he was placed on Eliquis for this, previously he was cardioverted.  He denies any fever, headache, dizziness, he reports that he has been recently had a cough, was recently given prednisone for his cough by an urgent care, and has not had any nausea, vomiting, constipation or diarrhea, dysuria, hematuria, or other complaints.        Review of Systems   Constitutional:  Negative for fever.   Respiratory:  Positive for cough and shortness of breath.    Cardiovascular:  Positive for chest pain and palpitations.   Gastrointestinal:  Negative for abdominal pain, diarrhea, nausea and vomiting.   Genitourinary:  Negative for dysuria and hematuria.   Neurological:  Negative for light-headedness and headaches.           Objective       ED Triage Vitals   Temperature Pulse Blood Pressure Respirations SpO2 Patient Position - Orthostatic VS   11/25/24 1634 11/25/24 1532 11/25/24 1531 11/25/24 1531 11/25/24 1531 --   98.4 °F (36.9 °C) (!) 131 141/89 20 95 %       Temp Source Heart Rate Source BP Location FiO2 (%) Pain Score    11/25/24 1634 -- -- -- --    Oral          Vitals      Date and Time Temp Pulse SpO2 Resp BP Pain Score FACES Pain Rating User   11/25/24 1828 -- 106 -- -- 120/62 -- -- MD   11/25/24 1815 -- 103 92 % 20 120/62 -- -- MD   11/25/24 1800 -- 105 -- -- 131/68 -- -- MD   11/25/24 1745 -- 104 93 % 20 131/68 -- -- MD   11/25/24 1741 -- 105 -- -- 133/64 -- -- MD   11/25/24 1730 -- 103 97 % 20 138/65 -- -- MD   11/25/24 1715 -- 103 92 % 20 117/60 -- -- MD   11/25/24 1700 -- 101 92 % 20 140/63 -- -- MD    11/25/24 1637 -- 88 95 % 20 123/58 -- -- MD   11/25/24 1634 98.4 °F (36.9 °C) -- -- -- -- -- -- MD   11/25/24 1630 -- 137 97 % 18 121/69 -- -- MD   11/25/24 1532 -- 131 -- -- -- -- -- MD   11/25/24 1531 -- -- 95 % 20 141/89 -- -- MD            Physical Exam  Vitals and nursing note reviewed.   Constitutional:       General: He is not in acute distress.     Appearance: Normal appearance. He is obese.   HENT:      Head: Normocephalic and atraumatic.      Right Ear: External ear normal.      Left Ear: External ear normal.      Mouth/Throat:      Mouth: Mucous membranes are moist.      Pharynx: Oropharynx is clear.   Eyes:      Conjunctiva/sclera: Conjunctivae normal.      Pupils: Pupils are equal, round, and reactive to light.   Cardiovascular:      Rate and Rhythm: Tachycardia present. Rhythm irregular.   Pulmonary:      Effort: Pulmonary effort is normal. No respiratory distress.   Abdominal:      General: Abdomen is flat. There is no distension.   Musculoskeletal:         General: No deformity. Normal range of motion.      Cervical back: Normal range of motion.   Skin:     General: Skin is warm and dry.   Neurological:      General: No focal deficit present.      Mental Status: He is alert and oriented to person, place, and time.   Psychiatric:         Mood and Affect: Mood normal.         Behavior: Behavior normal.         Results Reviewed       Procedure Component Value Units Date/Time    HS Troponin I 2hr [[id number]] Collected: 11/25/24 1848    Lab Status: In process Specimen: Blood from Arm, Left Updated: 11/25/24 1857    TSH [[id number]]  (Normal) Collected: 11/25/24 1617    Lab Status: Final result Specimen: Blood from Arm, Right Updated: 11/25/24 1856     TSH 3RD GENERATON 0.771 uIU/mL     HS Troponin I 4hr [[id number]]     Lab Status: No result Specimen: Blood     APTT [[id number]]  (Normal) Collected: 11/25/24 1617    Lab Status: Final result Specimen: Blood from Arm, Right Updated: 11/25/24 1700     PTT 28  seconds     Protime-INR [[id number]]  (Abnormal) Collected: 11/25/24 1617    Lab Status: Final result Specimen: Blood from Arm, Right Updated: 11/25/24 1700     Protime 15.2 seconds      INR 1.13    Narrative:      INR Therapeutic Range    Indication                                             INR Range      Atrial Fibrillation                                               2.0-3.0  Hypercoagulable State                                    2.0.2.3  Left Ventricular Asist Device                            2.0-3.0  Mechanical Heart Valve                                  -    Aortic(with afib, MI, embolism, HF, LA enlargement,    and/or coagulopathy)                                     2.0-3.0 (2.5-3.5)     Mitral                                                             2.5-3.5  Prosthetic/Bioprosthetic Heart Valve               2.0-3.0  Venous thromboembolism (VTE: VT, PE        2.0-3.0    HS Troponin 0hr (reflex protocol) [[id number]]  (Normal) Collected: 11/25/24 1617    Lab Status: Final result Specimen: Blood from Arm, Right Updated: 11/25/24 1659     hs TnI 0hr 5 ng/L     Comprehensive metabolic panel [[id number]] Collected: 11/25/24 1617    Lab Status: Final result Specimen: Blood from Arm, Right Updated: 11/25/24 1656     Sodium 139 mmol/L      Potassium 4.6 mmol/L      Chloride 103 mmol/L      CO2 29 mmol/L      ANION GAP 7 mmol/L      BUN 15 mg/dL      Creatinine 0.78 mg/dL      Glucose 103 mg/dL      Calcium 9.3 mg/dL      AST 28 U/L      ALT 18 U/L      Alkaline Phosphatase 35 U/L      Total Protein 8.0 g/dL      Albumin 4.4 g/dL      Total Bilirubin 0.39 mg/dL      eGFR 108 ml/min/1.73sq m     Narrative:      National Kidney Disease Foundation guidelines for Chronic Kidney Disease (CKD):     Stage 1 with normal or high GFR (GFR > 90 mL/min/1.73 square meters)    Stage 2 Mild CKD (GFR = 60-89 mL/min/1.73 square meters)    Stage 3A Moderate CKD (GFR = 45-59 mL/min/1.73 square meters)    Stage 3B Moderate CKD  (GFR = 30-44 mL/min/1.73 square meters)    Stage 4 Severe CKD (GFR = 15-29 mL/min/1.73 square meters)    Stage 5 End Stage CKD (GFR <15 mL/min/1.73 square meters)  Note: GFR calculation is accurate only with a steady state creatinine    CBC and differential [[id number]]  (Abnormal) Collected: 11/25/24 1617    Lab Status: Final result Specimen: Blood from Arm, Right Updated: 11/25/24 1630     WBC 11.23 Thousand/uL      RBC 5.32 Million/uL      Hemoglobin 14.9 g/dL      Hematocrit 46.9 %      MCV 88 fL      MCH 28.0 pg      MCHC 31.8 g/dL      RDW 13.5 %      MPV 10.6 fL      Platelets 238 Thousands/uL      nRBC 0 /100 WBCs      Segmented % 88 %      Immature Grans % 1 %      Lymphocytes % 8 %      Monocytes % 3 %      Eosinophils Relative 0 %      Basophils Relative 0 %      Absolute Neutrophils 9.88 Thousands/µL      Absolute Immature Grans 0.06 Thousand/uL      Absolute Lymphocytes 0.94 Thousands/µL      Absolute Monocytes 0.32 Thousand/µL      Eosinophils Absolute 0.00 Thousand/µL      Basophils Absolute 0.03 Thousands/µL             XR chest 2 views   Final Interpretation by Cornell Weiss MD (11/25 1641)      Prominence of pulmonary vasculature suggesting mild pulmonary vascular congestive change.      Workstation performed: [id number]             ECG 12 Lead Documentation Only    Date/Time: 11/25/2024 3:37 PM    Performed by: Oskar Regan MD  Authorized by: Oskar Regan MD    ECG reviewed by me, the ED Provider: yes    Patient location:  ED  Previous ECG:     Previous ECG:  Compared to current    Similarity:  Changes noted  Interpretation:     Interpretation: abnormal    Rate:     ECG rate:  128    ECG rate assessment: tachycardic    Rhythm:     Rhythm: atrial fibrillation    Ectopy:     Ectopy: none    QRS:     QRS axis:  Normal    QRS intervals:  Normal  Conduction:     Conduction: normal    ST segments:     ST segments:  Normal  T waves:     T waves: normal    ECG 12 Lead  Documentation Only    Date/Time: 11/25/2024 5:23 PM    Performed by: Oskar Regan MD  Authorized by: Oskar Regan MD    ECG reviewed by me, the ED Provider: yes    Patient location:  ED  Previous ECG:     Previous ECG:  Compared to current    Similarity:  Changes noted  Interpretation:     Interpretation: abnormal    Rate:     ECG rate:  97    ECG rate assessment: normal    Rhythm:     Rhythm: atrial fibrillation    Ectopy:     Ectopy: none    QRS:     QRS axis:  Normal    QRS intervals:  Normal  Conduction:     Conduction: normal    ST segments:     ST segments:  Normal  T waves:     T waves: normal        ED Medication and Procedure Management   Prior to Admission Medications   Prescriptions Last Dose Informant Patient Reported? Taking?   acetaminophen (TYLENOL) 325 mg tablet  Self No No   Sig: Take 2 tablets (650 mg total) by mouth every 6 (six) hours as needed for mild pain   apixaban (ELIQUIS) 5 mg   No No   Sig: Take 1 tablet (5 mg total) by mouth 2 (two) times a day   docusate sodium (COLACE) 100 mg capsule  Self No No   Sig: Take 1 capsule (100 mg total) by mouth as needed for constipation   metoprolol succinate (TOPROL-XL) 100 mg 24 hr tablet   No No   Sig: Take 1 tablet (100 mg total) by mouth daily   omeprazole (PriLOSEC) 20 mg delayed release capsule  Self No No   Sig: Take 1 capsule (20 mg total) by mouth daily      Facility-Administered Medications: None     Patient's Medications   Discharge Prescriptions    No medications on file     No discharge procedures on file.  ED SEPSIS DOCUMENTATION   Time reflects when diagnosis was documented in both MDM as applicable and the Disposition within this note       Time User Action Codes Description Comment    11/25/2024  6:06 PM Oskar Regan [I48.91] Atrial fibrillation with rapid ventricular response (HCC)     11/25/2024  6:06 PM Oskar Regan [R07.9] Chest pain                  Oskar Regan  MD  11/25/24 8846

## 2024-11-25 NOTE — ASSESSMENT & PLAN NOTE
5 day h/o sneezing, scratchy throat, cough and nasal congestion. Was prescribed promethazine syrup and prednisone 40 mg for 7 days at urgent care clinic, he is on day 3 of prednisone.  Will treat supportively off prednisone.    Plan:  Follow-up flu COVID RSV  Tessalon Perles  Mucinex  Follow-up a.m. CBC

## 2024-11-25 NOTE — ASSESSMENT & PLAN NOTE
Lab Results   Component Value Date    HGBA1C 5.9 (H) 07/17/2024   Recent diagnosis of prediabetes, patient does not take any diabetes medications.  Was euglycemic PTA.  Will monitor off sliding scale

## 2024-11-25 NOTE — ASSESSMENT & PLAN NOTE
Patient with history of single episode of symptomatic A-fib with RVR (March 2024) on Toprol 100 mg daily and Eliquis, re-presenting with A-fib with RVR.  Workup in March was overall unrevealing with no clear exacerbating factors including thyroid disease, underlying heart disease, infection or alcohol use.  Lab Results   Component Value Date    [id number] 0.771 11/25/2024      Lab Results   Component Value Date    HGBA1C 5.9 (H) 07/17/2024      POA  EKG A-fib with RVR 150s s/p Cardizem drip, 100s by my evaluation, HD stable  Chest x-ray prominence of pulmonary vasculature suggesting mild pulmonary vascular congestive change  [id number] 2  Likely 2/2 recent viral illness, BRITTON, obesity hypoventilation syndrome, cardiac remodeling I/s/o persistent tachycardia versus medication induced    Plan:  Rate/Rhythm control: Continue diltiazem drip, plan to transition to Toprol 125 mg  AC: Continue Eliquis 5 mg twice daily  Monitoring on telemetry  Cardiology consult appreciated  NPO at [facility] for potential cardioversion if patient does not convert to sinus  Monitor vital signs and ensure hemodynamically stable  Cardioversion if hemodynamically unstable

## 2024-11-25 NOTE — H&P
H&P - Hospitalist   Name: Maurilio Connors 44 y.o. male I MRN: [id number]  Unit/Bed#: ED-04 I Date of Admission: 11/25/2024   Date of Service: 11/25/2024 I Hospital Day: 0     Assessment & Plan  Atrial fibrillation with rapid ventricular response (HCC)  Patient with history of single episode of symptomatic A-fib with RVR (March 2024) on Toprol 100 mg daily and Eliquis, re-presenting with A-fib with RVR.  Workup in March was overall unrevealing with no clear exacerbating factors including thyroid disease, underlying heart disease, infection or alcohol use.  Lab Results   Component Value Date    [id number] 0.771 11/25/2024      Lab Results   Component Value Date    HGBA1C 5.9 (H) 07/17/2024      POA  EKG A-fib with RVR 150s s/p Cardizem drip, 100s by my evaluation, HD stable  Chest x-ray prominence of pulmonary vasculature suggesting mild pulmonary vascular congestive change  [id number] 2  Likely 2/2 recent viral illness, BRITTON, obesity hypoventilation syndrome, cardiac remodeling I/s/o persistent tachycardia versus medication induced    Plan:  Rate/Rhythm control: Continue diltiazem drip, plan to transition to Toprol 125 mg  AC: Continue Eliquis 5 mg twice daily  Monitoring on telemetry  Cardiology consult appreciated  NPO at [facility] for potential cardioversion if patient does not convert to sinus  Monitor vital signs and ensure hemodynamically stable  Cardioversion if hemodynamically unstable   Pulmonary vascular congestion  Chest x-ray reveals prominence of pulmonary vasculature suggesting mild pulmonary vascular congestive change.  Patient appears to be mildly volume overloaded with leg edema and crackles.  He is saturating 93% on room air, no complaints of dyspnea or chest pain at this time.      Findings may be 2/2 pulmonary vascular resistance I/s/o BRITTON/obesity hypoventilation syndrome vs left heart disease I/s/o of tachycardia induced cardiomyopathy.  S/p 20 mg IV Lasix    Plan:  Consider additional  "diuresis  Consider follow-up CXR if symptoms persist  Control heart rates as above, goal <100  Follow-up BNP  Follow-up AM BMP  Nonspecific syndrome suggestive of viral illness  5 day h/o sneezing, scratchy throat, cough and nasal congestion. Was prescribed promethazine syrup and prednisone 40 mg for 7 days at urgent care clinic, he is on day 3 of prednisone.  Will treat supportively off prednisone.    Plan:  Follow-up flu COVID RSV  Tessalon Perles  Mucinex  Follow-up a.m. CBC  Morbid obesity (HCC)  Outpatient sleep study? Did not because of transportation issues. Patient uses cane to ambulate, 375lb There is no height or weight on file to calculate BMI.     Plan:  PT/OT  Encourage patient to pursue outpatient sleep study  Prediabetes  Lab Results   Component Value Date    HGBA1C 5.9 (H) 07/17/2024   Recent diagnosis of prediabetes, patient does not take any diabetes medications.  Was euglycemic PTA.  Will monitor off sliding scale  HTN (hypertension)  Blood Pressure: 128/72 will increase metoprolol XL/Toprol from 100 mg to 125 mg as above.    VTE Pharmacologic Prophylaxis:   Moderate Risk (Score 3-4) - Pharmacological DVT Prophylaxis Ordered: apixaban (Eliquis).  Code Status: Level 1 - Full Code   Discussion with family: Patient declined call to .     Anticipated Length of Stay: Patient will be admitted on an inpatient basis with an anticipated length of stay of greater than 2 midnights secondary to afib with RVR.    History of Present Illness   Chief Complaint: \"My heart is racing\"      Maurilio Connors is a 44 y.o. male with a PMH of morbid obesity, HTN, prediabetes,  who presents with palpitations. Patient was admitted in March for new onset A-fib with RVR, initially in rapid A-fib heart rates 1 60-1 80 no clear exacerbating factors including thyroid disease, underlying heart disease, infection or alcohol use was found at the time and patient converted to sinus rhythm after cardioversion 3/22 was " "started on metoprolol 50 mg twice daily and Eliquis 5 mg twice daily.  On review of patient's cardiology office visit in July, patient was offered a 2-week cardiac rhythm monitor and wanted to hold off on that time.  Was switched to metoprolol XL 50 mg to metoprolol  mg daily due to complaints of palpitations, but was in NSR in the office.    Since Wednesday with sneezing, scratchy throat, cough and nasal congestion.  Was prescribed promethazine syrup and prednisone 40 mg for 7 days at urgent care, he is on day 3. He was in contact with COVID and is concerned that he might have carried it. Patient is tired, without shortness of breath. Denies any nausea vomiting or diarrhea. Around 2pm he was eating and then his heart started pounding, he stated this felt similar to his March episode and d/t concern of the persistent irregular rhythm, he presented to the ED for further evaluation. He states that his heart has \"pounding\" over the past couple months but today he was concerned because of the irregular rhythm.  Of note patient has had recent life stressors including caring for his chronically ill mother.  He has some social issues including lack of transportation, lack of phone.  No job.    ED course: Normotensive 140s to 130s systolic 80s to 60s diastolic, satting on room air, tachycardic to 105 labs remarkable for mild leukocytosis 11.2.  Chest x-ray remarkable for prominence of pulmonary vasculature suggesting mild pulmonary vascular congestive change.  Patient's status post 42.5 mg IV Cardizem, initiated on Cardizem drip.  EKG A-fib with RVR rate 97, patient was in A-fib in March    SANDHYA March 2024 LVEF 65%, no systolic or diastolic dysfunction or wall abnormalities    Review of Systems   Constitutional:  Negative for appetite change, chills and fever.   HENT:  Positive for congestion. Negative for sneezing and sore throat.    Respiratory:  Negative for choking, chest tightness and shortness of breath.  " "  Cardiovascular:  Positive for palpitations and leg swelling. Negative for chest pain.   Genitourinary:  Negative for difficulty urinating and frequency.   Neurological:  Negative for dizziness, light-headedness and headaches.   Patient comments his legs feel \"heavier\"    Historical Information   Past Medical History:   Diagnosis Date    ADILSON (acute kidney injury) (HCC)     Anxiety     Atrial fibrillation (HCC)     GERD (gastroesophageal reflux disease)     Hypertension     Kidney stone     Murmur, cardiac     Stress incontinence     Urethral stricture     Use of cane as ambulatory aid     as needed    Wears glasses      Past Surgical History:   Procedure Laterality Date    CHOLECYSTECTOMY      2004    CYSTOSCOPY N/A 10/19/2021    Procedure: CYSTOSCOPY;  Surgeon: Pilo Horton MD;  Location: [facility];  Service: Urology    SC CYSTO BLADDER W/URETERAL CATHETERIZATION Left 12/29/2021    Procedure: CYSTOSCOPY RETROGRADE PYELOGRAM WITH INSERTION STENT URETERAL LEFT, DVIU;  Surgeon: Ayush Holland MD;  Location: [facility];  Service: Urology    SC CYSTO/URETERO W/LITHOTRIPSY &INDWELL STENT INSRT Left 1/14/2022    Procedure: CYSTOSCOPY, left URETEROSCOPY, removal of migrated stent, insertion new left ureteral stent placement ;  Surgeon: Ayush Holland MD;  Location: [facility];  Service: Urology    SC CYSTO/URETERO W/LITHOTRIPSY &INDWELL STENT INSRT Left 2/23/2022    Procedure: CYSTOSCOPY, URETEROSCOPY, BASKET EXTRACTION, STENT EXCHANGE LEFT URETER;  Surgeon: Ayush Holland MD;  Location: [facility];  Service: Urology    SC CYSTOURETHROSCOPY W/INTERNAL URETHROTOMY N/A 10/19/2021    Procedure: DIRECT VISUAL INTERAL URETHROTOMY (DVIU);  Surgeon: Pilo Horton MD;  Location: [facility];  Service: Urology     Social History     Tobacco Use    Smoking status: Former     Types: Cigarettes    Smokeless tobacco: Never   Vaping Use    Vaping status: Never Used   Substance and Sexual Activity    Alcohol use: Not " Currently    Drug use: Never    Sexual activity: Not Currently     E-Cigarette/Vaping    E-Cigarette Use Never User      E-Cigarette/Vaping Substances    Nicotine No     THC No     CBD No     Flavoring No      History reviewed. No pertinent family history.  Social History:  Marital Status: Single   Patient Pre-hospital Living Situation:  Does not work, does not carry his cell phone, does not have his own car and does not drive.   Patient Pre-hospital Level of Mobility: walks with a cane  Patient Pre-hospital Diet Restrictions: none    Meds/Allergies   I have reviewed home medications with patient personally.  Prior to Admission medications    Medication Sig Start Date End Date Taking? Authorizing Provider   acetaminophen (TYLENOL) 325 mg tablet Take 2 tablets (650 mg total) by mouth every 6 (six) hours as needed for mild pain 12/31/21   JANINE Dennison   apixaban (ELIQUIS) 5 mg Take 1 tablet (5 mg total) by mouth 2 (two) times a day 9/18/24 2/15/25  JANINE Vallecillo   docusate sodium (COLACE) 100 mg capsule Take 1 capsule (100 mg total) by mouth as needed for constipation 3/22/24   Angel Kellogg PA-C   metoprolol succinate (TOPROL-XL) 100 mg 24 hr tablet Take 1 tablet (100 mg total) by mouth daily 7/2/24   Jovon Brownlee MD   omeprazole (PriLOSEC) 20 mg delayed release capsule Take 1 capsule (20 mg total) by mouth daily 5/31/18   Kelly Christian, DO     No Known Allergies    Objective :  Temp:  [98.4 °F (36.9 °C)] 98.4 °F (36.9 °C)  HR:  [] 103  BP: (117-141)/(58-89) 128/72  Resp:  [18-20] 20  SpO2:  [92 %-97 %] 92 %    Physical Exam  Vitals and nursing note reviewed.   Constitutional:       General: He is not in acute distress.     Appearance: He is well-developed. He is obese.   HENT:      Head: Normocephalic and atraumatic.   Eyes:      Conjunctiva/sclera: Conjunctivae normal.   Cardiovascular:      Rate and Rhythm: Normal rate and regular rhythm.      Heart sounds: No murmur  heard.  Pulmonary:      Effort: Pulmonary effort is normal. No respiratory distress.      Breath sounds: Normal breath sounds.      Comments: Faint pulmonary crackles, however overall difficult to auscultate due to body habitus  Abdominal:      Palpations: Abdomen is soft.      Tenderness: There is no abdominal tenderness.   Musculoskeletal:         General: Swelling present.      Cervical back: Neck supple.      Comments: Bilateral lower leg swelling   Skin:     General: Skin is warm and dry.      Capillary Refill: Capillary refill takes less than 2 seconds.   Neurological:      Mental Status: He is alert.   Psychiatric:         Mood and Affect: Mood normal.         Lab Results: I have reviewed the following results:  Results from last 7 days   Lab Units 11/25/24  1617   WBC Thousand/uL 11.23*   HEMOGLOBIN g/dL 14.9   HEMATOCRIT % 46.9   PLATELETS Thousands/uL 238   SEGS PCT % 88*   LYMPHO PCT % 8*   MONO PCT % 3*   EOS PCT % 0     Results from last 7 days   Lab Units 11/25/24  1617   SODIUM mmol/L 139   POTASSIUM mmol/L 4.6   CHLORIDE mmol/L 103   CO2 mmol/L 29   BUN mg/dL 15   CREATININE mg/dL 0.78   ANION GAP mmol/L 7   CALCIUM mg/dL 9.3   ALBUMIN g/dL 4.4   TOTAL BILIRUBIN mg/dL 0.39   ALK PHOS U/L 35   ALT U/L 18   AST U/L 28   GLUCOSE RANDOM mg/dL 103     Results from last 7 days   Lab Units 11/25/24  1617   INR  1.13         Lab Results   Component Value Date    HGBA1C 5.9 (H) 07/17/2024    HGBA1C 5.6 03/22/2024    HGBA1C 5.4 09/05/2015                 Administrative Statements       ** Please Note: This note has been constructed using a voice recognition system. **

## 2024-11-26 LAB
ANION GAP SERPL CALCULATED.3IONS-SCNC: 7 MMOL/L (ref 4–13)
ATRIAL RATE: 78 BPM
BUN SERPL-MCNC: 15 MG/DL (ref 5–25)
CALCIUM SERPL-MCNC: 8.8 MG/DL (ref 8.4–10.2)
CHLORIDE SERPL-SCNC: 103 MMOL/L (ref 96–108)
CO2 SERPL-SCNC: 31 MMOL/L (ref 21–32)
CREAT SERPL-MCNC: 0.66 MG/DL (ref 0.6–1.3)
ERYTHROCYTE [DISTWIDTH] IN BLOOD BY AUTOMATED COUNT: 13.5 % (ref 11.6–15.1)
GFR SERPL CREATININE-BSD FRML MDRD: 115 ML/MIN/1.73SQ M
GLUCOSE SERPL-MCNC: 79 MG/DL (ref 65–140)
HCT VFR BLD AUTO: 43.8 % (ref 36.5–49.3)
HGB BLD-MCNC: 14.3 G/DL (ref 12–17)
MAGNESIUM SERPL-MCNC: 2.1 MG/DL (ref 1.9–2.7)
MCH RBC QN AUTO: 28.1 PG (ref 26.8–34.3)
MCHC RBC AUTO-ENTMCNC: 32.6 G/DL (ref 31.4–37.4)
MCV RBC AUTO: 86 FL (ref 82–98)
PLATELET # BLD AUTO: 208 THOUSANDS/UL (ref 149–390)
PMV BLD AUTO: 9.9 FL (ref 8.9–12.7)
POTASSIUM SERPL-SCNC: 3.3 MMOL/L (ref 3.5–5.3)
PROCALCITONIN SERPL-MCNC: <0.05 NG/ML
QRS AXIS: 62 DEGREES
QRSD INTERVAL: 94 MS
QT INTERVAL: 320 MS
QTC INTERVAL: 406 MS
RBC # BLD AUTO: 5.08 MILLION/UL (ref 3.88–5.62)
SODIUM SERPL-SCNC: 141 MMOL/L (ref 135–147)
T WAVE AXIS: 8 DEGREES
VENTRICULAR RATE: 97 BPM
WBC # BLD AUTO: 10.78 THOUSAND/UL (ref 4.31–10.16)

## 2024-11-26 PROCEDURE — 99222 1ST HOSP IP/OBS MODERATE 55: CPT | Performed by: INTERNAL MEDICINE

## 2024-11-26 PROCEDURE — 97167 OT EVAL HIGH COMPLEX 60 MIN: CPT

## 2024-11-26 PROCEDURE — 84145 PROCALCITONIN (PCT): CPT | Performed by: INTERNAL MEDICINE

## 2024-11-26 PROCEDURE — 93010 ELECTROCARDIOGRAM REPORT: CPT | Performed by: INTERNAL MEDICINE

## 2024-11-26 PROCEDURE — 99232 SBSQ HOSP IP/OBS MODERATE 35: CPT | Performed by: INTERNAL MEDICINE

## 2024-11-26 PROCEDURE — 85027 COMPLETE CBC AUTOMATED: CPT

## 2024-11-26 PROCEDURE — 97163 PT EVAL HIGH COMPLEX 45 MIN: CPT

## 2024-11-26 PROCEDURE — 80048 BASIC METABOLIC PNL TOTAL CA: CPT

## 2024-11-26 RX ORDER — POTASSIUM CHLORIDE 1500 MG/1
40 TABLET, EXTENDED RELEASE ORAL ONCE
Status: COMPLETED | OUTPATIENT
Start: 2024-11-26 | End: 2024-11-26

## 2024-11-26 RX ADMIN — PANTOPRAZOLE SODIUM 40 MG: 40 TABLET, DELAYED RELEASE ORAL at 05:11

## 2024-11-26 RX ADMIN — METOPROLOL SUCCINATE 125 MG: 100 TABLET, EXTENDED RELEASE ORAL at 08:05

## 2024-11-26 RX ADMIN — APIXABAN 5 MG: 5 TABLET, FILM COATED ORAL at 08:06

## 2024-11-26 RX ADMIN — APIXABAN 5 MG: 5 TABLET, FILM COATED ORAL at 17:02

## 2024-11-26 RX ADMIN — POTASSIUM CHLORIDE 20 MEQ: 1500 TABLET, EXTENDED RELEASE ORAL at 08:05

## 2024-11-26 NOTE — PROGRESS NOTES
Progress Note - Hospitalist   Name: Maurilio Connors 44 y.o. male I MRN: [id number]  Unit/Bed#: S -01 I Date of Admission: 11/25/2024   Date of Service: 11/26/2024 I Hospital Day: 1    Assessment & Plan  Atrial fibrillation with rapid ventricular response (HCC)  Patient with history of single episode of symptomatic A-fib with RVR (March 2024) on Toprol 100 mg daily and Eliquis, re-presenting with A-fib with RVR.  Workup in March was overall unrevealing with no clear exacerbating factors including thyroid disease, underlying heart disease, infection or alcohol use.  Lab Results   Component Value Date    [id number] 0.771 11/25/2024      Lab Results   Component Value Date    HGBA1C 5.9 (H) 07/17/2024      POA  EKG A-fib with RVR 150s s/p Cardizem drip, 100s by my evaluation, HD stable  Chest x-ray prominence of pulmonary vasculature suggesting mild pulmonary vascular congestive change  [id number] 2  Likely 2/2 recent viral illness, BRITTON, obesity hypoventilation syndrome, cardiac remodeling I/s/o persistent tachycardia versus medication induced    Plan:  Rate/Rhythm control: Continue diltiazem drip at 2.5 mg toprol 125  AC: Continue Eliquis 5 mg twice daily  Monitoring on telemetry  Cardiology consult appreciated  Consider cardioversion 11/27   Dispo  Needs sleep study upon discharge  Consider referral for EP for possible ablation  Pulmonary vascular congestion  Chest x-ray reveals prominence of pulmonary vasculature suggesting mild pulmonary vascular congestive change.  Patient appears to be mildly volume overloaded with leg edema and crackles.  He is saturating 93% on room air, no complaints of dyspnea or chest pain at this time.      Findings may be 2/2 pulmonary vascular resistance I/s/o BRITTON/obesity hypoventilation syndrome vs left heart disease I/s/o of tachycardia induced cardiomyopathy.  S/p 20 mg IV Lasix    Plan:  Continue follow-up daily labs  Nonspecific syndrome suggestive of viral illness  5 day h/o  sneezing, scratchy throat, cough and nasal congestion. Was prescribed promethazine syrup and prednisone 40 mg for 7 days at urgent care clinic, he is on day 3 of prednisone.  Will treat supportively off prednisone.    Plan:  Tessalon Perles  Mucinex  Follow clinically  Morbid obesity (HCC)  Outpatient sleep study? Did not because of transportation issues. Patient uses cane to ambulate, 375lb Body mass index is 51.13 kg/m².     Plan:  PT/OT  Encourage patient to pursue outpatient sleep study  Prediabetes  Lab Results   Component Value Date    HGBA1C 5.9 (H) 07/17/2024   Recent diagnosis of prediabetes, patient does not take any diabetes medications.  Was euglycemic PTA.  Will monitor off sliding scale  HTN (hypertension)  Blood Pressure: 142/78 will increase metoprolol XL/Toprol from 100 mg to 125 mg as above.    VTE Pharmacologic Prophylaxis:   High Risk (Score >/= 5) - Pharmacological DVT Prophylaxis Ordered: apixaban (Eliquis). Sequential Compression Devices Ordered.    Mobility:   JH-HLM Achieved: 6: Walk 10 steps or more  JH-HLM Goal achieved. Continue to encourage appropriate mobility.    Patient Centered Rounds: I performed bedside rounds with nursing staff today.   Discussions with Specialists or Other Care Team Provider: Nursing    Education and Discussions with Family / Patient: Plan to update the family regarding the patient's condition and care plan via phone or in person at a later time of today.     Current Length of Stay: 1 day(s)  Current Patient Status: Inpatient   Certification Statement: The patient will continue to require additional inpatient hospital stay due to afib  Discharge Plan: Anticipate discharge in 24-48 hrs to home.    Code Status: Level 1 - Full Code    Subjective   Overnight: No acute events over night     Complaints: No complaints.     Patient denies Headache, Fever, Chills, Chest Pain, Shortness of breath, Nausea, or Vomiting, Abdominal Pain, Diarrhea, Swellings, Dysuria, Urgency,  frequency, or incontinence, Hematuria, new or worsening anxiety or depression  Additionally denies any palpitation.     Diet: Diet NPO; Sips with meds   Bowel regimen:       DVT/VTE Prophylaxis: VTE covered by:  apixaban, Oral, 5 mg at 11/26/24 0806          Objective :  Temp:  [98.3 °F (36.8 °C)-98.4 °F (36.9 °C)] 98.3 °F (36.8 °C)  HR:  [] 95  BP: (117-142)/(58-89) 142/78  Resp:  [18-20] 20  SpO2:  [92 %-98 %] 98 %  O2 Device: None (Room air)    Body mass index is 51.13 kg/m².     Input and Output Summary (last 24 hours):     Intake/Output Summary (Last 24 hours) at 11/26/2024 1009  Last data filed at 11/26/2024 0901  Gross per 24 hour   Intake 240 ml   Output 2696 ml   Net -2456 ml       Physical Exam  Constitutional:       Appearance: He is obese.   HENT:      Head: Normocephalic and atraumatic.      Mouth/Throat:      Mouth: Mucous membranes are moist.   Eyes:      Extraocular Movements: Extraocular movements intact.   Cardiovascular:      Rate and Rhythm: Normal rate. Rhythm irregular.      Heart sounds: No murmur heard.  Pulmonary:      Effort: Pulmonary effort is normal.      Breath sounds: Normal breath sounds.   Abdominal:      Palpations: Abdomen is soft.   Musculoskeletal:      Cervical back: Neck supple.   Skin:     General: Skin is warm and dry.      Capillary Refill: Capillary refill takes less than 2 seconds.   Neurological:      Mental Status: He is alert.         Lines/Drains:        Telemetry:  Telemetry Orders (From admission, onward)               24 Hour Telemetry Monitoring  Continuous x 24 Hours (Telem)        Question:  Reason for 24 Hour Telemetry  Answer:  Arrhythmias requiring acute medical intervention / PPM or ICD malfunction                     Telemetry Reviewed: Atrial fibrillation. HR averaging 95  Indication for Continued Telemetry Use: monitor for afib with rvr               Lab Results: I have reviewed the following results:   Results from last 7 days   Lab Units  11/26/24  0450 11/25/24  1617   WBC Thousand/uL 10.78* 11.23*   HEMOGLOBIN g/dL 14.3 14.9   HEMATOCRIT % 43.8 46.9   PLATELETS Thousands/uL 208 238   SEGS PCT %  --  88*   LYMPHO PCT %  --  8*   MONO PCT %  --  3*   EOS PCT %  --  0     Results from last 7 days   Lab Units 11/26/24  0450 11/25/24  1617   SODIUM mmol/L 141 139   POTASSIUM mmol/L 3.3* 4.6   CHLORIDE mmol/L 103 103   CO2 mmol/L 31 29   BUN mg/dL 15 15   CREATININE mg/dL 0.66 0.78   ANION GAP mmol/L 7 7   CALCIUM mg/dL 8.8 9.3   ALBUMIN g/dL  --  4.4   TOTAL BILIRUBIN mg/dL  --  0.39   ALK PHOS U/L  --  35   ALT U/L  --  18   AST U/L  --  28   GLUCOSE RANDOM mg/dL 79 103     Results from last 7 days   Lab Units 11/25/24  1617   INR  1.13             Results from last 7 days   Lab Units 11/26/24  0450   PROCALCITONIN ng/ml <0.05       Recent Cultures (last 7 days):               Last 24 Hours Medication List:     Current Facility-Administered Medications:     acetaminophen (TYLENOL) tablet 650 mg, Q4H PRN    apixaban (ELIQUIS) tablet 5 mg, BID    benzonatate (TESSALON PERLES) capsule 200 mg, TID PRN    diltiazem (CARDIZEM) 125 mg in sodium chloride 0.9 % 125 mL infusion, Titrated, Last Rate: 2.5 mg/hr (11/25/24 4475)    guaiFENesin (MUCINEX) 12 hr tablet 1,200 mg, BID PRN    metoprolol succinate (TOPROL-XL) 24 hr tablet 125 mg, Daily    pantoprazole (PROTONIX) EC tablet 40 mg, Early Morning    polyethylene glycol (MIRALAX) packet 17 g, Daily PRN    senna-docusate sodium (SENOKOT S) 8.6-50 mg per tablet 1 tablet, HS    Administrative Statements   Today, Patient Was Seen By: Sánchez Arndt MD      **Please Note: This note may have been constructed using a voice recognition system.**

## 2024-11-26 NOTE — ASSESSMENT & PLAN NOTE
5 day h/o sneezing, scratchy throat, cough and nasal congestion. Was prescribed promethazine syrup and prednisone 40 mg for 7 days at urgent care clinic, he is on day 3 of prednisone.  Will treat supportively off prednisone.    Plan:  Tessalon Perles  Mucinex  Follow clinically

## 2024-11-26 NOTE — UTILIZATION REVIEW
"Initial Clinical Review    Admission: Date/Time/Statement:   Admission Orders (From admission, onward)       Ordered        11/25/24 1808  INPATIENT ADMISSION  Once                          Orders Placed This Encounter   Procedures    INPATIENT ADMISSION     Standing Status:   Standing     Number of Occurrences:   1     Level of Care:   Med Surg [16]     Estimated length of stay:   More than 2 Midnights     Certification:   I certify that inpatient services are medically necessary for this patient for a duration of greater than two midnights. See H&P and MD Progress Notes for additional information about the patient's course of treatment.     ED Arrival Information       Expected   -    Arrival   11/25/2024 15:24    Acuity   Emergent              Means of arrival   Ambulance    Escorted by   [facility]    Service   Hospitalist    Admission type   Emergency              Arrival complaint   EMS - AFIB             Chief Complaint   Patient presents with    Atrial Fibrillation     Pt reports \"strenuous activity walking up and down stairs\" this morning and felt SOB. Heart palpitations began this afternoon. Denies CP.       Initial Presentation: 44 y.o. male with a PMH of morbid obesity, HTN, prediabetes, who presents with palpitations. Patient was admitted in March for new onset A-fib with RVR, initially in rapid A-fib heart rates 160-180 no clear exacerbating factors including thyroid disease, underlying heart disease, infection or alcohol use was found at the time and patient converted to sinus rhythm after cardioversion. 3/22 was started on metoprolol 50 mg twice daily and Eliquis 5 mg twice daily. On review of patient's cardiology office visit in July, patient was offered a 2-week cardiac rhythm monitor and wanted to hold off on that time.  Was switched to metoprolol XL 50 mg to metoprolol  mg daily due to complaints of palpitations, but was in NSR in the office. Since Wednesday with sneezing, scratchy " "throat, cough and nasal congestion. Was prescribed promethazine syrup and prednisone 40 mg for 7 days at urgent care, he is on day 3.  Around 2pm he was eating and then his heart started pounding, he stated this felt similar to his March episode and d/t concern of the persistent irregular rhythm, he presented to the ED for further evaluation. He states that his heart has \"pounding\" over the past couple months but today he was concerned because of the irregular rhythm. Plan: Inpatient admission for evaluation and treatment of Afib with RVR, pulmonary vascular congestion, morbid obesity, prediabetes, HTN: Cardizem drip, continue Eliquis, telemetry, Cardiology consult, NPO at [facility] for potential cardioversion if patient does not convert to sinus, follow BNP and BMP, Tessalon Perles, Mucinex, CBG in am, PT/OT eval, increase Toprol XL from 100 mg to 125 mg.     Anticipated Length of Stay/Certification Statement: Patient will be admitted on an inpatient basis with an anticipated length of stay of greater than 2 midnights secondary to afib with RVR.     Date: 11/26   Day 2:     Cardiology consult: Heart rates look well-controlled at least on on Toprol 125 and Cardizem is currently at 2.5 mg. I will check a transthoracic echocardiogram. Prior to transitioning to oral Cardizem on going to follow-up with his LV function. Consider cardioversion tomorrow. We may need to consider antiarrhythmic however to help maintain sinus rhythm. Hopefully he will not need a SANDHYA. He reports 100% compliance of Eliquis for the past 4 weeks.     Internal medicine: Continue diltiazem drip at 2.5 mg, Toprol 125 mg, Eliquis, telemetry, consider cardioversion tomorrow, Tessalon Perles, Mucinex, will require outpatient sleep study.     ED Treatment-Medication Administration from 11/25/2024 1524 to 11/25/2024 2051         Date/Time Order Dose Route Action     11/25/2024 1627 diltiazem (CARDIZEM) injection 42.5 mg 42.5 mg Intravenous Given     11/25/2024 " 1741 diltiazem (CARDIZEM) 125 mg in sodium chloride 0.9 % 125 mL infusion 2.5 mg/hr Intravenous New Bag     11/25/2024 1943 apixaban (ELIQUIS) tablet 5 mg 5 mg Oral Given     11/25/2024 1943 metoprolol succinate (TOPROL-XL) 24 hr tablet 25 mg 25 mg Oral Given            Scheduled Medications:  apixaban, 5 mg, Oral, BID  metoprolol succinate, 125 mg, Oral, Daily  pantoprazole, 40 mg, Oral, Early Morning  senna-docusate sodium, 1 tablet, Oral, HS      Continuous IV Infusions:  diltiazem, 1-15 mg/hr, Intravenous, Titrated      PRN Meds:  acetaminophen, 650 mg, Oral, Q4H PRN  benzonatate, 200 mg, Oral, TID PRN  guaiFENesin, 1,200 mg, Oral, BID PRN  polyethylene glycol, 17 g, Oral, Daily PRN      ED Triage Vitals   Temperature Pulse Respirations Blood Pressure SpO2 Pain Score   11/25/24 1634 11/25/24 1532 11/25/24 1531 11/25/24 1531 11/25/24 1531 11/25/24 2057   98.4 °F (36.9 °C) (!) 131 20 141/89 95 % 4     Weight (last 2 days)       Date/Time Weight    11/26/24 0510 166 (366.63)            Vital Signs (last 3 days)       Date/Time Temp Pulse Resp BP MAP (mmHg) SpO2 O2 Device Patient Position - Orthostatic VS Pain    11/26/24 0900 -- -- -- -- -- -- None (Room air) -- No Pain    11/26/24 08:06:08 -- 95 -- 142/78 99 98 % -- -- No Pain    11/25/24 2300 -- 89 -- -- -- -- -- -- --    11/25/24 21:14:10 98.3 °F (36.8 °C) 89 20 123/79 94 92 % None (Room air) Lying --    11/25/24 2057 -- -- -- -- -- -- -- -- 4    11/25/24 1948 -- 107 -- 130/71 -- -- -- -- --    11/25/24 1943 -- 105 -- 125/73 -- -- -- -- --    11/25/24 1914 -- 103 -- 128/72 -- -- -- -- --    11/25/24 1828 -- 106 -- 120/62 -- -- -- -- --    11/25/24 1815 -- 103 20 120/62 85 92 % -- -- --    11/25/24 1800 -- 105 -- 131/68 -- -- -- -- --    11/25/24 1745 -- 104 20 131/68 93 93 % -- -- --    11/25/24 1741 -- 105 -- 133/64 -- -- -- -- --    11/25/24 1730 -- 103 20 138/65 94 97 % -- -- --    11/25/24 1715 -- 103 20 117/60 82 92 % -- -- --    11/25/24 1700 -- 101 20  140/63 91 92 % -- -- --    11/25/24 1637 -- 88 20 123/58 -- 95 % -- -- --    11/25/24 1634 98.4 °F (36.9 °C) -- -- -- -- -- -- -- --    11/25/24 1630 -- 137 18 121/69 90 97 % -- -- --    11/25/24 1532 -- 131 -- -- -- -- -- -- --    11/25/24 1531 -- -- 20 141/89 -- 95 % -- -- --              Pertinent Labs/Diagnostic Test Results:   Radiology:  XR chest 2 views   Final Interpretation by Cornell Weiss MD (11/25 1641)      Prominence of pulmonary vasculature suggesting mild pulmonary vascular congestive change.      Workstation performed: [id number]           Cardiology:  ECG 12 lead   Final Result by Slick Weaver MD (11/26 0913)   Atrial fibrillation   Abnormal ECG   When compared with ECG of 25-Nov-2024 15:34,   No significant change was found   Confirmed by Slick Weaver ([id number]) on 11/26/2024 9:13:04 AM        GI:  No orders to display       Results from last 7 days   Lab Units 11/25/24  1942   SARS-COV-2  Negative     Results from last 7 days   Lab Units 11/26/24  0450 11/25/24  1617   WBC Thousand/uL 10.78* 11.23*   HEMOGLOBIN g/dL 14.3 14.9   HEMATOCRIT % 43.8 46.9   PLATELETS Thousands/uL 208 238   TOTAL NEUT ABS Thousands/µL  --  9.88*         Results from last 7 days   Lab Units 11/26/24  0450 11/25/24  1848 11/25/24  1617   SODIUM mmol/L 141  --  139   POTASSIUM mmol/L 3.3*  --  4.6   CHLORIDE mmol/L 103  --  103   CO2 mmol/L 31  --  29   ANION GAP mmol/L 7  --  7   BUN mg/dL 15  --  15   CREATININE mg/dL 0.66  --  0.78   EGFR ml/min/1.73sq m 115  --  108   CALCIUM mg/dL 8.8  --  9.3   MAGNESIUM mg/dL  --  2.1  --      Results from last 7 days   Lab Units 11/25/24  1617   AST U/L 28   ALT U/L 18   ALK PHOS U/L 35   TOTAL PROTEIN g/dL 8.0   ALBUMIN g/dL 4.4   TOTAL BILIRUBIN mg/dL 0.39         Results from last 7 days   Lab Units 11/26/24  0450 11/25/24  1617   GLUCOSE RANDOM mg/dL 79 103           Results from last 7 days   Lab Units 11/25/24  1848 11/25/24  1617   HS TNI 0HR ng/L  --  5   HS TNI  2HR ng/L 10  --    HSTNI D2 ng/L 5  --          Results from last 7 days   Lab Units 11/25/24  1617   PROTIME seconds 15.2*   INR  1.13   PTT seconds 28     Results from last 7 days   Lab Units 11/25/24  1617   TSH 3RD GENERATON uIU/mL 0.771     Results from last 7 days   Lab Units 11/26/24  0450   PROCALCITONIN ng/ml <0.05           Results from last 7 days   Lab Units 11/25/24  1617   BNP pg/mL 86           Results from last 7 days   Lab Units 11/25/24  1942   INFLUENZA A PCR  Negative   INFLUENZA B PCR  Negative   RSV PCR  Negative         Past Medical History:   Diagnosis Date    ADILSON (acute kidney injury) (HCC)     Anxiety     Atrial fibrillation (HCC)     GERD (gastroesophageal reflux disease)     Hypertension     Kidney stone     Murmur, cardiac     Stress incontinence     Urethral stricture     Use of cane as ambulatory aid     as needed    Wears glasses      Present on Admission:   HTN (hypertension)   Morbid obesity (HCC)      Admitting Diagnosis: Chest pain [R07.9]  Atrial fibrillation with rapid ventricular response (HCC) [I48.91]  Age/Sex: 44 y.o. male    Network Utilization Review Department  ATTENTION: Please call with any questions or concerns to [phone] and carefully listen to the prompts so that you are directed to the right person. All voicemails are confidential.   For Discharge needs, contact Care Management DC Support Team at [phone] opt. 2  Send all requests for admission clinical reviews, approved or denied determinations and any other requests to dedicated fax number below belonging to the campus where the patient is receiving treatment. List of dedicated fax numbers for the Facilities:  FACILITY NAME UR FAX NUMBER   ADMISSION DENIALS (Administrative/Medical Necessity) [phone]   DISCHARGE SUPPORT TEAM (NETWORK) [phone]   PARENT CHILD HEALTH (Maternity/NICU/Pediatrics) [phone]   [facility] [phone]   [facility]  [phone]   [facility] [phone]   [facility] [phone]   [facility] [phone]   [facility] [phone]   [facility] [phone]   [facility] [phone]   [facility] [phone]   [facility] [phone]   [facility] [phone]   [facility] [phone]

## 2024-11-26 NOTE — ASSESSMENT & PLAN NOTE
Mild leukocytosis.  Tachycardia.  Influenza A/B/RSV/COVID-negative  Procalcitonin less than 0.05    Recommendations  Heart rates look well-controlled at least on on Toprol 125 and Cardizem is currently at 2.5 mg.  I will check a transthoracic echocardiogram.  Prior to transitioning to oral Cardizem on going to follow-up with his LV function.   Consider cardioversion tomorrow.  We may need to consider antiarrhythmic however to help maintain sinus rhythm.  Hopefully he will not need a SANDHYA.  He reports 100% ompliance of Eliquis for the past 4 weeks  Strongly encouraged sleep study with treatment if indicated  If he has recurrent atrial fibrillation would then consider referral for EP for possible ablation  Eventual ischemia evaluation. Timing TBD.

## 2024-11-26 NOTE — HOSPITAL COURSE
Patient presented with chest pain and shortness of breath found to be in A-fib with RVR with pulse at 131.  EKG showed no ST changes, patient was subsequently placed on a Cardizem drip, heart rate has trended down averaging around high 90s.  Patient was given Lasix 20 mg from pulmonary congestions.  Patient continued to be in A-fib rhythm.  Evaluated by cardiology, subsequently underwent cardioversion, converted to sinus rhythm. Nuclear test, shows small apical infarct with moderate clarisa-infarct ischemia.

## 2024-11-26 NOTE — OCCUPATIONAL THERAPY NOTE
Occupational Therapy Evaluation     Patient Name: Maurilio Connors  Today's Date: 11/26/2024  Problem List  Principal Problem:    Atrial fibrillation with rapid ventricular response (HCC)  Active Problems:    HTN (hypertension)    Morbid obesity (HCC)    Pulmonary vascular congestion    Prediabetes    Nonspecific syndrome suggestive of viral illness    Past Medical History  Past Medical History:   Diagnosis Date    ADILSON (acute kidney injury) (HCC)     Anxiety     Atrial fibrillation (HCC)     GERD (gastroesophageal reflux disease)     Hypertension     Kidney stone     Murmur, cardiac     Stress incontinence     Urethral stricture     Use of cane as ambulatory aid     as needed    Wears glasses      Past Surgical History  Past Surgical History:   Procedure Laterality Date    CHOLECYSTECTOMY      2004    CYSTOSCOPY N/A 10/19/2021    Procedure: CYSTOSCOPY;  Surgeon: Pilo Horton MD;  [facility]: [facility];  Service: Urology    UT CYSTO BLADDER W/URETERAL CATHETERIZATION Left 12/29/2021    Procedure: CYSTOSCOPY RETROGRADE PYELOGRAM WITH INSERTION STENT URETERAL LEFT, DVIU;  Surgeon: Ayush Holland MD;  Location: [facility];  Service: Urology    UT CYSTO/URETERO W/LITHOTRIPSY &INDWELL STENT INSRT Left 1/14/2022    Procedure: CYSTOSCOPY, left URETEROSCOPY, removal of migrated stent, insertion new left ureteral stent placement ;  Surgeon: Ayush Holland MD;  Location: [facility];  Service: Urology    UT CYSTO/URETERO W/LITHOTRIPSY &INDWELL STENT INSRT Left 2/23/2022    Procedure: CYSTOSCOPY, URETEROSCOPY, BASKET EXTRACTION, STENT EXCHANGE LEFT URETER;  Surgeon: Ayush Holland MD;  Location: [facility];  Service: Urology    UT CYSTOURETHROSCOPY W/INTERNAL URETHROTOMY N/A 10/19/2021    Procedure: DIRECT VISUAL INTERAL URETHROTOMY (DVIU);  Surgeon: Pilo Horton MD;  [facility];  Service: Urology         11/26/24 1340   OT Last Visit   OT Visit Date 11/26/24   Note Type   Note type Evaluation  "  Pain Assessment   Pain Assessment Tool 0-10   Pain Score No Pain   Restrictions/Precautions   Weight Bearing Precautions Per Order No   Other Precautions Pain  (neck fixed laterally to the right and fwd flexed. Reports an injury in 2013 when transferring his mother. Refused OPPT.)   Home Living   Type of Home Apartment  (5th floor)   Home Layout One level;Performs ADLs on one level;Able to live on main level with bedroom/bathroom;Access;Elevator   Bathroom Shower/Tub Tub/shower unit   Bathroom Toilet Standard   Bathroom Equipment Other (Comment)  (none)   Home Equipment Walker;Cane;Hospital bed   Additional Comments Pt sleeps in a hospital bed. Ambulates w/ SPC intermittently at baseline. Mostly sedentary. Will visit mother on the 3rd level of the apt complex daily and provide physical assistance as she is a bilateral amputee.   Prior Function   Level of [place] Independent with ADLs;Independent with functional mobility;Needs assistance with IADLS   Lives With Alone   Receives Help From Family  (siblings, 1 is local but \"busy\")   IADLs Family/Friend/Other provides transportation;Independent with meal prep;Independent with medication management  (meals on wheels for lunch ; [employer] for transportation. Has in unit laundry)   Falls in the last 6 months 1 to 4  (x2)   Vocational Unemployed   Lifestyle   Autonomy Pt lives alone in an apt complex. Independent with ADLs, basic IADLs and FELICIANO for fnxl mobility w/ use of AD prn. (-) driving and (+) falls   Reciprocal Relationships Supportive mother and siblings   Service to Others unemployed   Intrinsic Gratification watching T.V.   General   Additional Pertinent History Pt admit with chest pain, shortness of breat and palpitations. Found  to be in A-fib RVR at a rate in the 120s to 140s. Chest x-ray showed mild pulmonary edema. PMH of morbid obesity, HTN, prediabetes   Family/Caregiver Present No   Subjective   Subjective \"I help my mom get in and out of bed most " "days\"   ADL   Where Assessed Other (Comment)  (standing at the EOB)   Eating Assistance 7  Independent   Grooming Assistance 7  Independent   UB Bathing Assistance 6  Modified Independent   LB Bathing Assistance 5  Supervision/Setup   UB Dressing Assistance 6  Modified independent   LB Dressing Assistance 5  Supervision/Setup   LB Dressing Deficit Thread RLE into underwear;Thread LLE into underwear;Pull up over hips;Steadying  (abbie/doff underwear in standing w/ intermittent support of UE on bedrail. No overt LOB or signs of instability during dynamic standing tasks.)   Toileting Assistance  5  Supervision/Setup   Bed Mobility   Supine to Sit 6  Modified independent   Additional items HOB elevated   Sit to Supine   (NT: OOB to recliner chair)   Additional Comments Mild lightheadedness w/ postural changes, quickly resolving.   Transfers   Sit to Stand 5  Supervision   Additional items Increased time required;Verbal cues   Stand to Sit 5  Supervision   Additional items Increased time required;Verbal cues   Additional Comments No use of AD. No overt LOB or signs of instability.   Functional Mobility   Functional Mobility 5  Supervision   Additional Comments for functional household distance in the room w/o use of AD. No LOB or signs of instability w/ directional changes.   Additional items   (none)   Balance   Static Sitting Normal   Dynamic Sitting Good   Static Standing Fair   Dynamic Standing Fair -   Activity Tolerance   Activity Tolerance Patient tolerated treatment well   Medical Staff Made Aware Spoke with CM, PT   Nurse Made Aware Spoke with RN pre/post   RUE Assessment   RUE Assessment WFL   LUE Assessment   LUE Assessment WFL   Hand Function   Gross Motor Coordination Functional   Fine Motor Coordination Functional   Sensation   Light Touch No apparent deficits  (pt denies)   Cognition   Overall Cognitive Status WFL   Arousal/Participation Alert;Responsive;Cooperative  (flat affect)   Attention Within " functional limits   Orientation Level Oriented X4   Memory Within functional limits   Following Commands Follows all commands and directions without difficulty   Comments Pt ID via wristband, name and . Pt is pleasent and cooperative. Good insight into current limitiations and deficits.   Assessment   Prognosis Good   Assessment Patient is a 44 y.o. male seen for OT evaluation following admission on 2024  s/p Atrial fibrillation with rapid ventricular response (HCC). Please see above for comprehensive list of comorbidities and significant PMHx impacting functional performance. Upon initial evaluation, pt appears to be functioning close to/or at baseline with functional mobility and ADL tasks. The AM-PAC & Barthel Index outcome tools were used to assist in determining pt safety w/self care /mobility and appropriate d/c recommendations, see above for score. Personal/Environmental factors impacting D/C include: (+) Hx of falls , decreased caregiver status , decreased social/family support within the home, High fall risk , and health management. Supporting factors include: able to maintain FFSU, support system available, and attitude towards recovery. No further acute OT needs identified at this time. Recommend continued active ADL participation and mobilization with hospital staff while in the hospital to increase pt’s endurance and strength upon D/C. From OT standpoint, recommend D/C Level IIII (No post-acute rehabilitation needs)  with family support when medically cleared. D/C pt from OT caseload at this time.   Goals   Patient Goals to go home   Plan   OT Frequency Eval only   Discharge Recommendation   Rehab Resource Intensity Level, OT No post-acute rehabilitation needs   AM-PAC Daily Activity Inpatient   Lower Body Dressing 3   Bathing 3   Toileting 3   Upper Body Dressing 4   Grooming 4   Eating 4   Daily Activity Raw Score 21   Daily Activity Standardized Score (Calc for Raw Score >=11) 44.27   AM-PAC  Applied Cognition Inpatient   Following a Speech/Presentation 4   Understanding Ordinary Conversation 4   Taking Medications 4   Remembering Where Things Are Placed or Put Away 4   Remembering List of 4-5 Errands 4   Taking Care of Complicated Tasks 4   Applied Cognition Raw Score 24   Applied Cognition Standardized Score 62.21   Barthel Index   Feeding 10   Bathing 5   Grooming Score 5   Dressing Score 10   Bladder Score 10   Bowels Score 10   Toilet Use Score 10   Transfers (Bed/Chair) Score 10   Mobility (Level Surface) Score 10   Stairs Score 5   Barthel Index Score 85   End of Consult   Education Provided Yes   Patient Position at End of Consult Bedside chair;Bed/Chair alarm activated;All needs within reach   Nurse Communication Nurse aware of consult     Roxana Dickens MS OTR/L   [place] Licensure# [id number]

## 2024-11-26 NOTE — ASSESSMENT & PLAN NOTE
Patient with history of PAF.  Diagnosed 3/2024.  Status post cardioversion at that time with restoration of sinus rhythm.  UT at that time LV function normal.  No significant valvular heart disease.  Left and right atrial size normal.  At last follow-up visit with Dr. Brownlee  7/2024 patient with intermittent palpitations.  Declined an event recorder.  Presented with palpitations, ECG atrial fibrillation heart rate 120 bpm  Prior to admission he was on Toprol 100 mg daily  Negative troponins.   Admitting potassium 4.6 with a magnesium of 2.1.  Mild leukocytosis.  TSH 0.771.    Started on  IV Cardizem gtt after bolus .  Heart rate well-controlled this morning on 2.5 mg IV Cardizem along with beta-blocker- on Toprol 125 mg daily ( increased from home dose)   Given IV Lasix 20 mg for mild pulmonary vascular congestion.  Diuresed 2500 cc

## 2024-11-26 NOTE — UTILIZATION REVIEW
NOTIFICATION OF INPATIENT ADMISSION   AUTHORIZATION REQUEST   SERVICING FACILITY:   [place] [address]  Tax ID: [id number]  NPI: [id number]   ATTENDING PROVIDER:  Attending Name and NPI#: Jacinto Gallo Md [[id number]]  Address: [address] [place] [address]  Phone: [phone]     ADMISSION INFORMATION:  Place of Service: Inpatient [facility]  Place of Service Code: 21  Inpatient Admission Date/Time: 11/25/24  6:08 PM  Discharge Date/Time: No discharge date for patient encounter.  Admitting Diagnosis Code/Description:  Chest pain [R07.9]  Atrial fibrillation with rapid ventricular response (HCC) [I48.91]     UTILIZATION REVIEW CONTACT:  Shira Mccann Utilization   Network Utilization Review Department  Phone: [phone]  Fax: [phone]  Email: [email or web address]  Contact for approvals/pending authorizations, clinical reviews, and discharge.     PHYSICIAN ADVISORY SERVICES:  Medical Necessity Denial & Auge-bb-Jzzk Review  Phone: [phone]  Fax: [phone]  Email: [email or web address]     DISCHARGE SUPPORT TEAM:  For Patients Discharge Needs & Updates  Phone: [phone] opt. 2 Fax: [phone]  Email: [email or web address]

## 2024-11-26 NOTE — PHYSICAL THERAPY NOTE
Physical Therapy Evaluation    Patient's Name: Maurilio Connors    Admitting Diagnosis  Chest pain [R07.9]  Atrial fibrillation with rapid ventricular response ([facility]) [I48.91]    Problem List  Patient Active Problem List   Diagnosis    Difficulty urinating    HTN (hypertension)    SIRS (systemic inflammatory response syndrome) (HCC)    Renal calculus    Urethral stricture    Left ureteral stone    ADILSON (acute kidney injury) (HCC)    Constipation    Gastroesophageal reflux disease    Anxiety    Morbid obesity (HCC)    Atrial fibrillation with rapid ventricular response (HCC)    Hypomagnesemia    Pulmonary vascular congestion    Prediabetes    Nonspecific syndrome suggestive of viral illness       Past Medical History  Past Medical History:   Diagnosis Date    ADILSON (acute kidney injury) (HCC)     Anxiety     Atrial fibrillation (HCC)     GERD (gastroesophageal reflux disease)     Hypertension     Kidney stone     Murmur, cardiac     Stress incontinence     Urethral stricture     Use of cane as ambulatory aid     as needed    Wears glasses        Past Surgical History  Past Surgical History:   Procedure Laterality Date    CHOLECYSTECTOMY      2004    CYSTOSCOPY N/A 10/19/2021    Procedure: CYSTOSCOPY;  Surgeon: Pilo Horton MD;  [facility];  Service: Urology    RI CYSTO BLADDER W/URETERAL CATHETERIZATION Left 12/29/2021    Procedure: CYSTOSCOPY RETROGRADE PYELOGRAM WITH INSERTION STENT URETERAL LEFT, DVIU;  Surgeon: Ayush Holland MD;  [facility]: [facility];  Service: Urology    RI CYSTO/URETERO W/LITHOTRIPSY &INDWELL STENT INSRT Left 1/14/2022    Procedure: CYSTOSCOPY, left URETEROSCOPY, removal of migrated stent, insertion new left ureteral stent placement ;  Surgeon: Ayush Holland MD;  [facility];  Service: Urology    RI CYSTO/URETERO W/LITHOTRIPSY &INDWELL STENT INSRT Left 2/23/2022    Procedure: CYSTOSCOPY, URETEROSCOPY, BASKET EXTRACTION, STENT EXCHANGE LEFT URETER;  Surgeon: Ayush  MD Skyler;  [facility]: [facility];  Service: Urology    AK CYSTOURETHROSCOPY W/INTERNAL URETHROTOMY N/A 10/19/2021    Procedure: DIRECT VISUAL INTERAL URETHROTOMY (DVIU);  Surgeon: Pilo Horton MD;  [facility];  Service: Urology        24 1324   PT Last Visit   PT Visit Date 24   Note Type   Note type Evaluation   Pain Assessment   Pain Assessment Tool 0-10   Pain Score No Pain   Restrictions/Precautions   Weight Bearing Precautions Per Order No   Other Precautions Cognitive;Chair Alarm;Bed Alarm;Fall Risk;Telemetry   Home Living   Type of Home Apartment   Home Layout One level;Elevator   Bathroom Shower/Tub Tub/shower unit   Bathroom Toilet Standard   Home Equipment Walker;Cane   Prior Function   Lives With Alone  (mothes lives in same building)   IADLs Family/Friend/Other provides transportation;Independent with medication management;Independent with meal prep  (meals on wheels for lunch)   Falls in the last 6 months 1 to 4  (x2)   Vocational Unemployed   General   Family/Caregiver Present No   Cognition   Orientation Level Oriented X4   Following Commands Follows one step commands with increased time or repetition   Comments pt ID by wristband, name and    Subjective   Subjective pt agreeable to PT eval   RLE Assessment   RLE Assessment WFL  (grossly 4/5 with mobility)   LLE Assessment   LLE Assessment WFL  (grossly 4/5 with mobility)   Bed Mobility   Supine to Sit 6  Modified independent   Additional items HOB elevated   Additional Comments sits EOB supervision, denies light headedness/dizziness   Transfers   Sit to Stand 5  Supervision   Additional items Increased time required   Stand to Sit 5  Supervision   Additional items Increased time required;Verbal cues   Additional Comments wide SUKHJINDER, denies light headedness/dizziness with transitions   Ambulation/Elevation   Gait pattern Decreased foot clearance;Wide SUKHJINDER;Short stride;Step through pattern   Gait Assistance 5   "Supervision   Assistive Device None   Distance 25'x4(within room)   Stair Management Assistance Not tested  (elevator to 5th floor apartment at baseline, states elevator is now functional)   Balance   Static Sitting Fair +   Dynamic Sitting Fair   Static Standing Fair -   Dynamic Standing Fair -   Ambulatory Fair -   Activity Tolerance   Activity Tolerance Patient tolerated treatment well   Medical Staff Made Aware spoke with CM   Nurse Made Aware DINESH pratt pre/post   Assessment   Prognosis Fair   Assessment Pt is a 44 y.o. male seen for PT evaluation s/p admit to [facility] on 11/25/2024. Pt was admitted with a primary dx of: chest pain, afib with RVR.  PT now consulted for assessment of mobility and d/c needs. Pt with Up and OOB as tolerated orders.  Pts current comorbidities and personal factors effecting treatment include: HTN, BMI, GERD, atrial fibrillation. Pts current clinical presentation is Unstable/Unpredictable (high complexity) due to Ongoing medical management for primary dx, Decreased activity tolerance compared to baseline, Ongoing telemetry monitoring, Cog status. Prior to admission, pt was independent without AD. Upon evaluation, pt currently is requiring Supervision for bed mobility; Supervision for transfers and Supervision for ambulation 25'x4 ft w/ no AD.Per pt he feels he is at or very near his functional baseline. Pt admits to not being very active at baseline and states he usually \"putz\" around. Given pt is ambulating without AD and steady gait, he does not present with a need for IPPT services At conclusion of PT session pt returned back in chair, all needs in reach, and RN notified of session findings/recommendations with phone and call bell within reach. Pt denies any further questions at this time. Recommend Level III (Minimum Resource Intensity)  upon hospital D/C.   Goals   Patient Goals to go home   Plan   PT Frequency Other (Comment)  (1x PT eval only)   Discharge " Recommendation   Rehab Resource Intensity Level, PT III (Minimum Resource Intensity)   AM-PAC Basic Mobility Inpatient   Turning in Flat Bed Without Bedrails 4   Lying on Back to Sitting on Edge of Flat Bed Without Bedrails 4   Moving Bed to Chair 4   Standing Up From Chair Using Arms 4   Walk in Room 3   Climb 3-5 Stairs With Railing 3   Basic Mobility Inpatient Raw Score 22   Basic Mobility Standardized Score 47.4   [facility] Highest Level Of Mobility   -HLM Goal 7: Walk 25 feet or more   -HLM Achieved 7: Walk 25 feet or more   End of Consult   Patient Position at End of Consult Bedside chair;All needs within reach     The patient's AM-PAC Basic Mobility Inpatient Short Form Raw Score is 22. A Raw score of greater than 16 suggests the patient may benefit from discharge to home. Please also refer to the recommendation of the Physical Therapist for safe discharge planning.  Oskar Davis, PT

## 2024-11-26 NOTE — ASSESSMENT & PLAN NOTE
Outpatient sleep study? Did not because of transportation issues. Patient uses cane to ambulate, 375lb Body mass index is 51.13 kg/m².     Plan:  PT/OT  Encourage patient to pursue outpatient sleep study

## 2024-11-26 NOTE — CONSULTS
Consultation - Cardiology Team 1  Maurilio Connors 44 y.o. male MRN: [id number]  Unit/Bed#: S -01 Encounter: [id number]  Assessment & Plan  Atrial fibrillation with rapid ventricular response (HCC)  Patient with history of PAF.  Diagnosed 3/2024.  Status post cardioversion at that time with restoration of sinus rhythm.  UT at that time LV function normal.  No significant valvular heart disease.  Left and right atrial size normal.  At last follow-up visit with Dr. Brownlee  7/2024 patient with intermittent palpitations.  Declined an event recorder.  Presented with palpitations, ECG atrial fibrillation heart rate 120 bpm  Prior to admission he was on Toprol 100 mg daily  Negative troponins.   Admitting potassium 4.6 with a magnesium of 2.1.  Mild leukocytosis.  TSH 0.771.    Started on  IV Cardizem gtt after bolus .  Heart rate well-controlled this morning on 2.5 mg IV Cardizem along with beta-blocker- on Toprol 125 mg daily ( increased from home dose)   Given IV Lasix 20 mg for mild pulmonary vascular congestion.  Diuresed 2500 cc  HTN (hypertension)  Fairly well-controlled  Morbid obesity (HCC)  BMI 51.13  Pulmonary vascular congestion  In the setting of atrial fibrillation with RVR  Prediabetes    Nonspecific syndrome suggestive of viral illness  Mild leukocytosis.  Tachycardia.  Influenza A/B/RSV/COVID-negative  Procalcitonin less than 0.05    Recommendations  Heart rates look well-controlled at least on on Toprol 125 and Cardizem is currently at 2.5 mg.  I will check a transthoracic echocardiogram.  Prior to transitioning to oral Cardizem on going to follow-up with his LV function.   Consider cardioversion tomorrow.  We may need to consider antiarrhythmic however to help maintain sinus rhythm.  Hopefully he will not need a SANDHYA.  He reports 100% ompliance of Eliquis for the past 4 weeks  Strongly encouraged sleep study with treatment if indicated  If he has recurrent atrial fibrillation would then consider  "referral for EP for possible ablation  Eventual ischemia evaluation. Timing TBD.       History of Present Illness   Physician Requesting Consult: Jacinto Gallo MD  Reason for Consult / Principal Problem: Atrial fibrillation with RVR    HPI: Maurilio Connors is a 44 y.o. year old male with hypertension, hypertriglyceridemia, GERD, morbid obesity, paroxysmal atrial fibrillation who presents with palpitations x 1 day.  Mild increased shortness of breath.  Episode of lightheadedness.  Presented with atrial fibrillation with heart rate in the 120s.  Patient with history of paroxysmal atrial fibrillation diagnosed 3/2024 for which she underwent cardioversion with restoration of sinus rhythm.  He does have an occasional palpitations since.  The episode yesterday he actually had a little bit of some left-sided chest discomfort with radiation down the left arm.  He states this was different from previous.  He is fairly sedentary.  He does take care of his elderly mother at home.    Patient was diagnosed with paroxysmal atrial fibrillation 3/2024.  He underwent SANDHYA cardioversion with restoration of sinus rhythm after 1 300 J shock.  Put on metoprolol tartrate 50 milligram twice a day as well as Eliquis.  TTE at that time LVEF 65%.  Last follow-up was with Dr. Brownlee  7/2024.  Occasional palpitations.  2-week event monitor was offered, pt declined.     Patient has transportation issues.  He has not been able to get a sleep study.  He wakes up frequently during the night for various reasons.  He has recently had a \"cold\" for which she took prednisone.  No stimulants.  Reports that he is been compliant with his Toprol and Eliquis.      Inpatient consult to Cardiology  Consult performed by: JANINE Castle  Consult ordered by: Daniela Daly MD          Review of Systems   Constitutional: Negative.    HENT:  Positive for congestion.    Eyes: Negative.    Respiratory:  Positive for cough and shortness of breath.  "   Cardiovascular:  Positive for chest pain and palpitations.   Gastrointestinal: Negative.    Endocrine: Negative.    Genitourinary: Negative.    Musculoskeletal: Negative.    Allergic/Immunologic: Negative.    Neurological: Negative.    Hematological: Negative.    Psychiatric/Behavioral: Negative.     All other systems reviewed and are negative.      Historical Information   Past Medical History:   Diagnosis Date    DAILSON (acute kidney injury) (HCC)     Anxiety     Atrial fibrillation (HCC)     GERD (gastroesophageal reflux disease)     Hypertension     Kidney stone     Murmur, cardiac     Stress incontinence     Urethral stricture     Use of cane as ambulatory aid     as needed    Wears glasses      Past Surgical History:   Procedure Laterality Date    CHOLECYSTECTOMY      2004    CYSTOSCOPY N/A 10/19/2021    Procedure: CYSTOSCOPY;  Surgeon: Pilo Horton MD;  [facility];  Service: Urology    IN CYSTO BLADDER W/URETERAL CATHETERIZATION Left 12/29/2021    Procedure: CYSTOSCOPY RETROGRADE PYELOGRAM WITH INSERTION STENT URETERAL LEFT, DVIU;  Surgeon: Ayush Holland MD;  [facility];  Service: Urology    IN CYSTO/URETERO W/LITHOTRIPSY &INDWELL STENT INSRT Left 1/14/2022    Procedure: CYSTOSCOPY, left URETEROSCOPY, removal of migrated stent, insertion new left ureteral stent placement ;  Surgeon: Ayush Holland MD;  [facility];  Service: Urology    IN CYSTO/URETERO W/LITHOTRIPSY &INDWELL STENT INSRT Left 2/23/2022    Procedure: CYSTOSCOPY, URETEROSCOPY, BASKET EXTRACTION, STENT EXCHANGE LEFT URETER;  Surgeon: Ayush Holland MD;  [facility];  Service: Urology    IN CYSTOURETHROSCOPY W/INTERNAL URETHROTOMY N/A 10/19/2021    Procedure: DIRECT VISUAL INTERAL URETHROTOMY (DVIU);  Surgeon: Pilo Horton MD;  [facility];  Service: Urology     Social History     Substance and Sexual Activity   Alcohol Use Not Currently     Social History     Substance and Sexual  Activity   Drug Use Never     Social History     Tobacco Use   Smoking Status Former    Types: Cigarettes   Smokeless Tobacco Never     Family History: History reviewed. No pertinent family history.    Meds/Allergies   current meds:   Current Facility-Administered Medications:     acetaminophen (TYLENOL) tablet 650 mg, Q4H PRN    apixaban (ELIQUIS) tablet 5 mg, BID    benzonatate (TESSALON PERLES) capsule 200 mg, TID PRN    diltiazem (CARDIZEM) 125 mg in sodium chloride 0.9 % 125 mL infusion, Titrated, Last Rate: 2.5 mg/hr (11/25/24 2347)    guaiFENesin (MUCINEX) 12 hr tablet 1,200 mg, BID PRN    metoprolol succinate (TOPROL-XL) 24 hr tablet 125 mg, Daily    pantoprazole (PROTONIX) EC tablet 40 mg, Early Morning    polyethylene glycol (MIRALAX) packet 17 g, Daily PRN    senna-docusate sodium (SENOKOT S) 8.6-50 mg per tablet 1 tablet, HS and PTA meds:    Medications Prior to Admission:     acetaminophen (TYLENOL) 325 mg tablet    apixaban (ELIQUIS) 5 mg    docusate sodium (COLACE) 100 mg capsule    metoprolol succinate (TOPROL-XL) 100 mg 24 hr tablet    omeprazole (PriLOSEC) 20 mg delayed release capsule  No Known Allergies    Objective   Vitals: Blood pressure 142/78, pulse 95, temperature 98.3 °F (36.8 °C), temperature source Axillary, resp. rate 20, weight (!) 166 kg (366 lb 10 oz), SpO2 98%.  Orthostatic Blood Pressures      Flowsheet Row Most Recent Value   Blood Pressure 142/78 filed at 11/26/2024 0806   Patient Position - Orthostatic VS Lying filed at 11/25/2024 2114              Intake/Output Summary (Last 24 hours) at 11/26/2024 0927  Last data filed at 11/26/2024 0901  Gross per 24 hour   Intake 240 ml   Output 2696 ml   Net -2456 ml       Invasive Devices       Peripheral Intravenous Line  Duration             Peripheral IV 11/25/24 Left Antecubital <1 day              Drain  Duration             Ureteral Drain/Stent Left ureter 1047 days                    Physical Exam: /78   Pulse 95   Temp 98.3  °F (36.8 °C) (Axillary)   Resp 20   Wt (!) 166 kg (366 lb 10 oz)   SpO2 98%   BMI 51.13 kg/m²   General Appearance:    Alert, cooperative, no distress, morbidly obese   Head:    Normocephalic, no scleral icterus   Eyes:    PERRL   Nose:   Nares normal, septum midline, mucosa normal, no drainage    Throat:   Lips, mucosa, and tongue normal   Neck:   Supple, symmetrical, trachea midline     no carotid bruit or JVD       Lungs:     Clear to auscultation bilaterally, respirations unlabored        Heart:    Irregular rate and rhythm, S1 and S2 normal, no murmur, rub   or gallop       Extremities:   Extremities normal, atraumatic, 1+ edema       Skin:   Skin warm   Neurologic:   Alert and oriented to person place and time.        Lab Results:   Recent Results (from the past 72 hours)   ECG 12 lead    Collection Time: 11/25/24  3:34 PM   Result Value Ref Range    Ventricular Rate 128 BPM    Atrial Rate 187 BPM    ND Interval  ms    QRSD Interval 90 ms    QT Interval 294 ms    QTC Interval 429 ms    P Axis  degrees    QRS Axis 72 degrees    T Wave Axis 12 degrees   CBC and differential    Collection Time: 11/25/24  4:17 PM   Result Value Ref Range    WBC 11.23 (H) 4.31 - 10.16 Thousand/uL    RBC 5.32 3.88 - 5.62 Million/uL    Hemoglobin 14.9 12.0 - 17.0 g/dL    Hematocrit 46.9 36.5 - 49.3 %    MCV 88 82 - 98 fL    MCH 28.0 26.8 - 34.3 pg    MCHC 31.8 31.4 - 37.4 g/dL    RDW 13.5 11.6 - 15.1 %    MPV 10.6 8.9 - 12.7 fL    Platelets 238 149 - 390 Thousands/uL    nRBC 0 /100 WBCs    Segmented % 88 (H) 43 - 75 %    Immature Grans % 1 0 - 2 %    Lymphocytes % 8 (L) 14 - 44 %    Monocytes % 3 (L) 4 - 12 %    Eosinophils Relative 0 0 - 6 %    Basophils Relative 0 0 - 1 %    Absolute Neutrophils 9.88 (H) 1.85 - 7.62 Thousands/µL    Absolute Immature Grans 0.06 0.00 - 0.20 Thousand/uL    Absolute Lymphocytes 0.94 0.60 - 4.47 Thousands/µL    Absolute Monocytes 0.32 0.17 - 1.22 Thousand/µL    Eosinophils Absolute 0.00 0.00 - 0.61  "Thousand/µL    Basophils Absolute 0.03 0.00 - 0.10 Thousands/µL   Comprehensive metabolic panel    Collection Time: 11/25/24  4:17 PM   Result Value Ref Range    Sodium 139 135 - 147 mmol/L    Potassium 4.6 3.5 - 5.3 mmol/L    Chloride 103 96 - 108 mmol/L    CO2 29 21 - 32 mmol/L    ANION GAP 7 4 - 13 mmol/L    BUN 15 5 - 25 mg/dL    Creatinine 0.78 0.60 - 1.30 mg/dL    Glucose 103 65 - 140 mg/dL    Calcium 9.3 8.4 - 10.2 mg/dL    AST 28 13 - 39 U/L    ALT 18 7 - 52 U/L    Alkaline Phosphatase 35 34 - 104 U/L    Total Protein 8.0 6.4 - 8.4 g/dL    Albumin 4.4 3.5 - 5.0 g/dL    Total Bilirubin 0.39 0.20 - 1.00 mg/dL    eGFR 108 ml/min/1.73sq m   HS Troponin 0hr (reflex protocol)    Collection Time: 11/25/24  4:17 PM   Result Value Ref Range    hs TnI 0hr 5 \"Refer to ACS Flowchart\"- see link ng/L   APTT    Collection Time: 11/25/24  4:17 PM   Result Value Ref Range    PTT 28 23 - 34 seconds   Protime-INR    Collection Time: 11/25/24  4:17 PM   Result Value Ref Range    Protime 15.2 (H) 12.3 - 15.0 seconds    INR 1.13 0.85 - 1.19   TSH    Collection Time: 11/25/24  4:17 PM   Result Value Ref Range    TSH 3RD GENERATON 0.771 0.450 - 4.500 uIU/mL   B-Type Natriuretic Peptide(BNP)    Collection Time: 11/25/24  4:17 PM   Result Value Ref Range    BNP 86 0 - 100 pg/mL   ECG 12 lead    Collection Time: 11/25/24  5:17 PM   Result Value Ref Range    Ventricular Rate 97 BPM    Atrial Rate 78 BPM    CA Interval  ms    QRSD Interval 94 ms    QT Interval 320 ms    QTC Interval 406 ms    P Axis  degrees    QRS Axis 62 degrees    T Wave [place] 8 degrees   HS Troponin I 2hr    Collection Time: 11/25/24  6:48 PM   Result Value Ref Range    hs TnI 2hr 10 \"Refer to ACS Flowchart\"- see link ng/L    Delta 2hr hsTnI 5 <20 ng/L   Magnesium    Collection Time: 11/25/24  6:48 PM   Result Value Ref Range    Magnesium 2.1 1.9 - 2.7 mg/dL   COVID/FLU/RSV    Collection Time: 11/25/24  7:42 PM    Specimen: Nose; Nares   Result Value Ref Range    " SARS-CoV-2 Negative Negative    INFLUENZA A PCR Negative Negative    INFLUENZA B PCR Negative Negative    RSV PCR Negative Negative   CBC    Collection Time: 11/26/24  4:50 AM   Result Value Ref Range    WBC 10.78 (H) 4.31 - 10.16 Thousand/uL    RBC 5.08 3.88 - 5.62 Million/uL    Hemoglobin 14.3 12.0 - 17.0 g/dL    Hematocrit 43.8 36.5 - 49.3 %    MCV 86 82 - 98 fL    MCH 28.1 26.8 - 34.3 pg    MCHC 32.6 31.4 - 37.4 g/dL    RDW 13.5 11.6 - 15.1 %    Platelets 208 149 - 390 Thousands/uL    MPV 9.9 8.9 - 12.7 fL   Basic metabolic panel    Collection Time: 11/26/24  4:50 AM   Result Value Ref Range    Sodium 141 135 - 147 mmol/L    Potassium 3.3 (L) 3.5 - 5.3 mmol/L    Chloride 103 96 - 108 mmol/L    CO2 31 21 - 32 mmol/L    ANION GAP 7 4 - 13 mmol/L    BUN 15 5 - 25 mg/dL    Creatinine 0.66 0.60 - 1.30 mg/dL    Glucose 79 65 - 140 mg/dL    Calcium 8.8 8.4 - 10.2 mg/dL    eGFR 115 ml/min/1.73sq m   Procalcitonin    Collection Time: 11/26/24  4:50 AM   Result Value Ref Range    Procalcitonin <0.05 <=0.25 ng/ml     Imaging: I have personally reviewed pertinent reports.    EKG: Atrial fibrillation heart rate 120 bpm      Code Status: Level 1 - Full Code  Advance Directive and Living Will:      Power of :    POLST:      Counseling / Coordination of Care  Total floor / unit time spent today 45 minutes.  Greater than 50% of total time was spent with the patient and / or family counseling and / or coordination of care.

## 2024-11-26 NOTE — ASSESSMENT & PLAN NOTE
Chest x-ray reveals prominence of pulmonary vasculature suggesting mild pulmonary vascular congestive change.  Patient appears to be mildly volume overloaded with leg edema and crackles.  He is saturating 93% on room air, no complaints of dyspnea or chest pain at this time.      Findings may be 2/2 pulmonary vascular resistance I/s/o BRITTON/obesity hypoventilation syndrome vs left heart disease I/s/o of tachycardia induced cardiomyopathy.  S/p 20 mg IV Lasix    Plan:  Continue follow-up daily labs

## 2024-11-26 NOTE — ASSESSMENT & PLAN NOTE
Patient with history of single episode of symptomatic A-fib with RVR (March 2024) on Toprol 100 mg daily and Eliquis, re-presenting with A-fib with RVR.  Workup in March was overall unrevealing with no clear exacerbating factors including thyroid disease, underlying heart disease, infection or alcohol use.  Lab Results   Component Value Date    [id number] 0.771 11/25/2024      Lab Results   Component Value Date    HGBA1C 5.9 (H) 07/17/2024      POA  EKG A-fib with RVR 150s s/p Cardizem drip, 100s by my evaluation, HD stable  Chest x-ray prominence of pulmonary vasculature suggesting mild pulmonary vascular congestive change  [id number] 2  Likely 2/2 recent viral illness, BRITTON, obesity hypoventilation syndrome, cardiac remodeling I/s/o persistent tachycardia versus medication induced    Plan:  Rate/Rhythm control: Continue diltiazem drip at 2.5 mg toprol 125  AC: Continue Eliquis 5 mg twice daily  Monitoring on telemetry  Cardiology consult appreciated  Consider cardioversion 11/27   Dispo  Needs sleep study upon discharge  Consider referral for EP for possible ablation

## 2024-11-27 ENCOUNTER — ANESTHESIA EVENT (INPATIENT)
Dept: NON INVASIVE DIAGNOSTICS | Facility: HOSPITAL | Age: 44
DRG: 192 | End: 2024-11-27
Payer: COMMERCIAL

## 2024-11-27 ENCOUNTER — APPOINTMENT (INPATIENT)
Dept: NUCLEAR MEDICINE | Facility: HOSPITAL | Age: 44
DRG: 192 | End: 2024-11-27
Payer: COMMERCIAL

## 2024-11-27 LAB
ANION GAP SERPL CALCULATED.3IONS-SCNC: 7 MMOL/L (ref 4–13)
AORTIC ROOT: 2.9 CM
APICAL FOUR CHAMBER EJECTION FRACTION: 60 %
ASCENDING AORTA: 3.4 CM
BSA FOR ECHO PROCEDURE: 2.72 M2
BUN SERPL-MCNC: 16 MG/DL (ref 5–25)
CALCIUM SERPL-MCNC: 8.8 MG/DL (ref 8.4–10.2)
CHEST PAIN STATEMENT: NORMAL
CHLORIDE SERPL-SCNC: 103 MMOL/L (ref 96–108)
CO2 SERPL-SCNC: 29 MMOL/L (ref 21–32)
CREAT SERPL-MCNC: 0.71 MG/DL (ref 0.6–1.3)
ERYTHROCYTE [DISTWIDTH] IN BLOOD BY AUTOMATED COUNT: 13.8 % (ref 11.6–15.1)
FRACTIONAL SHORTENING: 39 (ref 28–44)
GFR SERPL CREATININE-BSD FRML MDRD: 112 ML/MIN/1.73SQ M
GLUCOSE SERPL-MCNC: 90 MG/DL (ref 65–140)
HCT VFR BLD AUTO: 49.8 % (ref 36.5–49.3)
HGB BLD-MCNC: 15.9 G/DL (ref 12–17)
INTERVENTRICULAR SEPTUM IN DIASTOLE (PARASTERNAL SHORT AXIS VIEW): 1.2 CM
INTERVENTRICULAR SEPTUM: 1.2 CM (ref 0.6–1.1)
LAAS-AP2: 23.2 CM2
LAAS-AP4: 22.7 CM2
LEFT ATRIUM SIZE: 4.7 CM
LEFT ATRIUM VOLUME (MOD BIPLANE): 84 ML
LEFT ATRIUM VOLUME INDEX (MOD BIPLANE): 30.9 ML/M2
LEFT INTERNAL DIMENSION IN SYSTOLE: 3 CM (ref 2.1–4)
LEFT VENTRICLE DIASTOLIC VOLUME (MOD BIPLANE): 161 ML
LEFT VENTRICLE DIASTOLIC VOLUME INDEX (MOD BIPLANE): 59.2 ML/M2
LEFT VENTRICLE SYSTOLIC VOLUME (MOD BIPLANE): 61 ML
LEFT VENTRICLE SYSTOLIC VOLUME INDEX (MOD BIPLANE): 22.4 ML/M2
LEFT VENTRICULAR INTERNAL DIMENSION IN DIASTOLE: 4.9 CM (ref 3.5–6)
LEFT VENTRICULAR POSTERIOR WALL IN END DIASTOLE: 1.1 CM
LEFT VENTRICULAR STROKE VOLUME: 76 ML
LV EF: 62 %
LVSV (TEICH): 76 ML
MAX DIASTOLIC BP: 100 MMHG
MAX HR PERCENT: 57 %
MAX HR: 100 BPM
MAX PREDICTED HEART RATE: 174 BPM
MCH RBC QN AUTO: 27.7 PG (ref 26.8–34.3)
MCHC RBC AUTO-ENTMCNC: 31.9 G/DL (ref 31.4–37.4)
MCV RBC AUTO: 87 FL (ref 82–98)
MV E'TISSUE VEL-LAT: 16 CM/S
MV E'TISSUE VEL-SEP: 12 CM/S
NUC STRESS EJECTION FRACTION: 53 %
PLATELET # BLD AUTO: 242 THOUSANDS/UL (ref 149–390)
PMV BLD AUTO: 10.1 FL (ref 8.9–12.7)
POTASSIUM SERPL-SCNC: 3.5 MMOL/L (ref 3.5–5.3)
PROTOCOL NAME: NORMAL
RA PRESSURE ESTIMATED: 10 MMHG
RATE PRESSURE PRODUCT: NORMAL
RBC # BLD AUTO: 5.73 MILLION/UL (ref 3.88–5.62)
REASON FOR TERMINATION: NORMAL
RIGHT ATRIUM AREA SYSTOLE A4C: 23.1 CM2
RIGHT VENTRICLE ID DIMENSION: 4.5 CM
RV PSP: 32 MMHG
SL CV LEFT ATRIUM LENGTH A2C: 5.6 CM
SL CV LV EF: 55
SL CV PED ECHO LEFT VENTRICLE DIASTOLIC VOLUME (MOD BIPLANE) 2D: 111 ML
SL CV PED ECHO LEFT VENTRICLE SYSTOLIC VOLUME (MOD BIPLANE) 2D: 35 ML
SL CV REST NUCLEAR ISOTOPE DOSE: 15.9 MCI
SL CV STRESS NUCLEAR ISOTOPE DOSE: 49.2 MCI
SL CV STRESS RECOVERY BP: NORMAL MMHG
SL CV STRESS RECOVERY HR: 87 BPM
SL CV STRESS RECOVERY O2 SAT: 98 %
SODIUM SERPL-SCNC: 139 MMOL/L (ref 135–147)
STRESS ANGINA INDEX: 0
STRESS BASELINE BP: NORMAL MMHG
STRESS BASELINE HR: 80 BPM
STRESS O2 SAT REST: 98 %
STRESS PEAK HR: 93 BPM
STRESS POST ESTIMATED WORKLOAD: 1 METS
STRESS POST EXERCISE DUR MIN: 3 MIN
STRESS POST EXERCISE DUR SEC: 0 SEC
STRESS POST O2 SAT PEAK: 98 %
STRESS POST PEAK BP: 140 MMHG
STRESS POST PEAK HR: 100 BPM
STRESS POST PEAK SYSTOLIC BP: 142 MMHG
STRESS/REST PERFUSION RATIO: 1.16
TARGET HR FORMULA: NORMAL
TEST INDICATION: NORMAL
TR MAX PG: 22 MMHG
TR PEAK VELOCITY: 2.3 M/S
TRICUSPID ANNULAR PLANE SYSTOLIC EXCURSION: 2.9 CM
TRICUSPID VALVE PEAK REGURGITATION VELOCITY: 2.32 M/S
WBC # BLD AUTO: 10.93 THOUSAND/UL (ref 4.31–10.16)

## 2024-11-27 PROCEDURE — 93016 CV STRESS TEST SUPVJ ONLY: CPT | Performed by: INTERNAL MEDICINE

## 2024-11-27 PROCEDURE — 93017 CV STRESS TEST TRACING ONLY: CPT

## 2024-11-27 PROCEDURE — 99232 SBSQ HOSP IP/OBS MODERATE 35: CPT | Performed by: INTERNAL MEDICINE

## 2024-11-27 PROCEDURE — 78452 HT MUSCLE IMAGE SPECT MULT: CPT

## 2024-11-27 PROCEDURE — 93306 TTE W/DOPPLER COMPLETE: CPT

## 2024-11-27 PROCEDURE — A9502 TC99M TETROFOSMIN: HCPCS

## 2024-11-27 PROCEDURE — 85027 COMPLETE CBC AUTOMATED: CPT

## 2024-11-27 PROCEDURE — 92960 CARDIOVERSION ELECTRIC EXT: CPT

## 2024-11-27 PROCEDURE — 78452 HT MUSCLE IMAGE SPECT MULT: CPT | Performed by: INTERNAL MEDICINE

## 2024-11-27 PROCEDURE — 93018 CV STRESS TEST I&R ONLY: CPT | Performed by: INTERNAL MEDICINE

## 2024-11-27 PROCEDURE — 5A2204Z RESTORATION OF CARDIAC RHYTHM, SINGLE: ICD-10-PCS | Performed by: INTERNAL MEDICINE

## 2024-11-27 PROCEDURE — 92960 CARDIOVERSION ELECTRIC EXT: CPT | Performed by: INTERNAL MEDICINE

## 2024-11-27 PROCEDURE — 80048 BASIC METABOLIC PNL TOTAL CA: CPT

## 2024-11-27 PROCEDURE — 93306 TTE W/DOPPLER COMPLETE: CPT | Performed by: INTERNAL MEDICINE

## 2024-11-27 RX ORDER — LIDOCAINE HYDROCHLORIDE 10 MG/ML
INJECTION, SOLUTION EPIDURAL; INFILTRATION; INTRACAUDAL; PERINEURAL AS NEEDED
Status: DISCONTINUED | OUTPATIENT
Start: 2024-11-27 | End: 2024-11-27

## 2024-11-27 RX ORDER — SODIUM CHLORIDE, SODIUM LACTATE, POTASSIUM CHLORIDE, CALCIUM CHLORIDE 600; 310; 30; 20 MG/100ML; MG/100ML; MG/100ML; MG/100ML
INJECTION, SOLUTION INTRAVENOUS CONTINUOUS PRN
Status: DISCONTINUED | OUTPATIENT
Start: 2024-11-27 | End: 2024-11-27

## 2024-11-27 RX ORDER — POTASSIUM CHLORIDE 1500 MG/1
40 TABLET, EXTENDED RELEASE ORAL ONCE
Status: COMPLETED | OUTPATIENT
Start: 2024-11-27 | End: 2024-11-27

## 2024-11-27 RX ORDER — PROPOFOL 10 MG/ML
INJECTION, EMULSION INTRAVENOUS AS NEEDED
Status: DISCONTINUED | OUTPATIENT
Start: 2024-11-27 | End: 2024-11-27

## 2024-11-27 RX ORDER — DILTIAZEM HYDROCHLORIDE 30 MG/1
30 TABLET, FILM COATED ORAL ONCE
Status: COMPLETED | OUTPATIENT
Start: 2024-11-27 | End: 2024-11-27

## 2024-11-27 RX ORDER — REGADENOSON 0.08 MG/ML
0.4 INJECTION, SOLUTION INTRAVENOUS ONCE
Status: COMPLETED | OUTPATIENT
Start: 2024-11-27 | End: 2024-11-27

## 2024-11-27 RX ADMIN — PROPOFOL 50 MG: 10 INJECTION, EMULSION INTRAVENOUS at 10:16

## 2024-11-27 RX ADMIN — PROPOFOL 50 MG: 10 INJECTION, EMULSION INTRAVENOUS at 10:17

## 2024-11-27 RX ADMIN — DILTIAZEM HYDROCHLORIDE 30 MG: 30 TABLET, FILM COATED ORAL at 05:10

## 2024-11-27 RX ADMIN — PROPOFOL 5 MG: 10 INJECTION, EMULSION INTRAVENOUS at 10:15

## 2024-11-27 RX ADMIN — PANTOPRAZOLE SODIUM 40 MG: 40 TABLET, DELAYED RELEASE ORAL at 05:00

## 2024-11-27 RX ADMIN — REGADENOSON 0.4 MG: 0.08 INJECTION, SOLUTION INTRAVENOUS at 13:25

## 2024-11-27 RX ADMIN — LIDOCAINE HYDROCHLORIDE 100 MG: 10 INJECTION, SOLUTION EPIDURAL; INFILTRATION; INTRACAUDAL at 10:14

## 2024-11-27 RX ADMIN — APIXABAN 5 MG: 5 TABLET, FILM COATED ORAL at 17:43

## 2024-11-27 RX ADMIN — SODIUM CHLORIDE, SODIUM LACTATE, POTASSIUM CHLORIDE, AND CALCIUM CHLORIDE: .6; .31; .03; .02 INJECTION, SOLUTION INTRAVENOUS at 09:48

## 2024-11-27 RX ADMIN — APIXABAN 5 MG: 5 TABLET, FILM COATED ORAL at 07:11

## 2024-11-27 RX ADMIN — POTASSIUM CHLORIDE 40 MEQ: 1500 TABLET, EXTENDED RELEASE ORAL at 07:11

## 2024-11-27 RX ADMIN — METOPROLOL SUCCINATE 125 MG: 100 TABLET, EXTENDED RELEASE ORAL at 07:10

## 2024-11-27 NOTE — DISCHARGE INSTR - AVS FIRST PAGE
Dear Maurilio Connors,     It was our pleasure to care for you here at [facility].  It is our hope that we were always able to exceed the expected standards for your care during your stay.  You were hospitalized due to irregular heart rhythm.  For follow up as well as any medication refills, we recommend that you follow up with your primary care physician. However, at this time we provide for you here, the most important instructions / recommendations at discharge:       Notable Medication Adjustments -   Please start taking metoprolol 125 mg total daily which will be 5 tablets of 25 mg prescription every 24 hours, this medication is for controlling your heart rate  We made no other changes to your medication.  Please continue how you were taking them previously before this admission.    Testing Required after Discharge -   Please complete sleep study for evaluation of obstructive sleep apnea    Important follow up information -   Please follow-up with cardiology, Dr. Brownlee in 2 weeks, office will call you for follow-up, if there is no one calling, please make sure to call our scheduling center listed below  Please follow up with your primary care physician within 1 week(s), to discuss about your recent hospitalization, and any changes in your medications or treatment plans. Ensure you understand your discharge instructions and call your doctor if you experience any worsening symptoms or have questions about your care.    Please follow weight loss management upon discharge    Other Instructions   It is important that you follow-up with your doctors in the upcoming weeks  If you do not hear from our scheduling office or need additional assistance, please call our scheduling [phone]    If you feel like your illness/symptoms are worsening and needs urgent care  wait until your follow-up visit or your primary care physician cannot see you  - You can get same-day medical attention at  urgent care  - If you need a more serious immediate care, please return to the ER       Please review this entire after visit summary as additional general instructions including medication list, appointments, activity, diet, any pertinent wound care, and other additional recommendations from your care team that may be provided for you.  I am honored to be a part of our care journey. I wish you a speedy recovery and the very best of luck in your health endeavors.       Sincerely,     Sánchez Arndt MD        1. Please see the post cardiac catheterization/ angioseal closure device instructions and stent booklet. No heavy lifting, greater than 10 lbs. or strenuous  activity for 48 hrs.  See the post cardiac catheterization/ angioseal closure device instructions.     2.Remove band aid tomorrow.  Shower and wash area- wrist gently with soap and water- beginning tomorrow. Rinse and pat dry.  Apply new water seal band aid.  Repeat this process for 5 days. No powders, creams lotions or antibiotic ointments  for 5 days.  No tub baths, hot tubs or swimming for 5 days.     3.No driving for 2 days

## 2024-11-27 NOTE — ASSESSMENT & PLAN NOTE
Mild leukocytosis.  Tachycardia.  Influenza A/B/RSV/COVID-negative  Procalcitonin less than 0.05    Recommendations  Continue Toprol 125mg daily. Eliquis 5mg every 12 hrs.  F/u with nuclear stress test  Strongly encouraged sleep study with treatment if indicated  Plan for discharge tomorrow if stress test normal.   Message sent to EP office for consultation.

## 2024-11-27 NOTE — PROGRESS NOTES
Progress Note - Hospitalist   Name: Maurilio Connors 44 y.o. male I MRN: [id number]  Unit/Bed#: S -01 I Date of Admission: 11/25/2024   Date of Service: 11/27/2024 I Hospital Day: 2    Assessment & Plan  Atrial fibrillation with rapid ventricular response (HCC)  Patient with history of single episode of symptomatic A-fib with RVR (March 2024) on Toprol 100 mg daily and Eliquis, re-presenting with A-fib with RVR.  Workup in March was overall unrevealing with no clear exacerbating factors including thyroid disease, underlying heart disease, infection or alcohol use.  Lab Results   Component Value Date    [id number] 0.771 11/25/2024      Lab Results   Component Value Date    HGBA1C 5.9 (H) 07/17/2024      POA  EKG A-fib with RVR 150s s/p Cardizem drip, 100s by my evaluation, HD stable  Chest x-ray prominence of pulmonary vasculature suggesting mild pulmonary vascular congestive change  [id number] 2  Likely 2/2 recent viral illness, BRITTON, obesity hypoventilation syndrome, cardiac remodeling I/s/o persistent tachycardia versus medication induced    Plan:  Rate/Rhythm control: Metoprolol 125   AC: Continue Eliquis 5 mg twice daily  Monitoring on telemetry  Cardiology consult appreciated   11/27 - stress echo, cardioversion  Dispo  Needs sleep study upon discharge  Consider referral for EP for possible ablation  Pulmonary vascular congestion  Chest x-ray reveals prominence of pulmonary vasculature suggesting mild pulmonary vascular congestive change.  Patient appears to be mildly volume overloaded with leg edema and crackles.  He is saturating 93% on room air, no complaints of dyspnea or chest pain at this time.      Findings may be 2/2 pulmonary vascular resistance I/s/o BRITTON/obesity hypoventilation syndrome vs left heart disease I/s/o of tachycardia induced cardiomyopathy.  S/p 20 mg IV Lasix    Plan:  Continue follow-up daily labs  Nonspecific syndrome suggestive of viral illness  5 day h/o sneezing, scratchy  throat, cough and nasal congestion. Was prescribed promethazine syrup and prednisone 40 mg for 7 days at urgent care clinic, he is on day 3 of prednisone.  Will treat supportively off prednisone.    Plan:  Teslaney Earles  Mucinex  Follow clinically  Morbid obesity (HCC)  Outpatient sleep study? Did not because of transportation issues. Patient uses cane to ambulate, 375lb Body mass index is 50.63 kg/m².     Plan:  PT/OT  Encourage patient to pursue outpatient sleep study  Prediabetes  Lab Results   Component Value Date    HGBA1C 5.9 (H) 07/17/2024   Recent diagnosis of prediabetes, patient does not take any diabetes medications.  Was euglycemic PTA.  Will monitor off sliding scale  HTN (hypertension)  Blood Pressure: 140/71 will increase metoprolol XL/Toprol from 100 mg to 125 mg as above.    VTE Pharmacologic Prophylaxis:   High Risk (Score >/= 5) - Pharmacological DVT Prophylaxis Ordered: apixaban (Eliquis). Sequential Compression Devices Ordered.    Mobility:   Basic Mobility Inpatient Raw Score: 22  JH-HLM Goal: 7: Walk 25 feet or more  JH-HLM Achieved: 3: Sit at edge of bed  JH-HLM Goal NOT achieved. Continue with multidisciplinary rounding and encourage appropriate mobility to improve upon JH-HLM goals.    Patient Centered Rounds: I performed bedside rounds with nursing staff today.   Discussions with Specialists or Other Care Team Provider: Nursing    Education and Discussions with Family / Patient: updated mom on phone    Current Length of Stay: 2 day(s)  Current Patient Status: Inpatient   Certification Statement: The patient will continue to require additional inpatient hospital stay due to uncontrolled atrial fibrillation  Discharge Plan: Anticipate discharge in 24-48 hrs to home.    Code Status: Level 1 - Full Code    Subjective   Overnight: Was monitored off Cardizem drip.  Had few episodes of tachycardia, with highest 180s, given 1 PO Cardizem x1 over night. Restarted Cardizem gtt this early morning.      Complaints:  Feeling palpitation intermittently overnight.  However denies any chest pain, shortness of breath .     Patient denies Headache, Fever, Chills, Nausea, or Vomiting, Abdominal Pain, Diarrhea, Swellings, Dysuria, Urgency, frequency, or incontinence, Hematuria, new or worsening anxiety or depression    Diet: Diet Cardiovascular; Stress Test (1 Meal)   Bowel regimen:       DVT/VTE Prophylaxis: VTE covered by:  apixaban, Oral, 5 mg at 11/27/24 0711         Objective :  Temp:  [96.6 °F (35.9 °C)-97.9 °F (36.6 °C)] 97.9 °F (36.6 °C)  HR:  [] 72  BP: (104-151)/() 140/71  Resp:  [16-20] 18  SpO2:  [93 %-98 %] 95 %  O2 Device: None (Room air)    Body mass index is 50.63 kg/m².     Input and Output Summary (last 24 hours):     Intake/Output Summary (Last 24 hours) at 11/27/2024 1251  Last data filed at 11/27/2024 0807  Gross per 24 hour   Intake 660 ml   Output 1400 ml   Net -740 ml       Physical Exam  Constitutional:       Appearance: He is obese.   HENT:      Head: Normocephalic and atraumatic.      Mouth/Throat:      Mouth: Mucous membranes are moist.   Eyes:      Extraocular Movements: Extraocular movements intact.   Cardiovascular:      Rate and Rhythm: Tachycardia present. Rhythm irregular.      Heart sounds: No murmur heard.  Pulmonary:      Effort: Pulmonary effort is normal.      Breath sounds: Normal breath sounds.   Abdominal:      Palpations: Abdomen is soft.   Musculoskeletal:      Cervical back: Neck supple.   Skin:     General: Skin is warm and dry.      Capillary Refill: Capillary refill takes less than 2 seconds.   Neurological:      Mental Status: He is alert.           Lines/Drains:        Telemetry:  Telemetry Orders (From admission, onward)               24 Hour Telemetry Monitoring  Continuous x 24 Hours (Telem)        Question:  Reason for 24 Hour Telemetry  Answer:  Arrhythmias requiring acute medical intervention / PPM or ICD malfunction                     Telemetry  Reviewed: Atrial fibrillation. HR averaging 130s  Indication for Continued Telemetry Use: Arrthymias requiring medical therapy               Lab Results: I have reviewed the following results:   Results from last 7 days   Lab Units 11/27/24 0535 11/26/24 0450 11/25/24  1617   WBC Thousand/uL 10.93*   < > 11.23*   HEMOGLOBIN g/dL 15.9   < > 14.9   HEMATOCRIT % 49.8*   < > 46.9   PLATELETS Thousands/uL 242   < > 238   SEGS PCT %  --   --  88*   LYMPHO PCT %  --   --  8*   MONO PCT %  --   --  3*   EOS PCT %  --   --  0    < > = values in this interval not displayed.     Results from last 7 days   Lab Units 11/27/24 0535 11/26/24 0450 11/25/24  1617   SODIUM mmol/L 139   < > 139   POTASSIUM mmol/L 3.5   < > 4.6   CHLORIDE mmol/L 103   < > 103   CO2 mmol/L 29   < > 29   BUN mg/dL 16   < > 15   CREATININE mg/dL 0.71   < > 0.78   ANION GAP mmol/L 7   < > 7   CALCIUM mg/dL 8.8   < > 9.3   ALBUMIN g/dL  --   --  4.4   TOTAL BILIRUBIN mg/dL  --   --  0.39   ALK PHOS U/L  --   --  35   ALT U/L  --   --  18   AST U/L  --   --  28   GLUCOSE RANDOM mg/dL 90   < > 103    < > = values in this interval not displayed.     Results from last 7 days   Lab Units 11/25/24  1617   INR  1.13             Results from last 7 days   Lab Units 11/26/24  0450   PROCALCITONIN ng/ml <0.05       Recent Cultures (last 7 days):       Plan        Last 24 Hours Medication List:     Current Facility-Administered Medications:     acetaminophen (TYLENOL) tablet 650 mg, Q4H PRN    apixaban (ELIQUIS) tablet 5 mg, BID    benzonatate (TESSALON PERLES) capsule 200 mg, TID PRN    guaiFENesin (MUCINEX) 12 hr tablet 1,200 mg, BID PRN    metoprolol succinate (TOPROL-XL) 24 hr tablet 125 mg, Daily    pantoprazole (PROTONIX) EC tablet 40 mg, Early Morning    polyethylene glycol (MIRALAX) packet 17 g, Daily PRN    senna-docusate sodium (SENOKOT S) 8.6-50 mg per tablet 1 tablet,     Administrative Statements   Today, Patient Was Seen By: Sánchez Arndt,  MD      **Please Note: This note may have been constructed using a voice recognition system.**

## 2024-11-27 NOTE — ASSESSMENT & PLAN NOTE
Outpatient sleep study? Did not because of transportation issues. Patient uses cane to ambulate, 375lb Body mass index is 50.63 kg/m².     Plan:  PT/OT  Encourage patient to pursue outpatient sleep study

## 2024-11-27 NOTE — PROGRESS NOTES
Progress Note - Cardiology   Name: Maurilio Connors 44 y.o. male I MRN: [id number]  Unit/Bed#: S -01 I Date of Admission: 11/25/2024   Date of Service: 11/27/2024 I Hospital Day: 2     Assessment & Plan  Atrial fibrillation with rapid ventricular response (HCC)  Patient with history of PAF.  Diagnosed 3/2024.  Status post cardioversion at that time with restoration of sinus rhythm.  TTE at that time LV function normal.  No significant valvular heart disease.  Left and right atrial size normal.  At last follow-up visit with Dr. Brownlee  7/2024 patient with intermittent palpitations.  Declined an event recorder.  Presented with palpitations, ECG atrial fibrillation heart rate 120 bpm  Prior to admission he was on Toprol 100 mg daily  Negative troponins.   Admitting potassium 4.6 with a magnesium of 2.1.  Mild leukocytosis.  TSH 0.771.    Started on  IV Cardizem gtt after bolus .  Heart rate well-controlled on cardiology evaluation yesterday but could still feel his afib. Beta-blocker- on Toprol 125 mg daily ( increased from home dose)   Given IV Lasix 20 mg for mild pulmonary vascular congestion.  Diuresed 2500 cc  Today he was cardioverted to sinus rhythm. TTE normal LV function.  HTN (hypertension)  Fairly well-controlled  Morbid obesity (HCC)  BMI 51.13  Pulmonary vascular congestion  In the setting of atrial fibrillation with RVR  Admitting gave  IV lasix with good diuresis  Prediabetes    Nonspecific syndrome suggestive of viral illness  Mild leukocytosis.  Tachycardia.  Influenza A/B/RSV/COVID-negative  Procalcitonin less than 0.05    Recommendations  Continue Toprol 125mg daily. Eliquis 5mg every 12 hrs.  F/u with nuclear stress test  Strongly encouraged sleep study with treatment if indicated  Plan for discharge tomorrow if stress test normal.   Message sent to EP office for consultation.      Subjective/Objective   Chief Complaint/subjective  Cardioverted today. Now having stress test. Feels tired.  "        Vitals: /71   Pulse 72   Temp 97.9 °F (36.6 °C) (Temporal)   Resp 18   Ht 5' 11\" (1.803 m)   Wt (!) 165 kg (363 lb)   SpO2 95%   BMI 50.63 kg/m²     Vitals:    11/27/24 0538 11/27/24 0807   Weight: (!) 165 kg (363 lb 15.7 oz) (!) 165 kg (363 lb)     Orthostatic Blood Pressures      Flowsheet Row Most Recent Value   Blood Pressure 140/71 filed at 11/27/2024 1042   Patient Position - Orthostatic VS Sitting filed at 11/27/2024 0749              Intake/Output Summary (Last 24 hours) at 11/27/2024 1533  Last data filed at 11/27/2024 1301  Gross per 24 hour   Intake 540 ml   Output 1400 ml   Net -860 ml       Invasive Devices       Peripheral Intravenous Line  Duration             Peripheral IV 11/25/24 Left Antecubital 1 day              Drain  Duration             Ureteral Drain/Stent Left ureter 1048 days                      Current Facility-Administered Medications:     acetaminophen (TYLENOL) tablet 650 mg, Q4H PRN    apixaban (ELIQUIS) tablet 5 mg, BID    benzonatate (TESSALON PERLES) capsule 200 mg, TID PRN    guaiFENesin (MUCINEX) 12 hr tablet 1,200 mg, BID PRN    metoprolol succinate (TOPROL-XL) 24 hr tablet 125 mg, Daily    pantoprazole (PROTONIX) EC tablet 40 mg, Early Morning    polyethylene glycol (MIRALAX) packet 17 g, Daily PRN    senna-docusate sodium (SENOKOT S) 8.6-50 mg per tablet 1 tablet, HS      Physical Exam: /71   Pulse 72   Temp 97.9 °F (36.6 °C) (Temporal)   Resp 18   Ht 5' 11\" (1.803 m)   Wt (!) 165 kg (363 lb)   SpO2 95%   BMI 50.63 kg/m²     General Appearance:    Alert, cooperative, no distress, morbidly obese   Head:    Normocephalic, no scleral icterus                       Lungs:     Respirations unlabored   Chest Wall:    Rhythm regular                       Neurologic:   Alert and oriented to person place and time, no focal deficits                 Lab Results:   Recent Results (from the past 72 hours)   ECG 12 lead    Collection Time: 11/25/24  3:34 PM " "  Result Value Ref Range    Ventricular Rate 128 BPM    Atrial Rate 187 BPM    UT Interval  ms    QRSD Interval 90 ms    QT Interval 294 ms    QTC Interval 429 ms    P Axis  degrees    QRS Axis 72 degrees    T Wave Axis 12 degrees   CBC and differential    Collection Time: 11/25/24  4:17 PM   Result Value Ref Range    WBC 11.23 (H) 4.31 - 10.16 Thousand/uL    RBC 5.32 3.88 - 5.62 Million/uL    Hemoglobin 14.9 12.0 - 17.0 g/dL    Hematocrit 46.9 36.5 - 49.3 %    MCV 88 82 - 98 fL    MCH 28.0 26.8 - 34.3 pg    MCHC 31.8 31.4 - 37.4 g/dL    RDW 13.5 11.6 - 15.1 %    MPV 10.6 8.9 - 12.7 fL    Platelets 238 149 - 390 Thousands/uL    nRBC 0 /100 WBCs    Segmented % 88 (H) 43 - 75 %    Immature Grans % 1 0 - 2 %    Lymphocytes % 8 (L) 14 - 44 %    Monocytes % 3 (L) 4 - 12 %    Eosinophils Relative 0 0 - 6 %    Basophils Relative 0 0 - 1 %    Absolute Neutrophils 9.88 (H) 1.85 - 7.62 Thousands/µL    Absolute Immature Grans 0.06 0.00 - 0.20 Thousand/uL    Absolute Lymphocytes 0.94 0.60 - 4.47 Thousands/µL    Absolute Monocytes 0.32 0.17 - 1.22 Thousand/µL    Eosinophils Absolute 0.00 0.00 - 0.61 Thousand/µL    Basophils Absolute 0.03 0.00 - 0.10 Thousands/µL   Comprehensive metabolic panel    Collection Time: 11/25/24  4:17 PM   Result Value Ref Range    Sodium 139 135 - 147 mmol/L    Potassium 4.6 3.5 - 5.3 mmol/L    Chloride 103 96 - 108 mmol/L    CO2 29 21 - 32 mmol/L    ANION GAP 7 4 - 13 mmol/L    BUN 15 5 - 25 mg/dL    Creatinine 0.78 0.60 - 1.30 mg/dL    Glucose 103 65 - 140 mg/dL    Calcium 9.3 8.4 - 10.2 mg/dL    AST 28 13 - 39 U/L    ALT 18 7 - 52 U/L    Alkaline Phosphatase 35 34 - 104 U/L    Total Protein 8.0 6.4 - 8.4 g/dL    Albumin 4.4 3.5 - 5.0 g/dL    Total Bilirubin 0.39 0.20 - 1.00 mg/dL    eGFR 108 ml/min/1.73sq m   HS Troponin 0hr (reflex protocol)    Collection Time: 11/25/24  4:17 PM   Result Value Ref Range    hs TnI 0hr 5 \"Refer to ACS Flowchart\"- see link ng/L   APTT    Collection Time: 11/25/24  " "4:17 PM   Result Value Ref Range    PTT 28 23 - 34 seconds   Protime-INR    Collection Time: 11/25/24  4:17 PM   Result Value Ref Range    Protime 15.2 (H) 12.3 - 15.0 seconds    INR 1.13 0.85 - 1.19   TSH    Collection Time: 11/25/24  4:17 PM   Result Value Ref Range    TSH 3RD GENERATON 0.771 0.450 - 4.500 uIU/mL   B-Type Natriuretic Peptide(BNP)    Collection Time: 11/25/24  4:17 PM   Result Value Ref Range    BNP 86 0 - 100 pg/mL   ECG 12 lead    Collection Time: 11/25/24  5:17 PM   Result Value Ref Range    Ventricular Rate 97 BPM    Atrial Rate 78 BPM    CT Interval  ms    QRSD Interval 94 ms    QT Interval 320 ms    QTC Interval 406 ms    P Axis  degrees    QRS Axis 62 degrees    T Wave [place] 8 degrees   HS Troponin I 2hr    Collection Time: 11/25/24  6:48 PM   Result Value Ref Range    hs TnI 2hr 10 \"Refer to ACS Flowchart\"- see link ng/L    Delta 2hr hsTnI 5 <20 ng/L   Magnesium    Collection Time: 11/25/24  6:48 PM   Result Value Ref Range    Magnesium 2.1 1.9 - 2.7 mg/dL   COVID/FLU/RSV    Collection Time: 11/25/24  7:42 PM    Specimen: Nose; Nares   Result Value Ref Range    SARS-CoV-2 Negative Negative    INFLUENZA A PCR Negative Negative    INFLUENZA B PCR Negative Negative    RSV PCR Negative Negative   CBC    Collection Time: 11/26/24  4:50 AM   Result Value Ref Range    WBC 10.78 (H) 4.31 - 10.16 Thousand/uL    RBC 5.08 3.88 - 5.62 Million/uL    Hemoglobin 14.3 12.0 - 17.0 g/dL    Hematocrit 43.8 36.5 - 49.3 %    MCV 86 82 - 98 fL    MCH 28.1 26.8 - 34.3 pg    MCHC 32.6 31.4 - 37.4 g/dL    RDW 13.5 11.6 - 15.1 %    Platelets 208 149 - 390 Thousands/uL    MPV 9.9 8.9 - 12.7 fL   Basic metabolic panel    Collection Time: 11/26/24  4:50 AM   Result Value Ref Range    Sodium 141 135 - 147 mmol/L    Potassium 3.3 (L) 3.5 - 5.3 mmol/L    Chloride 103 96 - 108 mmol/L    CO2 31 21 - 32 mmol/L    ANION GAP 7 4 - 13 mmol/L    BUN 15 5 - 25 mg/dL    Creatinine 0.66 0.60 - 1.30 mg/dL    Glucose 79 65 - 140 mg/dL "    Calcium 8.8 8.4 - 10.2 mg/dL    eGFR 115 ml/min/1.73sq m   Procalcitonin    Collection Time: 11/26/24  4:50 AM   Result Value Ref Range    Procalcitonin <0.05 <=0.25 ng/ml   Basic metabolic panel    Collection Time: 11/27/24  5:35 AM   Result Value Ref Range    Sodium 139 135 - 147 mmol/L    Potassium 3.5 3.5 - 5.3 mmol/L    Chloride 103 96 - 108 mmol/L    CO2 29 21 - 32 mmol/L    ANION GAP 7 4 - 13 mmol/L    BUN 16 5 - 25 mg/dL    Creatinine 0.71 0.60 - 1.30 mg/dL    Glucose 90 65 - 140 mg/dL    Calcium 8.8 8.4 - 10.2 mg/dL    eGFR 112 ml/min/1.73sq m   CBC    Collection Time: 11/27/24  5:35 AM   Result Value Ref Range    WBC 10.93 (H) 4.31 - 10.16 Thousand/uL    RBC 5.73 (H) 3.88 - 5.62 Million/uL    Hemoglobin 15.9 12.0 - 17.0 g/dL    Hematocrit 49.8 (H) 36.5 - 49.3 %    MCV 87 82 - 98 fL    MCH 27.7 26.8 - 34.3 pg    MCHC 31.9 31.4 - 37.4 g/dL    RDW 13.8 11.6 - 15.1 %    Platelets 242 149 - 390 Thousands/uL    MPV 10.1 8.9 - 12.7 fL   Echo complete w/ contrast if indicated    Collection Time: 11/27/24  8:48 AM   Result Value Ref Range    BSA 2.72 m2    A4C EF 60 %    LV Diastolic Volume (BP) 161 mL    LV Systolic Volume (BP) 61 mL    EF 62 %    LVIDd 4.90 cm    LVIDS 3.00 cm    IVSd 1.20 cm    LVPWd 1.10 cm    FS 39 28 - 44    MV E' Tissue Velocity Septal 12 cm/s    MV E' Tissue Velocity Lateral 16 cm/s    LA Volume Index (BP) 30.9 mL/m2    RVID d 4.5 cm    Tricuspid annular plane systolic excursion 2.90 cm    LA size 4.7 cm    LA length (A2C) 5.60 cm    LA volume (BP) 84 mL    RAA A4C 23.1 cm2    TR Peak Mirza 2.3 m/s    Triscuspid Valve Regurgitation Peak Gradient 22.0 mmHg    Ao root 2.90 cm    Asc Ao 3.4 cm    Tricuspid valve peak regurgitation velocity 2.32 m/s    Left ventricular stroke volume (2D) 76.00 mL    IVS 1.2 cm    LEFT VENTRICLE SYSTOLIC VOLUME (MOD BIPLANE) 2D 35 mL    LV DIASTOLIC VOLUME (MOD BIPLANE) 2D 111 mL    Left Atrium Area-systolic Four Chamber 22.7 cm2    Left Atrium Area-systolic  Apical Two Chamber 23.2 cm2    LVSV, 2D 76 mL    LV Diastolic Volume Index (BP) 59.2 mL/m2    LV Systolic Volume Index (BP) 22.4 mL/m2    LV EF 55     Est. RA pres 10.0 mmHg    Right Ventricular Peak Systolic Pressure 32.00 mmHg   Stress strip    Collection Time: 11/27/24  1:17 PM   Result Value Ref Range    Protocol Name SHARRON SIT     Exercise duration (min) 3 min    Exercise duration (sec) 0 sec    Post Peak Systolic  mmHg    Max Diastolic Bp 100 mmHg    Peak  BPM    Max Predicted Heart Rate 174 BPM    Reason for Termination Protocol Complete     Test Indication Dyspnea     Target Hr Formular (220 - Age)*100%     Arrhy During Ex      ECG Interp Before Ex      ECG Interp during Ex      Ex Summary Comment      Chest Pain Statement none     Overall Hr Response To Exercise      Overall BP Response To Exercise     NM Myocardial Perfusion Spect (Pharmacological Induced Stress and/or Rest)    Collection Time: 11/27/24  1:44 PM   Result Value Ref Range    Baseline HR 80 bpm    Baseline /100 mmHg    O2 sat rest 98 %    Stress peak HR 93 bpm    Post peak  mmHg    O2 sat peak 98 %    Recovery HR 87 bpm    Recovery /100 mmHg    O2 sat recovery 98 %    Max  bpm    Max HR Percent 57 %    Estimated workload 1.0 METS    Rate Pressure Product 13,020.0     Angina Index 0      Imaging: I have personally reviewed pertinent reports.    Tele- nsr    Counseling / Coordination of Care  Total time spent today 30 minutes. Greater than 50% of total time was spent with the patient and / or family counseling and / or coordination of care.

## 2024-11-27 NOTE — ASSESSMENT & PLAN NOTE
Patient with history of single episode of symptomatic A-fib with RVR (March 2024) on Toprol 100 mg daily and Eliquis, re-presenting with A-fib with RVR.  Workup in March was overall unrevealing with no clear exacerbating factors including thyroid disease, underlying heart disease, infection or alcohol use.  Lab Results   Component Value Date    [id number] 0.771 11/25/2024      Lab Results   Component Value Date    HGBA1C 5.9 (H) 07/17/2024      POA  EKG A-fib with RVR 150s s/p Cardizem drip, 100s by my evaluation, HD stable  Chest x-ray prominence of pulmonary vasculature suggesting mild pulmonary vascular congestive change  [id number] 2  Likely 2/2 recent viral illness, [place], obesity hypoventilation syndrome, cardiac remodeling I/s/o persistent tachycardia versus medication induced    Plan:  Rate/Rhythm control: Metoprolol 125   AC: Continue Eliquis 5 mg twice daily  Monitoring on telemetry  Cardiology consult appreciated   11/27 - stress echo, cardioversion  Dispo  Needs sleep study upon discharge  Consider referral for EP for possible ablation

## 2024-11-27 NOTE — ASSESSMENT & PLAN NOTE
Patient with history of PAF.  Diagnosed 3/2024.  Status post cardioversion at that time with restoration of sinus rhythm.  TTE at that time LV function normal.  No significant valvular heart disease.  Left and right atrial size normal.  At last follow-up visit with Dr. Brownlee  7/2024 patient with intermittent palpitations.  Declined an event recorder.  Presented with palpitations, ECG atrial fibrillation heart rate 120 bpm  Prior to admission he was on Toprol 100 mg daily  Negative troponins.   Admitting potassium 4.6 with a magnesium of 2.1.  Mild leukocytosis.  TSH 0.771.    Started on  IV Cardizem gtt after bolus .  Heart rate well-controlled on cardiology evaluation yesterday but could still feel his afib. Beta-blocker- on Toprol 125 mg daily ( increased from home dose)   Given IV Lasix 20 mg for mild pulmonary vascular congestion.  Diuresed 2500 cc  Today he was cardioverted to sinus rhythm. TTE normal LV function.

## 2024-11-27 NOTE — ANESTHESIA POSTPROCEDURE EVALUATION
Post-Op Assessment Note    CV Status:  Stable  Pain Score: 0    Pain management: adequate       Mental Status:  Alert and awake   Hydration Status:  Euvolemic   PONV Controlled:  Controlled   Airway Patency:  Patent     Post Op Vitals Reviewed: Yes    No anethesia notable event occurred.    Staff: CRNA           Last Filed PACU Vitals:  Vitals Value Taken Time   Temp     Pulse 72    /71    Resp 18    SpO2 94        Modified Zarina:  No data recorded

## 2024-11-28 LAB
ANION GAP SERPL CALCULATED.3IONS-SCNC: 9 MMOL/L (ref 4–13)
BUN SERPL-MCNC: 16 MG/DL (ref 5–25)
CALCIUM SERPL-MCNC: 9 MG/DL (ref 8.4–10.2)
CHEST PAIN STATEMENT: NORMAL
CHLORIDE SERPL-SCNC: 103 MMOL/L (ref 96–108)
CO2 SERPL-SCNC: 27 MMOL/L (ref 21–32)
CREAT SERPL-MCNC: 0.78 MG/DL (ref 0.6–1.3)
ERYTHROCYTE [DISTWIDTH] IN BLOOD BY AUTOMATED COUNT: 13.9 % (ref 11.6–15.1)
GFR SERPL CREATININE-BSD FRML MDRD: 108 ML/MIN/1.73SQ M
GLUCOSE SERPL-MCNC: 88 MG/DL (ref 65–140)
HCT VFR BLD AUTO: 50.4 % (ref 36.5–49.3)
HGB BLD-MCNC: 16.3 G/DL (ref 12–17)
MAX DIASTOLIC BP: 100 MMHG
MAX PREDICTED HEART RATE: 174 BPM
MCH RBC QN AUTO: 28.2 PG (ref 26.8–34.3)
MCHC RBC AUTO-ENTMCNC: 32.3 G/DL (ref 31.4–37.4)
MCV RBC AUTO: 87 FL (ref 82–98)
PLATELET # BLD AUTO: 226 THOUSANDS/UL (ref 149–390)
PMV BLD AUTO: 9.8 FL (ref 8.9–12.7)
POTASSIUM SERPL-SCNC: 3.9 MMOL/L (ref 3.5–5.3)
PROTOCOL NAME: NORMAL
RBC # BLD AUTO: 5.77 MILLION/UL (ref 3.88–5.62)
REASON FOR TERMINATION: NORMAL
SODIUM SERPL-SCNC: 139 MMOL/L (ref 135–147)
STRESS POST EXERCISE DUR MIN: 3 MIN
STRESS POST EXERCISE DUR SEC: 0 SEC
STRESS POST PEAK HR: 100 BPM
STRESS POST PEAK SYSTOLIC BP: 142 MMHG
TARGET HR FORMULA: NORMAL
TEST INDICATION: NORMAL
WBC # BLD AUTO: 10.72 THOUSAND/UL (ref 4.31–10.16)

## 2024-11-28 PROCEDURE — 99232 SBSQ HOSP IP/OBS MODERATE 35: CPT | Performed by: INTERNAL MEDICINE

## 2024-11-28 PROCEDURE — 85027 COMPLETE CBC AUTOMATED: CPT

## 2024-11-28 PROCEDURE — 80048 BASIC METABOLIC PNL TOTAL CA: CPT

## 2024-11-28 RX ORDER — POTASSIUM CHLORIDE 1500 MG/1
20 TABLET, EXTENDED RELEASE ORAL ONCE
Status: COMPLETED | OUTPATIENT
Start: 2024-11-28 | End: 2024-11-28

## 2024-11-28 RX ADMIN — PANTOPRAZOLE SODIUM 40 MG: 40 TABLET, DELAYED RELEASE ORAL at 06:10

## 2024-11-28 RX ADMIN — APIXABAN 5 MG: 5 TABLET, FILM COATED ORAL at 17:22

## 2024-11-28 RX ADMIN — POTASSIUM CHLORIDE 20 MEQ: 1500 TABLET, EXTENDED RELEASE ORAL at 08:56

## 2024-11-28 RX ADMIN — APIXABAN 5 MG: 5 TABLET, FILM COATED ORAL at 08:57

## 2024-11-28 NOTE — PLAN OF CARE
Problem: CARDIOVASCULAR - ADULT  Goal: Maintains optimal cardiac output and hemodynamic stability  Description: INTERVENTIONS:  - Monitor I/O, vital signs and rhythm  - Monitor for S/S and trends of decreased cardiac output  - Administer and titrate ordered vasoactive medications to optimize hemodynamic stability  - Assess quality of pulses, skin color and temperature  - Assess for signs of decreased coronary artery perfusion  - Instruct patient to report change in severity of symptoms  Outcome: Progressing  Goal: Absence of cardiac dysrhythmias or at baseline rhythm  Description: INTERVENTIONS:  - Continuous cardiac monitoring, vital signs, obtain 12 lead EKG if ordered  - Administer antiarrhythmic and heart rate control medications as ordered  - Monitor electrolytes and administer replacement therapy as ordered  Outcome: Progressing     Problem: PAIN - ADULT  Goal: Verbalizes/displays adequate comfort level or baseline comfort level  Description: Interventions:  - Encourage patient to monitor pain and request assistance  - Assess pain using appropriate pain scale  - Administer analgesics based on type and severity of pain and evaluate response  - Implement non-pharmacological measures as appropriate and evaluate response  - Consider cultural and social influences on pain and pain management  - Notify physician/advanced practitioner if interventions unsuccessful or patient reports new pain  Outcome: Progressing     Problem: INFECTION - ADULT  Goal: Absence or prevention of progression during hospitalization  Description: INTERVENTIONS:  - Assess and monitor for signs and symptoms of infection  - Monitor lab/diagnostic results  - Monitor all insertion sites, i.e. indwelling lines, tubes, and drains  - Monitor endotracheal if appropriate and nasal secretions for changes in amount and color  - [place] appropriate cooling/warming therapies per order  - Administer medications as ordered  - Instruct and encourage  patient and family to use good hand hygiene technique  - Identify and instruct in appropriate isolation precautions for identified infection/condition  Outcome: Progressing  Goal: Absence of fever/infection during neutropenic period  Description: INTERVENTIONS:  - Monitor WBC    Outcome: Progressing     Problem: SAFETY ADULT  Goal: Patient will remain free of falls  Description: INTERVENTIONS:  - Educate patient/family on patient safety including physical limitations  - Instruct patient to call for assistance with activity   - Consult OT/PT to assist with strengthening/mobility   - Keep Call bell within reach  - Keep bed low and locked with side rails adjusted as appropriate  - Keep care items and personal belongings within reach  - Initiate and maintain comfort rounds  - Make Fall Risk Sign visible to staff  - Offer Toileting every 2 Hours, in advance of need  - Initiate/Maintain bed/chair alarm  - Obtain necessary fall risk management equipment  - Apply yellow socks and bracelet for high fall risk patients  - Consider moving patient to room near nurses station  Outcome: Progressing  Goal: Maintain or return to baseline ADL function  Description: INTERVENTIONS:  -  Assess patient's ability to carry out ADLs; assess patient's baseline for ADL function and identify physical deficits which impact ability to perform ADLs (bathing, care of mouth/teeth, toileting, grooming, dressing, etc.)  - Assess/evaluate cause of self-care deficits   - Assess range of motion  - Assess patient's mobility; develop plan if impaired  - Assess patient's need for assistive devices and provide as appropriate  - Encourage maximum independence but intervene and supervise when necessary  - Involve family in performance of ADLs  - Assess for home care needs following discharge   - Consider OT consult to assist with ADL evaluation and planning for discharge  - Provide patient education as appropriate  Outcome: Progressing  Goal:  Maintains/Returns to pre admission functional level  Description: INTERVENTIONS:  - Perform AM-PAC 6 Click Basic Mobility/ Daily Activity assessment daily.  - Set and communicate daily mobility goal to care team and patient/family/caregiver.   - Collaborate with rehabilitation services on mobility goals if consulted  -- Out of bed to chair 3 times a day   - Out of bed for meals 3 times a day  - Out of bed for toileting  - Record patient progress and toleration of activity level   Outcome: Progressing     Problem: DISCHARGE PLANNING  Goal: Discharge to home or other facility with appropriate resources  Description: INTERVENTIONS:  - Identify barriers to discharge w/patient and caregiver  - Arrange for needed discharge resources and transportation as appropriate  - Identify discharge learning needs (meds, wound care, etc.)  - Arrange for interpretive services to assist at discharge as needed  - Refer to Case Management Department for coordinating discharge planning if the patient needs post-hospital services based on physician/advanced practitioner order or complex needs related to functional status, cognitive ability, or social support system  Outcome: Progressing     Problem: Knowledge Deficit  Goal: Patient/family/caregiver demonstrates understanding of disease process, treatment plan, medications, and discharge instructions  Description: Complete learning assessment and assess knowledge base.  Interventions:  - Provide teaching at level of understanding  - Provide teaching via preferred learning methods  Outcome: Progressing

## 2024-11-28 NOTE — ASSESSMENT & PLAN NOTE
Mild leukocytosis.  Tachycardia.  Influenza A/B/RSV/COVID-negative  Procalcitonin less than 0.05

## 2024-11-28 NOTE — PLAN OF CARE
Problem: CARDIOVASCULAR - ADULT  Goal: Maintains optimal cardiac output and hemodynamic stability  Description: INTERVENTIONS:  - Monitor I/O, vital signs and rhythm  - Monitor for S/S and trends of decreased cardiac output  - Administer and titrate ordered vasoactive medications to optimize hemodynamic stability  - Assess quality of pulses, skin color and temperature  - Assess for signs of decreased coronary artery perfusion  - Instruct patient to report change in severity of symptoms  Outcome: Progressing  Goal: Absence of cardiac dysrhythmias or at baseline rhythm  Description: INTERVENTIONS:  - Continuous cardiac monitoring, vital signs, obtain 12 lead EKG if ordered  - Administer antiarrhythmic and heart rate control medications as ordered  - Monitor electrolytes and administer replacement therapy as ordered  Outcome: Progressing     Problem: PAIN - ADULT  Goal: Verbalizes/displays adequate comfort level or baseline comfort level  Description: Interventions:  - Encourage patient to monitor pain and request assistance  - Assess pain using appropriate pain scale  - Administer analgesics based on type and severity of pain and evaluate response  - Implement non-pharmacological measures as appropriate and evaluate response  - Consider cultural and social influences on pain and pain management  - Notify physician/advanced practitioner if interventions unsuccessful or patient reports new pain  Outcome: Progressing     Problem: INFECTION - ADULT  Goal: Absence or prevention of progression during hospitalization  Description: INTERVENTIONS:  - Assess and monitor for signs and symptoms of infection  - Monitor lab/diagnostic results  - Monitor all insertion sites, i.e. indwelling lines, tubes, and drains  - Monitor endotracheal if appropriate and nasal secretions for changes in amount and color  - [place] appropriate cooling/warming therapies per order  - Administer medications as ordered  - Instruct and encourage  patient and family to use good hand hygiene technique  - Identify and instruct in appropriate isolation precautions for identified infection/condition  Outcome: Progressing  Goal: Absence of fever/infection during neutropenic period  Description: INTERVENTIONS:  - Monitor WBC    Outcome: Progressing     Problem: SAFETY ADULT  Goal: Patient will remain free of falls  Description: INTERVENTIONS:  - Educate patient/family on patient safety including physical limitations  - Instruct patient to call for assistance with activity   - Consult OT/PT to assist with strengthening/mobility   - Keep Call bell within reach  - Keep bed low and locked with side rails adjusted as appropriate  - Keep care items and personal belongings within reach  - Initiate and maintain comfort rounds  - Make Fall Risk Sign visible to staff  - Offer Toileting every  Hours, in advance of need  - Initiate/Maintain alarm  - Obtain necessary fall risk management equipment:   - Apply yellow socks and bracelet for high fall risk patients  - Consider moving patient to room near nurses station  Outcome: Progressing  Goal: Maintain or return to baseline ADL function  Description: INTERVENTIONS:  -  Assess patient's ability to carry out ADLs; assess patient's baseline for ADL function and identify physical deficits which impact ability to perform ADLs (bathing, care of mouth/teeth, toileting, grooming, dressing, etc.)  - Assess/evaluate cause of self-care deficits   - Assess range of motion  - Assess patient's mobility; develop plan if impaired  - Assess patient's need for assistive devices and provide as appropriate  - Encourage maximum independence but intervene and supervise when necessary  - Involve family in performance of ADLs  - Assess for home care needs following discharge   - Consider OT consult to assist with ADL evaluation and planning for discharge  - Provide patient education as appropriate  Outcome: Progressing  Goal: Maintains/Returns to pre  admission functional level  Description: INTERVENTIONS:  - Perform AM-PAC 6 Click Basic Mobility/ Daily Activity assessment daily.  - Set and communicate daily mobility goal to care team and patient/family/caregiver.   - Collaborate with rehabilitation services on mobility goals if consulted  - Perform Range of Motion  times a day.  - Reposition patient every  hours.  - Dangle patient  times a day  - Stand patient  times a day  - Ambulate patient  times a day  - Out of bed to chair  times a day   - Out of bed for meals times a day  - Out of bed for toileting  - Record patient progress and toleration of activity level   Outcome: Progressing     Problem: DISCHARGE PLANNING  Goal: Discharge to home or other facility with appropriate resources  Description: INTERVENTIONS:  - Identify barriers to discharge w/patient and caregiver  - Arrange for needed discharge resources and transportation as appropriate  - Identify discharge learning needs (meds, wound care, etc.)  - Arrange for interpretive services to assist at discharge as needed  - Refer to Case Management Department for coordinating discharge planning if the patient needs post-hospital services based on physician/advanced practitioner order or complex needs related to functional status, cognitive ability, or social support system  Outcome: Progressing     Problem: Knowledge Deficit  Goal: Patient/family/caregiver demonstrates understanding of disease process, treatment plan, medications, and discharge instructions  Description: Complete learning assessment and assess knowledge base.  Interventions:  - Provide teaching at level of understanding  - Provide teaching via preferred learning methods  Outcome: Progressing

## 2024-11-28 NOTE — ASSESSMENT & PLAN NOTE
In the setting of atrial fibrillation with RVR  Admitting team gave  IV lasix with good diuresis  No further diuretic requirements

## 2024-11-28 NOTE — PROGRESS NOTES
Cardiology Progress Note - Maurilio Connors 44 y.o. male MRN: [id number]    Unit/Bed#: S -01 Encounter: [id number]        Assessment & Plan  Atrial fibrillation with rapid ventricular response (HCC)  Patient with history of PAF.  Diagnosed 3/2024.  Status post cardioversion at that time with restoration of sinus rhythm.  TTE at that time LV function normal.  No significant valvular heart disease.  Left and right atrial size normal.  At last follow-up visit with Dr. Brownlee  7/2024 patient with intermittent palpitations.  Declined an event recorder.  Presented with palpitations, ECG atrial fibrillation heart rate 120 bpm  Prior to admission he was on Toprol 100 mg daily  Negative troponins.   Admitting potassium 4.6 with a magnesium of 2.1.  Mild leukocytosis.  TSH 0.771.    Started on  IV Cardizem gtt after bolus .    Beta-blocker- on Toprol 125 mg daily ( increased from home dose)   Given IV Lasix 20 mg 1 dose for mild pulmonary vascular congestion.  Diuresed 2500 cc  Now cardioverted to sinus rhythm. TTE normal LV function.  Status post nuclear stress test revealing a small apical infarct with moderate clarisa-infarct ischemia  We will evaluate further for definitive assessment of coronaries with heart catheterization tomorrow.  Maintain n.p.o. status after midnight  HTN (hypertension)  Fairly well-controlled  Continue current regiment  Morbid obesity (HCC)  BMI 51.13  Pulmonary vascular congestion  In the setting of atrial fibrillation with RVR  Admitting team gave  IV lasix with good diuresis  No further diuretic requirements  Prediabetes  Management as per primary team  Nonspecific syndrome suggestive of viral illness  Mild leukocytosis.  Tachycardia.  Influenza A/B/RSV/COVID-negative  Procalcitonin less than 0.05      Subjective:   Patient seen and examined.  No significant events overnight.  ; pertinent negatives - chest pain, chest pressure/discomfort, irregular heart beat, and palpitations.    Objective:  "    Vitals: Blood pressure 104/74, pulse 73, temperature 97.9 °F (36.6 °C), resp. rate 18, height 5' 11\" (1.803 m), weight (!) 165 kg (362 lb 10.5 oz), SpO2 97%., Body mass index is 50.58 kg/m².,   Orthostatic Blood Pressures      Flowsheet Row Most Recent Value   Blood Pressure 104/74 filed at 11/28/2024 0756   Patient Position - Orthostatic VS Sitting filed at 11/27/2024 0749              Intake/Output Summary (Last 24 hours) at 11/28/2024 1154  Last data filed at 11/28/2024 0906  Gross per 24 hour   Intake 1080 ml   Output 620 ml   Net 460 ml       TELE: No significant arrhythmias seen.    Physical Exam:    GEN: Maurilio Connors appears well, alert and oriented x 3, pleasant and cooperative   HEENT: pupils equal, round, and reactive to light; extraocular muscles intact  NECK: supple, no carotid bruits   HEART: regular rhythm, normal S1 and S2, no murmurs, clicks, gallops or rubs   LUNGS: clear to auscultation bilaterally; no wheezes, rales, or rhonchi   ABDOMEN: normal bowel sounds, soft, no tenderness, no distention  EXTREMITIES: peripheral pulses normal; no clubbing, cyanosis, or edema  NEURO: no focal findings   SKIN: normal without suspicious lesions on exposed skin    Medications:      Current Facility-Administered Medications:     acetaminophen (TYLENOL) tablet 650 mg, 650 mg, Oral, Q4H PRN, Daniela Daly MD    apixaban (ELIQUIS) tablet 5 mg, 5 mg, Oral, BID, Daniela Daly MD, 5 mg at 11/28/24 0857    benzonatate (TESSALON PERLES) capsule 200 mg, 200 mg, Oral, TID PRN, Daniela Daly MD    guaiFENesin (MUCINEX) 12 hr tablet 1,200 mg, 1,200 mg, Oral, BID PRN, Daniela Daly MD    metoprolol succinate (TOPROL-XL) 24 hr tablet 125 mg, 125 mg, Oral, Daily, Daniela Daly MD, 125 mg at 11/27/24 0710    pantoprazole (PROTONIX) EC tablet 40 mg, 40 mg, Oral, Early Morning, Daniela Daly MD, 40 mg at 11/28/24 0610    polyethylene glycol (MIRALAX) packet 17 g, 17 g, Oral, Daily PRN, Daniela Daly MD    senna-docusate sodium (SENOKOT S) " 8.6-50 mg per tablet 1 tablet, 1 tablet, Oral, HS, Daniela Daly MD, 1 tablet at 11/25/24 2111     Labs & Results:        Results from last 7 days   Lab Units 11/28/24  0549 11/27/24  0535 11/26/24  0450   WBC Thousand/uL 10.72* 10.93* 10.78*   HEMOGLOBIN g/dL 16.3 15.9 14.3   HEMATOCRIT % 50.4* 49.8* 43.8   PLATELETS Thousands/uL 226 242 208         Results from last 7 days   Lab Units 11/28/24  0549 11/27/24  0535 11/26/24  0450 11/25/24  1617   POTASSIUM mmol/L 3.9 3.5 3.3* 4.6   CHLORIDE mmol/L 103 103 103 103   CO2 mmol/L 27 29 31 29   BUN mg/dL 16 16 15 15   CREATININE mg/dL 0.78 0.71 0.66 0.78   CALCIUM mg/dL 9.0 8.8 8.8 9.3   ALK PHOS U/L  --   --   --  35   ALT U/L  --   --   --  18   AST U/L  --   --   --  28     Results from last 7 days   Lab Units 11/25/24  1617   INR  1.13   PTT seconds 28     Results from last 7 days   Lab Units 11/25/24  1848   MAGNESIUM mg/dL 2.1       Echo: personally reviewed -ejection fraction 55%, biatrial enlargement    EKG personally reviewed by Slick Weaver MD.

## 2024-11-28 NOTE — ASSESSMENT & PLAN NOTE
Patient with history of single episode of symptomatic A-fib with RVR (March 2024) on Toprol 100 mg daily and Eliquis, re-presenting with A-fib with RVR.  Workup in March was overall unrevealing with no clear exacerbating factors including thyroid disease, underlying heart disease, infection or alcohol use.  Lab Results   Component Value Date    [id number] 0.771 11/25/2024      Lab Results   Component Value Date    HGBA1C 5.9 (H) 07/17/2024      Imaging:   Chest x-ray prominence of pulmonary vasculature suggesting mild pulmonary vascular congestive change  11/27 Myocardial perfusion study  Perfusion: There is a left ventricular perfusion defect that is small in size present in the apical location(s) that is partially reversible.   [id number]    Plan:  Rate/Rhythm control: Metoprolol 125   AC: Continue Eliquis 5 mg twice daily  Monitoring on telemetry  Planned heart cath 11/29   Dispo  Sleep study outpatient

## 2024-11-28 NOTE — ASSESSMENT & PLAN NOTE
Patient with history of PAF.  Diagnosed 3/2024.  Status post cardioversion at that time with restoration of sinus rhythm.  TTE at that time LV function normal.  No significant valvular heart disease.  Left and right atrial size normal.  At last follow-up visit with Dr. Brownlee  7/2024 patient with intermittent palpitations.  Declined an event recorder.  Presented with palpitations, ECG atrial fibrillation heart rate 120 bpm  Prior to admission he was on Toprol 100 mg daily  Negative troponins.   Admitting potassium 4.6 with a magnesium of 2.1.  Mild leukocytosis.  TSH 0.771.    Started on  IV Cardizem gtt after bolus .    Beta-blocker- on Toprol 125 mg daily ( increased from home dose)   Given IV Lasix 20 mg 1 dose for mild pulmonary vascular congestion.  Diuresed 2500 cc  Now cardioverted to sinus rhythm. TTE normal LV function.  Status post nuclear stress test revealing a small apical infarct with moderate clarisa-infarct ischemia  We will evaluate further for definitive assessment of coronaries with heart catheterization tomorrow.  Maintain n.p.o. status after midnight

## 2024-11-28 NOTE — PROGRESS NOTES
Progress Note - Hospitalist   Name: Maurilio Connors 44 y.o. male I MRN: [id number]  Unit/Bed#: S -01 I Date of Admission: 11/25/2024   Date of Service: 11/28/2024 I Hospital Day: 3    Assessment & Plan  Atrial fibrillation with rapid ventricular response (HCC)  Patient with history of single episode of symptomatic A-fib with RVR (March 2024) on Toprol 100 mg daily and Eliquis, re-presenting with A-fib with RVR.  Workup in March was overall unrevealing with no clear exacerbating factors including thyroid disease, underlying heart disease, infection or alcohol use.  Lab Results   Component Value Date    [id number] 0.771 11/25/2024      Lab Results   Component Value Date    HGBA1C 5.9 (H) 07/17/2024      Imaging:   Chest x-ray prominence of pulmonary vasculature suggesting mild pulmonary vascular congestive change  11/27 Myocardial perfusion study  Perfusion: There is a left ventricular perfusion defect that is small in size present in the apical location(s) that is partially reversible.   [id number] 2    Plan:  Rate/Rhythm control: Metoprolol 125   AC: Continue Eliquis 5 mg twice daily  Monitoring on telemetry  Planned heart cath 11/29   Dispo  Sleep study outpatient    Pulmonary vascular congestion  Chest x-ray reveals prominence of pulmonary vasculature suggesting mild pulmonary vascular congestive change.  Patient appears to be mildly volume overloaded with leg edema and crackles.  He is saturating 93% on room air, no complaints of dyspnea or chest pain at this time.      Findings may be 2/2 pulmonary vascular resistance I/s/o BRITTON/obesity hypoventilation syndrome vs left heart disease I/s/o of tachycardia induced cardiomyopathy.  S/p 20 mg IV Lasix    Plan:  Continue follow-up daily labs  Nonspecific syndrome suggestive of viral illness  5 day h/o sneezing, scratchy throat, cough and nasal congestion. Was prescribed promethazine syrup and prednisone 40 mg for 7 days at urgent care clinic, he is on day 3 of  prednisone.  Will treat supportively off prednisone.    Plan:  Tessalon Perles  Mucinex  Follow clinically  Morbid obesity (HCC)  Outpatient sleep study? Did not because of transportation issues. Patient uses cane to ambulate, 375lb Body mass index is 50.58 kg/m².     Plan:  PT/OT  Encourage patient to pursue outpatient sleep study  Prediabetes  Lab Results   Component Value Date    HGBA1C 5.9 (H) 07/17/2024   Recent diagnosis of prediabetes, patient does not take any diabetes medications.  Was euglycemic PTA.  Will monitor off sliding scale  HTN (hypertension)  Blood Pressure: 104/74 will increase metoprolol XL/Toprol from 100 mg to 125 mg as above.    VTE Pharmacologic Prophylaxis:   High Risk (Score >/= 5) - Pharmacological DVT Prophylaxis Ordered: apixaban (Eliquis). Sequential Compression Devices Ordered.    Mobility:   Basic Mobility Inpatient Raw Score: 23  JH-HLM Goal: 7: Walk 25 feet or more  JH-HLM Achieved: 7: Walk 25 feet or more  JH-HLM Goal achieved. Continue to encourage appropriate mobility.    Patient Centered Rounds: I performed bedside rounds with nursing staff today.   Discussions with Specialists or Other Care Team Provider: Nursing    Education and Discussions with Family / Patient: Plan to update the family regarding the patient's condition and care plan via phone or in person at a later time of today.     Current Length of Stay: 3 day(s)  Current Patient Status: Inpatient   Certification Statement: The patient will continue to require additional inpatient hospital stay due to heart cath 11/29  Discharge Plan: Anticipate discharge tomorrow to home.    Code Status: Level 1 - Full Code    Subjective   Overnight: No acute events over night     Complaints: No complaints.     Patient denies Headache, Fever, Chills, Chest Pain, Shortness of breath, Nausea, or Vomiting, Abdominal Pain, Diarrhea, Swellings, Dysuria, Urgency, frequency, or incontinence, Hematuria, new or worsening anxiety or  depression    Diet: Diet Cardiovascular; Cardiac   Bowel regimen:   Last BM Date: 11/27/24   DVT/VTE Prophylaxis: VTE covered by:  apixaban, Oral, 5 mg at 11/28/24 0857        Objective :  Temp:  [97.9 °F (36.6 °C)-98.9 °F (37.2 °C)] 97.9 °F (36.6 °C)  HR:  [73-74] 73  BP: (104-131)/(74) 104/74  SpO2:  [97 %] 97 %  O2 Device: None (Room air)    Body mass index is 50.58 kg/m².     Input and Output Summary (last 24 hours):     Intake/Output Summary (Last 24 hours) at 11/28/2024 1256  Last data filed at 11/28/2024 1100  Gross per 24 hour   Intake 1080 ml   Output 870 ml   Net 210 ml       Physical Exam  Constitutional:       Appearance: He is obese.   HENT:      Head: Normocephalic and atraumatic.      Mouth/Throat:      Mouth: Mucous membranes are moist.   Eyes:      Extraocular Movements: Extraocular movements intact.   Cardiovascular:      Rate and Rhythm: Normal rate and regular rhythm.      Heart sounds: No murmur heard.  Pulmonary:      Effort: Pulmonary effort is normal.      Breath sounds: Normal breath sounds.   Abdominal:      Palpations: Abdomen is soft.   Musculoskeletal:      Cervical back: Neck supple.   Skin:     General: Skin is warm and dry.      Capillary Refill: Capillary refill takes less than 2 seconds.   Neurological:      Mental Status: He is alert.           Lines/Drains:          Lab Results: I have reviewed the following results:   Results from last 7 days   Lab Units 11/28/24  0549 11/26/24  0450 11/25/24  1617   WBC Thousand/uL 10.72*   < > 11.23*   HEMOGLOBIN g/dL 16.3   < > 14.9   HEMATOCRIT % 50.4*   < > 46.9   PLATELETS Thousands/uL 226   < > 238   SEGS PCT %  --   --  88*   LYMPHO PCT %  --   --  8*   MONO PCT %  --   --  3*   EOS PCT %  --   --  0    < > = values in this interval not displayed.     Results from last 7 days   Lab Units 11/28/24  0549 11/26/24  0450 11/25/24  1617   SODIUM mmol/L 139   < > 139   POTASSIUM mmol/L 3.9   < > 4.6   CHLORIDE mmol/L 103   < > 103   CO2 mmol/L  27   < > 29   BUN mg/dL 16   < > 15   CREATININE mg/dL 0.78   < > 0.78   ANION GAP mmol/L 9   < > 7   CALCIUM mg/dL 9.0   < > 9.3   ALBUMIN g/dL  --   --  4.4   TOTAL BILIRUBIN mg/dL  --   --  0.39   ALK PHOS U/L  --   --  35   ALT U/L  --   --  18   AST U/L  --   --  28   GLUCOSE RANDOM mg/dL 88   < > 103    < > = values in this interval not displayed.     Results from last 7 days   Lab Units 11/25/24  1617   INR  1.13             Results from last 7 days   Lab Units 11/26/24  0450   PROCALCITONIN ng/ml <0.05       Recent Cultures (last 7 days):               Last 24 Hours Medication List:     Current Facility-Administered Medications:     acetaminophen (TYLENOL) tablet 650 mg, Q4H PRN    apixaban (ELIQUIS) tablet 5 mg, BID    benzonatate (TESSALON PERLES) capsule 200 mg, TID PRN    guaiFENesin (MUCINEX) 12 hr tablet 1,200 mg, BID PRN    metoprolol succinate (TOPROL-XL) 24 hr tablet 125 mg, Daily    pantoprazole (PROTONIX) EC tablet 40 mg, Early Morning    polyethylene glycol (MIRALAX) packet 17 g, Daily PRN    senna-docusate sodium (SENOKOT S) 8.6-50 mg per tablet 1 tablet, HS    Administrative Statements   Today, Patient Was Seen By: Sánchez Arndt MD      **Please Note: This note may have been constructed using a voice recognition system.**

## 2024-11-28 NOTE — QUICK NOTE
Family updated on patients current clinical status and management; all questions and concerns addressed.

## 2024-11-28 NOTE — ASSESSMENT & PLAN NOTE
Outpatient sleep study? Did not because of transportation issues. Patient uses cane to ambulate, 375lb Body mass index is 50.58 kg/m².     Plan:  PT/OT  Encourage patient to pursue outpatient sleep study

## 2024-11-29 LAB
ANION GAP SERPL CALCULATED.3IONS-SCNC: 6 MMOL/L (ref 4–13)
BUN SERPL-MCNC: 14 MG/DL (ref 5–25)
CALCIUM SERPL-MCNC: 9.5 MG/DL (ref 8.4–10.2)
CHLORIDE SERPL-SCNC: 99 MMOL/L (ref 96–108)
CO2 SERPL-SCNC: 32 MMOL/L (ref 21–32)
CREAT SERPL-MCNC: 0.82 MG/DL (ref 0.6–1.3)
ERYTHROCYTE [DISTWIDTH] IN BLOOD BY AUTOMATED COUNT: 13.6 % (ref 11.6–15.1)
GFR SERPL CREATININE-BSD FRML MDRD: 106 ML/MIN/1.73SQ M
GLUCOSE SERPL-MCNC: 86 MG/DL (ref 65–140)
HCT VFR BLD AUTO: 52.5 % (ref 36.5–49.3)
HGB BLD-MCNC: 16.7 G/DL (ref 12–17)
MCH RBC QN AUTO: 27.6 PG (ref 26.8–34.3)
MCHC RBC AUTO-ENTMCNC: 31.8 G/DL (ref 31.4–37.4)
MCV RBC AUTO: 87 FL (ref 82–98)
PLATELET # BLD AUTO: 244 THOUSANDS/UL (ref 149–390)
PMV BLD AUTO: 9.5 FL (ref 8.9–12.7)
POTASSIUM SERPL-SCNC: 3.6 MMOL/L (ref 3.5–5.3)
RBC # BLD AUTO: 6.06 MILLION/UL (ref 3.88–5.62)
SODIUM SERPL-SCNC: 137 MMOL/L (ref 135–147)
WBC # BLD AUTO: 11.03 THOUSAND/UL (ref 4.31–10.16)

## 2024-11-29 PROCEDURE — 99232 SBSQ HOSP IP/OBS MODERATE 35: CPT | Performed by: INTERNAL MEDICINE

## 2024-11-29 PROCEDURE — 99153 MOD SED SAME PHYS/QHP EA: CPT | Performed by: INTERNAL MEDICINE

## 2024-11-29 PROCEDURE — C1769 GUIDE WIRE: HCPCS | Performed by: INTERNAL MEDICINE

## 2024-11-29 PROCEDURE — 93458 L HRT ARTERY/VENTRICLE ANGIO: CPT | Performed by: INTERNAL MEDICINE

## 2024-11-29 PROCEDURE — 4A023N7 MEASUREMENT OF CARDIAC SAMPLING AND PRESSURE, LEFT HEART, PERCUTANEOUS APPROACH: ICD-10-PCS | Performed by: INTERNAL MEDICINE

## 2024-11-29 PROCEDURE — C1894 INTRO/SHEATH, NON-LASER: HCPCS | Performed by: INTERNAL MEDICINE

## 2024-11-29 PROCEDURE — 99152 MOD SED SAME PHYS/QHP 5/>YRS: CPT | Performed by: INTERNAL MEDICINE

## 2024-11-29 PROCEDURE — C1887 CATHETER, GUIDING: HCPCS | Performed by: INTERNAL MEDICINE

## 2024-11-29 PROCEDURE — B2111ZZ FLUOROSCOPY OF MULTIPLE CORONARY ARTERIES USING LOW OSMOLAR CONTRAST: ICD-10-PCS | Performed by: INTERNAL MEDICINE

## 2024-11-29 PROCEDURE — 80048 BASIC METABOLIC PNL TOTAL CA: CPT

## 2024-11-29 PROCEDURE — C1760 CLOSURE DEV, VASC: HCPCS | Performed by: INTERNAL MEDICINE

## 2024-11-29 PROCEDURE — 85027 COMPLETE CBC AUTOMATED: CPT

## 2024-11-29 DEVICE — ANGIO-SEAL VIP VASCULAR CLOSURE DEVICE
Type: IMPLANTABLE DEVICE | Status: FUNCTIONAL
Brand: ANGIO-SEAL

## 2024-11-29 RX ORDER — FENTANYL CITRATE 50 UG/ML
INJECTION, SOLUTION INTRAMUSCULAR; INTRAVENOUS CODE/TRAUMA/SEDATION MEDICATION
Status: DISCONTINUED | OUTPATIENT
Start: 2024-11-29 | End: 2024-11-29 | Stop reason: HOSPADM

## 2024-11-29 RX ORDER — VERAPAMIL HYDROCHLORIDE 2.5 MG/ML
INJECTION, SOLUTION INTRAVENOUS CODE/TRAUMA/SEDATION MEDICATION
Status: DISCONTINUED | OUTPATIENT
Start: 2024-11-29 | End: 2024-11-29 | Stop reason: HOSPADM

## 2024-11-29 RX ORDER — MIDAZOLAM HYDROCHLORIDE 2 MG/2ML
INJECTION, SOLUTION INTRAMUSCULAR; INTRAVENOUS CODE/TRAUMA/SEDATION MEDICATION
Status: DISCONTINUED | OUTPATIENT
Start: 2024-11-29 | End: 2024-11-29 | Stop reason: HOSPADM

## 2024-11-29 RX ORDER — NITROGLYCERIN 20 MG/100ML
INJECTION INTRAVENOUS CODE/TRAUMA/SEDATION MEDICATION
Status: DISCONTINUED | OUTPATIENT
Start: 2024-11-29 | End: 2024-11-29 | Stop reason: HOSPADM

## 2024-11-29 RX ORDER — SODIUM CHLORIDE 9 MG/ML
125 INJECTION, SOLUTION INTRAVENOUS CONTINUOUS
Status: DISPENSED | OUTPATIENT
Start: 2024-11-29 | End: 2024-11-29

## 2024-11-29 RX ORDER — LIDOCAINE HYDROCHLORIDE 10 MG/ML
INJECTION, SOLUTION EPIDURAL; INFILTRATION; INTRACAUDAL; PERINEURAL CODE/TRAUMA/SEDATION MEDICATION
Status: DISCONTINUED | OUTPATIENT
Start: 2024-11-29 | End: 2024-11-29 | Stop reason: HOSPADM

## 2024-11-29 RX ORDER — HEPARIN SODIUM 1000 [USP'U]/ML
INJECTION, SOLUTION INTRAVENOUS; SUBCUTANEOUS CODE/TRAUMA/SEDATION MEDICATION
Status: DISCONTINUED | OUTPATIENT
Start: 2024-11-29 | End: 2024-11-29 | Stop reason: HOSPADM

## 2024-11-29 RX ADMIN — SODIUM CHLORIDE 125 ML/HR: 0.9 INJECTION, SOLUTION INTRAVENOUS at 11:30

## 2024-11-29 RX ADMIN — ACETAMINOPHEN 650 MG: 325 TABLET, FILM COATED ORAL at 11:32

## 2024-11-29 RX ADMIN — ACETAMINOPHEN 650 MG: 325 TABLET, FILM COATED ORAL at 18:28

## 2024-11-29 RX ADMIN — APIXABAN 5 MG: 5 TABLET, FILM COATED ORAL at 18:28

## 2024-11-29 RX ADMIN — PANTOPRAZOLE SODIUM 40 MG: 40 TABLET, DELAYED RELEASE ORAL at 05:24

## 2024-11-29 RX ADMIN — METOPROLOL SUCCINATE 125 MG: 100 TABLET, EXTENDED RELEASE ORAL at 08:19

## 2024-11-29 NOTE — ASSESSMENT & PLAN NOTE
Mild leukocytosis.  Tachycardia.  Influenza A/B/RSV/COVID-negative  Procalcitonin less than 0.05    Recommendations  Evaluate later today for discharge.   Follow post cardiac cath instructions  Can resume Eliquis this evening  Continue Toprol 125 mg daily  Will refer for weight loss management  Outpatient sleep study ( pt prefers home study d/t transportation issues)

## 2024-11-29 NOTE — PROGRESS NOTES
Progress Note - Cardiology   Name: Maurilio Connors 44 y.o. male I MRN: [id number]  Unit/Bed#: S -01 I Date of Admission: 11/25/2024   Date of Service: 11/29/2024 I Hospital Day: 4     Assessment & Plan  Atrial fibrillation with rapid ventricular response (HCC)  Patient with history of PAF.  Diagnosed 3/2024.  Status post cardioversion at that time with restoration of sinus rhythm.  TTE at that time LV function normal.  No significant valvular heart disease.  Left and right atrial size normal.  At last follow-up visit with Dr. Brownlee  7/2024 patient with intermittent palpitations.  Declined an event recorder.  Presented with palpitations, this time also had chest discomfort with radiation to the left arm.    Recent URI symptoms.  ECG atrial fibrillation heart rate 120 bpm  Compliant with AC-Eliquis 5 mg every 12 hours  Prior to admission he was on Toprol 100 mg daily  Negative troponins.   Admitting potassium 4.6 with a magnesium of 2.1.  Mild leukocytosis.  TSH 0.771.    Started on  IV Cardizem gtt after bolus .  Beta-blocker- on Toprol 125 mg daily ( increased from home dose)   Given IV Lasix 20 mg 1 dose for mild pulmonary vascular congestion.  Diuresed 2500 cc  Cardioverted to sinus rhythm 11/27. TTE normal LV function.  Underwent nuclear stress test revealing a small apical infarct with moderate clarisa-infarct ischemia.  Underwent coronary angiogram today with no occlusive CAD.  HTN (hypertension)  Fairly well-controlled  Continue current regiment  Morbid obesity (HCC)  BMI 51.13  Pulmonary vascular congestion  In the setting of atrial fibrillation with RVR  Admitting team gave  IV lasix with good diuresis  Prediabetes  Management as per primary team  Nonspecific syndrome suggestive of viral illness  Mild leukocytosis.  Tachycardia.  Influenza A/B/RSV/COVID-negative  Procalcitonin less than 0.05    Recommendations  Evaluate later today for discharge.   Follow post cardiac cath instructions  Can resume  "Eliquis this evening  Continue Toprol 125 mg daily  Will refer for weight loss management  Outpatient sleep study ( pt prefers home study d/t transportation issues)    Subjective/Objective   Chief Complaint/subjective  Fleeting chest pain post cath.  None currently.  2 to 3-minute of visual change to right eye that he said he had during the time of his cath as well.  No chest pain or shortness of breath.  Complaint of his back pain        Vitals: BP (!) 118/49 (BP Location: Right arm)   Pulse 78   Temp 98.3 °F (36.8 °C)   Resp 18   Ht 5' 11\" (1.803 m)   Wt (!) 164 kg (362 lb 3.5 oz)   SpO2 97%   BMI 50.52 kg/m²     Vitals:    11/28/24 0500 11/29/24 0459   Weight: (!) 165 kg (362 lb 10.5 oz) (!) 164 kg (362 lb 3.5 oz)     Orthostatic Blood Pressures      Flowsheet Row Most Recent Value   Blood Pressure 118/49 filed at 11/29/2024 1130   Patient Position - Orthostatic VS Lying filed at 11/29/2024 1130              Intake/Output Summary (Last 24 hours) at 11/29/2024 1148  Last data filed at 11/29/2024 0900  Gross per 24 hour   Intake 1250 ml   Output 2300 ml   Net -1050 ml       Invasive Devices       Peripheral Intravenous Line  Duration             Peripheral IV 11/25/24 Left Antecubital 3 days    Peripheral IV 11/29/24 Right Antecubital <1 day              Drain  Duration             Ureteral Drain/Stent Left ureter 1050 days                      Current Facility-Administered Medications:     acetaminophen (TYLENOL) tablet 650 mg, Q4H PRN    apixaban (ELIQUIS) tablet 5 mg, BID    benzonatate (TESSALON PERLES) capsule 200 mg, TID PRN    guaiFENesin (MUCINEX) 12 hr tablet 1,200 mg, BID PRN    metoprolol succinate (TOPROL-XL) 24 hr tablet 125 mg, Daily    pantoprazole (PROTONIX) EC tablet 40 mg, Early Morning    polyethylene glycol (MIRALAX) packet 17 g, Daily PRN    senna-docusate sodium (SENOKOT S) 8.6-50 mg per tablet 1 tablet, HS    sodium chloride 0.9 % infusion, Continuous, Last Rate: 125 mL/hr (11/29/24 " "3630)      Physical Exam: BP (!) 118/49 (BP Location: Right arm)   Pulse 78   Temp 98.3 °F (36.8 °C)   Resp 18   Ht 5' 11\" (1.803 m)   Wt (!) 164 kg (362 lb 3.5 oz)   SpO2 97%   BMI 50.52 kg/m²     General Appearance:    Alert, cooperative, no distress, appears stated age   Head:    Normocephalic, no scleral icterus   Eyes:    PERRL   Nose:   Nares normal, septum midline, no drainage    Throat:   Lips, mucosa, and tongue normal           Lungs:     Clear to auscultation bilaterally, respirations unlabored        Heart:    Regular rate and rhythm, S1 and S2 normal, no murmur, rub   or gallop       Extremities:   Extremities normal, atraumatic, no cyanosis or edema       Skin:   Skin warm   Neurologic:   Alert and oriented to person place and time, no focal deficits                 Lab Results:   Recent Results (from the past 72 hours)   Basic metabolic panel    Collection Time: 11/27/24  5:35 AM   Result Value Ref Range    Sodium 139 135 - 147 mmol/L    Potassium 3.5 3.5 - 5.3 mmol/L    Chloride 103 96 - 108 mmol/L    CO2 29 21 - 32 mmol/L    ANION GAP 7 4 - 13 mmol/L    BUN 16 5 - 25 mg/dL    Creatinine 0.71 0.60 - 1.30 mg/dL    Glucose 90 65 - 140 mg/dL    Calcium 8.8 8.4 - 10.2 mg/dL    eGFR 112 ml/min/1.73sq m   CBC    Collection Time: 11/27/24  5:35 AM   Result Value Ref Range    WBC 10.93 (H) 4.31 - 10.16 Thousand/uL    RBC 5.73 (H) 3.88 - 5.62 Million/uL    Hemoglobin 15.9 12.0 - 17.0 g/dL    Hematocrit 49.8 (H) 36.5 - 49.3 %    MCV 87 82 - 98 fL    MCH 27.7 26.8 - 34.3 pg    MCHC 31.9 31.4 - 37.4 g/dL    RDW 13.8 11.6 - 15.1 %    Platelets 242 149 - 390 Thousands/uL    MPV 10.1 8.9 - 12.7 fL   Echo complete w/ contrast if indicated    Collection Time: 11/27/24  8:48 AM   Result Value Ref Range    BSA 2.72 m2    A4C EF 60 %    LV Diastolic Volume (BP) 161 mL    LV Systolic Volume (BP) 61 mL    EF 62 %    LVIDd 4.90 cm    LVIDS 3.00 cm    IVSd 1.20 cm    LVPWd 1.10 cm    FS 39 28 - 44    MV E' Tissue " Velocity Septal 12 cm/s    MV E' Tissue Velocity Lateral 16 cm/s    LA Volume Index (BP) 30.9 mL/m2    RVID d 4.5 cm    Tricuspid annular plane systolic excursion 2.90 cm    LA size 4.7 cm    LA length (A2C) 5.60 cm    LA volume (BP) 84 mL    RAA A4C 23.1 cm2    TR Peak Mirza 2.3 m/s    Triscuspid Valve Regurgitation Peak Gradient 22.0 mmHg    Ao root 2.90 cm    Asc Ao 3.4 cm    Tricuspid valve peak regurgitation velocity 2.32 m/s    Left ventricular stroke volume (2D) 76.00 mL    IVS 1.2 cm    LEFT VENTRICLE SYSTOLIC VOLUME (MOD BIPLANE) 2D 35 mL    LV DIASTOLIC VOLUME (MOD BIPLANE) 2D 111 mL    Left Atrium Area-systolic Four Chamber 22.7 cm2    Left Atrium Area-systolic Apical Two Chamber 23.2 cm2    LVSV, 2D 76 mL    LV Diastolic Volume Index (BP) 59.2 mL/m2    LV Systolic Volume Index (BP) 22.4 mL/m2    LV EF 55     Est. RA pres 10.0 mmHg    Right Ventricular Peak Systolic Pressure 32.00 mmHg   Stress strip    Collection Time: 11/27/24  1:17 PM   Result Value Ref Range    Protocol Name SHARRON SIT     Exercise duration (min) 3 min    Exercise duration (sec) 0 sec    Post Peak Systolic  mmHg    Max Diastolic Bp 100 mmHg    Peak  BPM    Max Predicted Heart Rate 174 BPM    Reason for Termination Protocol Complete     Test Indication Dyspnea     Target Hr Formular (220 - Age)*100%     Arrhy During Ex      ECG Interp Before Ex      ECG Interp during Ex      Ex Summary Comment      Chest Pain Statement none     Overall Hr Response To Exercise      Overall BP Response To Exercise     Stress strip    Collection Time: 11/27/24  1:17 PM   Result Value Ref Range    Protocol Name SHARRON SIT     Exercise duration (min) 3 min    Exercise duration (sec) 0 sec    Post Peak Systolic  mmHg    Max Diastolic Bp 100 mmHg    Peak  BPM    Max Predicted Heart Rate 174 BPM    Reason for Termination Protocol Complete     Test Indication Dyspnea     Target Hr Formular (220 - Age)*100%     Arrhy During Ex      ECG Interp  Before Ex      ECG Interp during Ex      Ex Summary Comment      Chest Pain Statement none     Overall Hr Response To Exercise      Overall BP Response To Exercise     NM Myocardial Perfusion Spect (Pharmacological Induced Stress and/or Rest)    Collection Time: 11/27/24  3:58 PM   Result Value Ref Range    Baseline HR 80 bpm    Baseline /100 mmHg    O2 sat rest 98 %    Stress peak HR 93 bpm    Post peak  mmHg    O2 sat peak 98 %    Recovery HR 87 bpm    Recovery /100 mmHg    O2 sat recovery 98 %    Max  bpm    Max HR Percent 57 %    Estimated workload 1.0 METS    Rate Pressure Product 13,020.0     Angina Index 0     Rest Nuclear Isotope Dose 15.90 mCi    Stress Nuclear Isotope Dose 49.20 mCi    Stress/rest perfusion ratio 1.16     EF (%) 53 %   Basic metabolic panel    Collection Time: 11/28/24  5:49 AM   Result Value Ref Range    Sodium 139 135 - 147 mmol/L    Potassium 3.9 3.5 - 5.3 mmol/L    Chloride 103 96 - 108 mmol/L    CO2 27 21 - 32 mmol/L    ANION GAP 9 4 - 13 mmol/L    BUN 16 5 - 25 mg/dL    Creatinine 0.78 0.60 - 1.30 mg/dL    Glucose 88 65 - 140 mg/dL    Calcium 9.0 8.4 - 10.2 mg/dL    eGFR 108 ml/min/1.73sq m   CBC    Collection Time: 11/28/24  5:49 AM   Result Value Ref Range    WBC 10.72 (H) 4.31 - 10.16 Thousand/uL    RBC 5.77 (H) 3.88 - 5.62 Million/uL    Hemoglobin 16.3 12.0 - 17.0 g/dL    Hematocrit 50.4 (H) 36.5 - 49.3 %    MCV 87 82 - 98 fL    MCH 28.2 26.8 - 34.3 pg    MCHC 32.3 31.4 - 37.4 g/dL    RDW 13.9 11.6 - 15.1 %    Platelets 226 149 - 390 Thousands/uL    MPV 9.8 8.9 - 12.7 fL   Basic metabolic panel    Collection Time: 11/29/24  5:20 AM   Result Value Ref Range    Sodium 137 135 - 147 mmol/L    Potassium 3.6 3.5 - 5.3 mmol/L    Chloride 99 96 - 108 mmol/L    CO2 32 21 - 32 mmol/L    ANION GAP 6 4 - 13 mmol/L    BUN 14 5 - 25 mg/dL    Creatinine 0.82 0.60 - 1.30 mg/dL    Glucose 86 65 - 140 mg/dL    Calcium 9.5 8.4 - 10.2 mg/dL    eGFR 106 ml/min/1.73sq m   CBC     Collection Time: 11/29/24  5:20 AM   Result Value Ref Range    WBC 11.03 (H) 4.31 - 10.16 Thousand/uL    RBC 6.06 (H) 3.88 - 5.62 Million/uL    Hemoglobin 16.7 12.0 - 17.0 g/dL    Hematocrit 52.5 (H) 36.5 - 49.3 %    MCV 87 82 - 98 fL    MCH 27.6 26.8 - 34.3 pg    MCHC 31.8 31.4 - 37.4 g/dL    RDW 13.6 11.6 - 15.1 %    Platelets 244 149 - 390 Thousands/uL    MPV 9.5 8.9 - 12.7 fL     Imaging: I have personally reviewed pertinent reports.    Tele- nsr      Counseling / Coordination of Care  Total time spent today 25 minutes. Greater than 50% of total time was spent with the patient and / or family counseling and / or coordination of care.

## 2024-11-29 NOTE — PLAN OF CARE
Problem: CARDIOVASCULAR - ADULT  Goal: Maintains optimal cardiac output and hemodynamic stability  Description: INTERVENTIONS:  - Monitor I/O, vital signs and rhythm  - Monitor for S/S and trends of decreased cardiac output  - Administer and titrate ordered vasoactive medications to optimize hemodynamic stability  - Assess quality of pulses, skin color and temperature  - Assess for signs of decreased coronary artery perfusion  - Instruct patient to report change in severity of symptoms  Outcome: Progressing  Goal: Absence of cardiac dysrhythmias or at baseline rhythm  Description: INTERVENTIONS:  - Continuous cardiac monitoring, vital signs, obtain 12 lead EKG if ordered  - Administer antiarrhythmic and heart rate control medications as ordered  - Monitor electrolytes and administer replacement therapy as ordered  Outcome: Progressing     Problem: PAIN - ADULT  Goal: Verbalizes/displays adequate comfort level or baseline comfort level  Description: Interventions:  - Encourage patient to monitor pain and request assistance  - Assess pain using appropriate pain scale  - Administer analgesics based on type and severity of pain and evaluate response  - Implement non-pharmacological measures as appropriate and evaluate response  - Consider cultural and social influences on pain and pain management  - Notify physician/advanced practitioner if interventions unsuccessful or patient reports new pain  Outcome: Progressing     Problem: INFECTION - ADULT  Goal: Absence or prevention of progression during hospitalization  Description: INTERVENTIONS:  - Assess and monitor for signs and symptoms of infection  - Monitor lab/diagnostic results  - Monitor all insertion sites, i.e. indwelling lines, tubes, and drains  - Monitor endotracheal if appropriate and nasal secretions for changes in amount and color  - [place] appropriate cooling/warming therapies per order  - Administer medications as ordered  - Instruct and encourage  patient and family to use good hand hygiene technique  - Identify and instruct in appropriate isolation precautions for identified infection/condition  Outcome: Progressing  Goal: Absence of fever/infection during neutropenic period  Description: INTERVENTIONS:  - Monitor WBC    Outcome: Progressing     Problem: SAFETY ADULT  Goal: Patient will remain free of falls  Description: INTERVENTIONS:  - Educate patient/family on patient safety including physical limitations  - Instruct patient to call for assistance with activity   - Consult OT/PT to assist with strengthening/mobility   - Keep Call bell within reach  - Keep bed low and locked with side rails adjusted as appropriate  - Keep care items and personal belongings within reach  - Initiate and maintain comfort rounds  - Make Fall Risk Sign visible to staff  - Offer Toileting every  Hours, in advance of need  - Initiate/Maintain alarm  - Obtain necessary fall risk management equipment:   - Apply yellow socks and bracelet for high fall risk patients  - Consider moving patient to room near nurses station  Outcome: Progressing  Goal: Maintain or return to baseline ADL function  Description: INTERVENTIONS:  -  Assess patient's ability to carry out ADLs; assess patient's baseline for ADL function and identify physical deficits which impact ability to perform ADLs (bathing, care of mouth/teeth, toileting, grooming, dressing, etc.)  - Assess/evaluate cause of self-care deficits   - Assess range of motion  - Assess patient's mobility; develop plan if impaired  - Assess patient's need for assistive devices and provide as appropriate  - Encourage maximum independence but intervene and supervise when necessary  - Involve family in performance of ADLs  - Assess for home care needs following discharge   - Consider OT consult to assist with ADL evaluation and planning for discharge  - Provide patient education as appropriate  Outcome: Progressing  Goal: Maintains/Returns to pre  admission functional level  Description: INTERVENTIONS:  - Perform AM-PAC 6 Click Basic Mobility/ Daily Activity assessment daily.  - Set and communicate daily mobility goal to care team and patient/family/caregiver.   - Collaborate with rehabilitation services on mobility goals if consulted  - Perform Range of Motion  times a day.  - Reposition patient every  hours.  - Dangle patient  times a day  - Stand patient  times a day  - Ambulate patient  times a day  - Out of bed to chair  times a day   - Out of bed for meals times a day  - Out of bed for toileting  - Record patient progress and toleration of activity level   Outcome: Progressing     Problem: DISCHARGE PLANNING  Goal: Discharge to home or other facility with appropriate resources  Description: INTERVENTIONS:  - Identify barriers to discharge w/patient and caregiver  - Arrange for needed discharge resources and transportation as appropriate  - Identify discharge learning needs (meds, wound care, etc.)  - Arrange for interpretive services to assist at discharge as needed  - Refer to Case Management Department for coordinating discharge planning if the patient needs post-hospital services based on physician/advanced practitioner order or complex needs related to functional status, cognitive ability, or social support system  Outcome: Progressing     Problem: Knowledge Deficit  Goal: Patient/family/caregiver demonstrates understanding of disease process, treatment plan, medications, and discharge instructions  Description: Complete learning assessment and assess knowledge base.  Interventions:  - Provide teaching at level of understanding  - Provide teaching via preferred learning methods  Outcome: Progressing

## 2024-11-29 NOTE — ANESTHESIA POSTPROCEDURE EVALUATION
Post-Op Assessment Note    Last Filed PACU Vitals:  Vitals Value Taken Time   Temp     Pulse 72 11/27/24 1042   /71 11/27/24 1042   Resp 18 11/27/24 1042   SpO2 95 % 11/27/24 1042       Modified Zarina:  Activity: 2 (11/29/2024 11:14 AM)  Respiration: 2 (11/29/2024 11:14 AM)  Circulation: 2 (11/29/2024 11:14 AM)  Consciousness: 2 (11/29/2024 11:14 AM)  Oxygen Saturation: 2 (11/29/2024 11:14 AM)  Modified Zarina Score: 10 (11/29/2024 11:14 AM)

## 2024-11-29 NOTE — PLAN OF CARE
Problem: CARDIOVASCULAR - ADULT  Goal: Maintains optimal cardiac output and hemodynamic stability  Description: INTERVENTIONS:  - Monitor I/O, vital signs and rhythm  - Monitor for S/S and trends of decreased cardiac output  - Administer and titrate ordered vasoactive medications to optimize hemodynamic stability  - Assess quality of pulses, skin color and temperature  - Assess for signs of decreased coronary artery perfusion  - Instruct patient to report change in severity of symptoms  Outcome: Progressing  Goal: Absence of cardiac dysrhythmias or at baseline rhythm  Description: INTERVENTIONS:  - Continuous cardiac monitoring, vital signs, obtain 12 lead EKG if ordered  - Administer antiarrhythmic and heart rate control medications as ordered  - Monitor electrolytes and administer replacement therapy as ordered  Outcome: Progressing     Problem: PAIN - ADULT  Goal: Verbalizes/displays adequate comfort level or baseline comfort level  Description: Interventions:  - Encourage patient to monitor pain and request assistance  - Assess pain using appropriate pain scale  - Administer analgesics based on type and severity of pain and evaluate response  - Implement non-pharmacological measures as appropriate and evaluate response  - Consider cultural and social influences on pain and pain management  - Notify physician/advanced practitioner if interventions unsuccessful or patient reports new pain  Outcome: Progressing

## 2024-11-29 NOTE — ASSESSMENT & PLAN NOTE
Patient with history of single episode of symptomatic A-fib with RVR (March 2024) on Toprol 100 mg daily and Eliquis, re-presenting with A-fib with RVR.  Workup in March was overall unrevealing with no clear exacerbating factors including thyroid disease, underlying heart disease, infection or alcohol use.  Lab Results   Component Value Date    [id number] 0.771 11/25/2024      Lab Results   Component Value Date    HGBA1C 5.9 (H) 07/17/2024      Imaging:   Chest x-ray prominence of pulmonary vasculature suggesting mild pulmonary vascular congestive change  11/27 Myocardial perfusion study  Perfusion: There is a left ventricular perfusion defect that is small in size present in the apical location(s) that is partially reversible.   [id number] 2    Plan:  Rate/Rhythm control: Metoprolol 125   AC: Continue Eliquis 5 mg twice daily  Monitoring on telemetry  heart cath 11/29   No ischemic changes no intervention was made  Dispo  Sleep study outpatient  Outpatient weight loss management

## 2024-11-29 NOTE — PROGRESS NOTES
Progress Note - Hospitalist   Name: Maurilio Connors 44 y.o. male I MRN: [id number]  Unit/Bed#: S -01 I Date of Admission: 11/25/2024   Date of Service: 11/29/2024 I Hospital Day: 4    Assessment & Plan  Atrial fibrillation with rapid ventricular response (HCC)  Patient with history of single episode of symptomatic A-fib with RVR (March 2024) on Toprol 100 mg daily and Eliquis, re-presenting with A-fib with RVR.  Workup in March was overall unrevealing with no clear exacerbating factors including thyroid disease, underlying heart disease, infection or alcohol use.  Lab Results   Component Value Date    [id number] 0.771 11/25/2024      Lab Results   Component Value Date    HGBA1C 5.9 (H) 07/17/2024      Imaging:   Chest x-ray prominence of pulmonary vasculature suggesting mild pulmonary vascular congestive change  11/27 Myocardial perfusion study  Perfusion: There is a left ventricular perfusion defect that is small in size present in the apical location(s) that is partially reversible.   [id number] 2    Plan:  Rate/Rhythm control: Metoprolol 125   AC: Continue Eliquis 5 mg twice daily  Monitoring on telemetry  heart cath 11/29   No ischemic changes no intervention was made  Dispo  Sleep study outpatient  Outpatient weight loss management    Pulmonary vascular congestion  Chest x-ray reveals prominence of pulmonary vasculature suggesting mild pulmonary vascular congestive change.  Patient appears to be mildly volume overloaded with leg edema and crackles.  He is saturating 93% on room air, no complaints of dyspnea or chest pain at this time.      Findings may be 2/2 pulmonary vascular resistance I/s/o BRITTON/obesity hypoventilation syndrome vs left heart disease I/s/o of tachycardia induced cardiomyopathy.  S/p 20 mg IV Lasix    Plan:  Continue follow-up daily labs  Nonspecific syndrome suggestive of viral illness  5 day h/o sneezing, scratchy throat, cough and nasal congestion. Was prescribed promethazine syrup  and prednisone 40 mg for 7 days at urgent care clinic, he is on day 3 of prednisone.  Will treat supportively off prednisone.    Plan:  Tessalon Perles  Mucinex  Follow clinically  Morbid obesity (HCC)  Outpatient sleep study? Did not because of transportation issues. Patient uses cane to ambulate, 375lb Body mass index is 50.52 kg/m².     Plan:  PT/OT  Encourage patient to pursue outpatient sleep study  Prediabetes  Lab Results   Component Value Date    HGBA1C 5.9 (H) 07/17/2024   Recent diagnosis of prediabetes, patient does not take any diabetes medications.  Was euglycemic PTA.  Will monitor off sliding scale  HTN (hypertension)  Blood Pressure: 128/78 will increase metoprolol XL/Toprol from 100 mg to 125 mg as above.    VTE Pharmacologic Prophylaxis:   High Risk (Score >/= 5) - Pharmacological DVT Prophylaxis Ordered: apixaban (Eliquis). Sequential Compression Devices Ordered.    Mobility:   Basic Mobility Inpatient Raw Score: 23  JH-HLM Goal: 7: Walk 25 feet or more  JH-HLM Achieved: 6: Walk 10 steps or more  JH-HLM Goal NOT achieved. Continue with multidisciplinary rounding and encourage appropriate mobility to improve upon JH-HLM goals.    Patient Centered Rounds: I performed bedside rounds with nursing staff today.   Discussions with Specialists or Other Care Team Provider: Nursing    Education and Discussions with Family / Patient: Updated  (mother) via phone.    Current Length of Stay: 4 day(s)  Current Patient Status: Inpatient   Certification Statement:  Likely going home  Discharge Plan:  D/C today pending final cardiology recommendation    Code Status: Level 1 - Full Code    Subjective   Overnight: No acute events over night     Complaints: No complaints.     Patient denies Headache, Fever, Chills, Chest Pain, Shortness of breath, Nausea, or Vomiting, Abdominal Pain, Diarrhea, Swellings, Dysuria, Urgency, frequency, or incontinence, Hematuria, new or worsening anxiety or  depression    Diet: Diet NPO; Sips with meds  Diet NPO; Sips of clear liquids   Bowel regimen:   Last BM Date: 11/27/24   DVT/VTE Prophylaxis: VTE covered by:  apixaban, Oral, 5 mg at 11/28/24 1722        Objective :  Temp:  [97.2 °F (36.2 °C)-98.3 °F (36.8 °C)] 98.3 °F (36.8 °C)  HR:  [70-76] 76  BP: (104-128)/(68-78) 128/78  Resp:  [16] 16  SpO2:  [93 %-97 %] 97 %  O2 Device: None (Room air)    Body mass index is 50.52 kg/m².     Input and Output Summary (last 24 hours):     Intake/Output Summary (Last 24 hours) at 11/29/2024 0750  Last data filed at 11/29/2024 0707  Gross per 24 hour   Intake 1490 ml   Output 2350 ml   Net -860 ml       Physical Exam  Constitutional:       Appearance: He is obese. He is not toxic-appearing.   HENT:      Head: Normocephalic and atraumatic.      Mouth/Throat:      Mouth: Mucous membranes are moist.   Eyes:      Extraocular Movements: Extraocular movements intact.   Cardiovascular:      Rate and Rhythm: Normal rate and regular rhythm.      Heart sounds: No murmur heard.  Pulmonary:      Effort: Pulmonary effort is normal.      Breath sounds: Normal breath sounds.   Abdominal:      Palpations: Abdomen is soft.   Musculoskeletal:      Cervical back: Neck supple.   Skin:     General: Skin is warm and dry.      Capillary Refill: Capillary refill takes less than 2 seconds.   Neurological:      Mental Status: He is alert.         Lines/Drains:        Telemetry:  Telemetry Orders (From admission, onward)               24 Hour Telemetry Monitoring  Continuous x 24 Hours (Telem)        Question:  Reason for 24 Hour Telemetry  Answer:  Arrhythmias requiring acute medical intervention / PPM or ICD malfunction                     Telemetry Reviewed: Normal Sinus Rhythm  Indication for Continued Telemetry Use: Arrthymias requiring medical therapy               Lab Results: I have reviewed the following results:   Results from last 7 days   Lab Units 11/29/24  0520 11/26/24  0450 11/25/24  5907    WBC Thousand/uL 11.03*   < > 11.23*   HEMOGLOBIN g/dL 16.7   < > 14.9   HEMATOCRIT % 52.5*   < > 46.9   PLATELETS Thousands/uL 244   < > 238   SEGS PCT %  --   --  88*   LYMPHO PCT %  --   --  8*   MONO PCT %  --   --  3*   EOS PCT %  --   --  0    < > = values in this interval not displayed.     Results from last 7 days   Lab Units 11/29/24  0520 11/26/24  0450 11/25/24  1617   SODIUM mmol/L 137   < > 139   POTASSIUM mmol/L 3.6   < > 4.6   CHLORIDE mmol/L 99   < > 103   CO2 mmol/L 32   < > 29   BUN mg/dL 14   < > 15   CREATININE mg/dL 0.82   < > 0.78   ANION GAP mmol/L 6   < > 7   CALCIUM mg/dL 9.5   < > 9.3   ALBUMIN g/dL  --   --  4.4   TOTAL BILIRUBIN mg/dL  --   --  0.39   ALK PHOS U/L  --   --  35   ALT U/L  --   --  18   AST U/L  --   --  28   GLUCOSE RANDOM mg/dL 86   < > 103    < > = values in this interval not displayed.     Results from last 7 days   Lab Units 11/25/24  1617   INR  1.13             Results from last 7 days   Lab Units 11/26/24  0450   PROCALCITONIN ng/ml <0.05       Recent Cultures (last 7 days):               Last 24 Hours Medication List:     Current Facility-Administered Medications:     acetaminophen (TYLENOL) tablet 650 mg, Q4H PRN    apixaban (ELIQUIS) tablet 5 mg, BID    benzonatate (TESSALON PERLES) capsule 200 mg, TID PRN    guaiFENesin (MUCINEX) 12 hr tablet 1,200 mg, BID PRN    metoprolol succinate (TOPROL-XL) 24 hr tablet 125 mg, Daily    pantoprazole (PROTONIX) EC tablet 40 mg, Early Morning    polyethylene glycol (MIRALAX) packet 17 g, Daily PRN    senna-docusate sodium (SENOKOT S) 8.6-50 mg per tablet 1 tablet, HS    Administrative Statements   Today, Patient Was Seen By: Sánchez Arndt MD      **Please Note: This note may have been constructed using a voice recognition system.**

## 2024-11-29 NOTE — ASSESSMENT & PLAN NOTE
Patient with history of PAF.  Diagnosed 3/2024.  Status post cardioversion at that time with restoration of sinus rhythm.  TTE at that time LV function normal.  No significant valvular heart disease.  Left and right atrial size normal.  At last follow-up visit with Dr. Brownlee  7/2024 patient with intermittent palpitations.  Declined an event recorder.  Presented with palpitations, this time also had chest discomfort with radiation to the left arm.    Recent URI symptoms.  ECG atrial fibrillation heart rate 120 bpm  Compliant with AC-Eliquis 5 mg every 12 hours  Prior to admission he was on Toprol 100 mg daily  Negative troponins.   Admitting potassium 4.6 with a magnesium of 2.1.  Mild leukocytosis.  TSH 0.771.    Started on  IV Cardizem gtt after bolus .  Beta-blocker- on Toprol 125 mg daily ( increased from home dose)   Given IV Lasix 20 mg 1 dose for mild pulmonary vascular congestion.  Diuresed 2500 cc  Cardioverted to sinus rhythm 11/27. TTE normal LV function.  Underwent nuclear stress test revealing a small apical infarct with moderate clarisa-infarct ischemia.  Underwent coronary angiogram today with no occlusive CAD.

## 2024-11-29 NOTE — ASSESSMENT & PLAN NOTE
Outpatient sleep study? Did not because of transportation issues. Patient uses cane to ambulate, 375lb Body mass index is 50.52 kg/m².     Plan:  PT/OT  Encourage patient to pursue outpatient sleep study

## 2024-11-29 NOTE — ANESTHESIA PREPROCEDURE EVALUATION
Procedure:  CARDIOVERSION    Relevant Problems   ANESTHESIA   (-) History of anesthesia complications      CARDIO   (+) Atrial fibrillation with rapid ventricular response (HCC)   (+) HTN (hypertension)      GI/HEPATIC   (+) Gastroesophageal reflux disease      /RENAL   (+) ADILSON (acute kidney injury) (HCC)   (+) Renal calculus      NEURO/PSYCH   (+) Anxiety        Physical Exam    Airway    Mallampati score: II  TM Distance: >3 FB  Neck ROM: full     Dental       Cardiovascular      Pulmonary      Other Findings        Anesthesia Plan  ASA Score- 3     Anesthesia Type- IV sedation with anesthesia with ASA Monitors.         Additional Monitors:     Airway Plan:            Plan Factors-    Chart reviewed. EKG reviewed.  Existing labs reviewed. Patient summary reviewed.                  Induction- intravenous.    Postoperative Plan-     Perioperative Resuscitation Plan - Level 1 - Full Code.       Informed Consent- Anesthetic plan and risks discussed with patient.  I personally reviewed this patient with the CRNA. Discussed and agreed on the Anesthesia Plan with the CRNA..

## 2024-11-30 VITALS
HEIGHT: 71 IN | SYSTOLIC BLOOD PRESSURE: 136 MMHG | TEMPERATURE: 98 F | WEIGHT: 315 LBS | BODY MASS INDEX: 44.1 KG/M2 | HEART RATE: 67 BPM | DIASTOLIC BLOOD PRESSURE: 71 MMHG | OXYGEN SATURATION: 97 % | RESPIRATION RATE: 18 BRPM

## 2024-11-30 LAB
ANION GAP SERPL CALCULATED.3IONS-SCNC: 7 MMOL/L (ref 4–13)
BUN SERPL-MCNC: 14 MG/DL (ref 5–25)
CALCIUM SERPL-MCNC: 9.2 MG/DL (ref 8.4–10.2)
CHLORIDE SERPL-SCNC: 100 MMOL/L (ref 96–108)
CO2 SERPL-SCNC: 30 MMOL/L (ref 21–32)
CREAT SERPL-MCNC: 0.82 MG/DL (ref 0.6–1.3)
GFR SERPL CREATININE-BSD FRML MDRD: 106 ML/MIN/1.73SQ M
GLUCOSE SERPL-MCNC: 91 MG/DL (ref 65–140)
MAGNESIUM SERPL-MCNC: 2 MG/DL (ref 1.9–2.7)
PHOSPHATE SERPL-MCNC: 2.9 MG/DL (ref 2.7–4.5)
POTASSIUM SERPL-SCNC: 4 MMOL/L (ref 3.5–5.3)
SODIUM SERPL-SCNC: 137 MMOL/L (ref 135–147)

## 2024-11-30 PROCEDURE — 99239 HOSP IP/OBS DSCHRG MGMT >30: CPT | Performed by: INTERNAL MEDICINE

## 2024-11-30 PROCEDURE — 84100 ASSAY OF PHOSPHORUS: CPT

## 2024-11-30 PROCEDURE — 83735 ASSAY OF MAGNESIUM: CPT

## 2024-11-30 PROCEDURE — 99232 SBSQ HOSP IP/OBS MODERATE 35: CPT | Performed by: INTERNAL MEDICINE

## 2024-11-30 PROCEDURE — 80048 BASIC METABOLIC PNL TOTAL CA: CPT

## 2024-11-30 RX ORDER — METOPROLOL SUCCINATE 25 MG/1
125 TABLET, EXTENDED RELEASE ORAL DAILY
Qty: 150 TABLET | Refills: 0 | Status: SHIPPED | OUTPATIENT
Start: 2024-11-30

## 2024-11-30 RX ADMIN — PANTOPRAZOLE SODIUM 40 MG: 40 TABLET, DELAYED RELEASE ORAL at 05:23

## 2024-11-30 RX ADMIN — APIXABAN 5 MG: 5 TABLET, FILM COATED ORAL at 08:35

## 2024-11-30 RX ADMIN — METOPROLOL SUCCINATE 125 MG: 100 TABLET, EXTENDED RELEASE ORAL at 08:35

## 2024-11-30 NOTE — ASSESSMENT & PLAN NOTE
Chest x-ray reveals prominence of pulmonary vasculature suggesting mild pulmonary vascular congestive change.  Patient appears to be mildly volume overloaded with leg edema and crackles.  He is saturating 93% on room air, no complaints of dyspnea or chest pain at this time.      Findings may be 2/2 pulmonary vascular resistance I/s/o RBITTON/obesity hypoventilation syndrome vs left heart disease I/s/o of tachycardia induced cardiomyopathy.  S/p 20 mg IV Lasix    Plan:  Please follow-up with your PCP

## 2024-11-30 NOTE — PROGRESS NOTES
Cardiology Progress Note - Maurilio Connors 44 y.o. male MRN: [id number]    Unit/Bed#: S -01 Encounter: [id number]        Assessment & Plan  Atrial fibrillation with rapid ventricular response (HCC)  Patient with history of PAF.  Diagnosed 3/2024.  Status post cardioversion at that time with restoration of sinus rhythm.  TTE at that time LV function normal.  No significant valvular heart disease.  Left and right atrial size normal.  At last follow-up visit with Dr. Brownlee  7/2024 patient with intermittent palpitations.  Declined an event recorder.  Presented with palpitations, this time also had chest discomfort with radiation to the left arm.    Recent URI symptoms.  ECG atrial fibrillation heart rate 120 bpm  Compliant with AC-Eliquis 5 mg every 12 hours  Prior to admission he was on Toprol 100 mg daily  Negative troponins.   Admitting potassium 4.6 with a magnesium of 2.1.  Mild leukocytosis.  TSH 0.771.    Started on  IV Cardizem gtt after bolus initially.  Beta-blocker- on Toprol 125 mg daily ( increased from home dose)   Given IV Lasix 20 mg 1 dose for mild pulmonary vascular congestion.  Diuresed 2500 cc  Cardioverted to sinus rhythm 11/27. TTE normal LV function.  Underwent nuclear stress test revealing a small apical infarct with moderate clarisa-infarct ischemia.  Underwent coronary angiogram today with no occlusive CAD.  Patient will require outpatient sleep study  Referral to weight management  Stable currently for discharge with outpatient follow-up from cardiac standpoint  HTN (hypertension)  Fairly well-controlled  Continue current regiment  Morbid obesity (HCC)  BMI 51.13  Pulmonary vascular congestion  In the setting of atrial fibrillation with RVR  Admitting team gave  IV lasix with good diuresis  Prediabetes  Management as per primary team  Nonspecific syndrome suggestive of viral illness  Mild leukocytosis.  Tachycardia.  Influenza A/B/RSV/COVID-negative  Procalcitonin less than  "0.05    Subjective:   Patient seen and examined.  No significant events overnight.  ; pertinent negatives - chest pain, chest pressure/discomfort, dyspnea, irregular heart beat, lower extremity edema, and palpitations.    Objective:     Vitals: Blood pressure 136/71, pulse 67, temperature 98 °F (36.7 °C), temperature source Oral, resp. rate 18, height 5' 11\" (1.803 m), weight (!) 163 kg (360 lb 3.7 oz), SpO2 97%., Body mass index is 50.24 kg/m².,   Orthostatic Blood Pressures      Flowsheet Row Most Recent Value   Blood Pressure 136/71 filed at 11/30/2024 0756   Patient Position - Orthostatic VS Sitting filed at 11/30/2024 0756              Intake/Output Summary (Last 24 hours) at 11/30/2024 1055  Last data filed at 11/30/2024 0927  Gross per 24 hour   Intake 1860 ml   Output 2025 ml   Net -165 ml     Telemetry: Sinus rhythm overnight    Physical Exam:    GEN: Maurilio Connors appears well, alert and oriented x 3, pleasant and cooperative   HEENT: pupils equal, round, and reactive to light; extraocular muscles intact  NECK: supple, no carotid bruits   HEART: regular rhythm, normal S1 and S2, no murmurs, clicks, gallops or rubs   LUNGS: clear to auscultation bilaterally; no wheezes, rales, or rhonchi   ABDOMEN: normal bowel sounds, soft, no tenderness, no distention  EXTREMITIES: peripheral pulses normal; no clubbing, cyanosis, or edema  NEURO: no focal findings   SKIN: normal without suspicious lesions on exposed skin    Medications:      Current Facility-Administered Medications:     acetaminophen (TYLENOL) tablet 650 mg, 650 mg, Oral, Q4H PRN, Marilynn Rey MD, 650 mg at 11/29/24 1828    apixaban (ELIQUIS) tablet 5 mg, 5 mg, Oral, BID, Marilynn Rey MD, 5 mg at 11/30/24 0835    benzonatate (TESSALON PERLES) capsule 200 mg, 200 mg, Oral, TID PRN, Marilynn Rey MD    guaiFENesin (MUCINEX) 12 hr tablet 1,200 mg, 1,200 mg, Oral, BID PRN, Marilynn Rey MD    metoprolol succinate (TOPROL-XL) 24 hr tablet " 125 mg, 125 mg, Oral, Daily, Marilynn Rey MD, 125 mg at 11/30/24 0835    pantoprazole (PROTONIX) EC tablet 40 mg, 40 mg, Oral, Early Morning, Marilynn Rey MD, 40 mg at 11/30/24 0523    polyethylene glycol (MIRALAX) packet 17 g, 17 g, Oral, Daily PRN, Marilynn Rey MD    senna-docusate sodium (SENOKOT S) 8.6-50 mg per tablet 1 tablet, 1 tablet, Oral, HS, Marilynn Rey MD, 1 tablet at 11/25/24 2111     Labs & Results:        Results from last 7 days   Lab Units 11/29/24  0520 11/28/24  0549 11/27/24  0535   WBC Thousand/uL 11.03* 10.72* 10.93*   HEMOGLOBIN g/dL 16.7 16.3 15.9   HEMATOCRIT % 52.5* 50.4* 49.8*   PLATELETS Thousands/uL 244 226 242         Results from last 7 days   Lab Units 11/30/24  0610 11/29/24  0520 11/28/24  0549 11/26/24  0450 11/25/24  1617   POTASSIUM mmol/L 4.0 3.6 3.9   < > 4.6   CHLORIDE mmol/L 100 99 103   < > 103   CO2 mmol/L 30 32 27   < > 29   BUN mg/dL 14 14 16   < > 15   CREATININE mg/dL 0.82 0.82 0.78   < > 0.78   CALCIUM mg/dL 9.2 9.5 9.0   < > 9.3   ALK PHOS U/L  --   --   --   --  35   ALT U/L  --   --   --   --  18   AST U/L  --   --   --   --  28    < > = values in this interval not displayed.     Results from last 7 days   Lab Units 11/25/24  1617   INR  1.13   PTT seconds 28     Results from last 7 days   Lab Units 11/30/24  0610 11/25/24  1848   MAGNESIUM mg/dL 2.0 2.1       Echo: personally reviewed -normal LVEF, biatrial enlargement    EKG personally reviewed by Slick Weaver MD.

## 2024-11-30 NOTE — ASSESSMENT & PLAN NOTE
5 day h/o sneezing, scratchy throat, cough and nasal congestion. Was prescribed promethazine syrup and prednisone 40 mg for 7 days at urgent care clinic, he is on day 3 of prednisone.  Will treat supportively off prednisone.    Plan:  Please follow-up with your PCP

## 2024-11-30 NOTE — DISCHARGE SUMMARY
Discharge Summary - Hospitalist   Name: Maurilio Connors 44 y.o. male I MRN: [id number]  Unit/Bed#: S -01 I Date of Admission: 11/25/2024   Date of Service: 11/30/2024 I Hospital Day: 5     Assessment & Plan  Atrial fibrillation with rapid ventricular response (HCC)  Patient with history of single episode of symptomatic A-fib with RVR (March 2024) on Toprol 100 mg daily and Eliquis, re-presenting with A-fib with RVR.  Workup in March was overall unrevealing with no clear exacerbating factors including thyroid disease, underlying heart disease, infection or alcohol use.  Lab Results   Component Value Date    [id number] 0.771 11/25/2024      Lab Results   Component Value Date    HGBA1C 5.9 (H) 07/17/2024      Imaging:   Chest x-ray prominence of pulmonary vasculature suggesting mild pulmonary vascular congestive change  11/27 Myocardial perfusion study  Perfusion: There is a left ventricular perfusion defect that is small in size present in the apical location(s) that is partially reversible.   [id number] 2    Plan:  Rate/Rhythm control: Metoprolol 125   AC: Continue Eliquis 5 mg twice daily  Monitoring on telemetry  heart cath 11/29   No ischemic changes no intervention was made  Dispo  Sleep study outpatient  Outpatient weight loss management    Pulmonary vascular congestion  Chest x-ray reveals prominence of pulmonary vasculature suggesting mild pulmonary vascular congestive change.  Patient appears to be mildly volume overloaded with leg edema and crackles.  He is saturating 93% on room air, no complaints of dyspnea or chest pain at this time.      Findings may be 2/2 pulmonary vascular resistance I/s/o BRITTON/obesity hypoventilation syndrome vs left heart disease I/s/o of tachycardia induced cardiomyopathy.  S/p 20 mg IV Lasix    Plan:  Please follow-up with your PCP  Nonspecific syndrome suggestive of viral illness  5 day h/o sneezing, scratchy throat, cough and nasal congestion. Was prescribed promethazine  syrup and prednisone 40 mg for 7 days at urgent care clinic, he is on day 3 of prednisone.  Will treat supportively off prednisone.    Plan:  Please follow-up with your PCP  Morbid obesity (HCC)  Outpatient sleep study? Did not because of transportation issues. Patient uses cane to ambulate, 375lb Body mass index is 50.24 kg/m².     Plan:  Please follow-up with weight loss program  Prediabetes  Lab Results   Component Value Date    HGBA1C 5.9 (H) 07/17/2024   Recent diagnosis of prediabetes, patient does not take any diabetes medications.  Was euglycemic PTA.  Will monitor off sliding scale  HTN (hypertension)  Blood Pressure: 143/79 will increase metoprolol XL/Toprol from 100 mg to 125 mg as above.     Medical Problems       Resolved Problems  Date Reviewed: 4/1/2024   None       Discharging Physician / Practitioner: Sánchez Arndt MD  PCP: Bello Sheikh DO  Admission Date:   Admission Orders (From admission, onward)       Ordered        11/25/24 1808  INPATIENT ADMISSION  Once                          Discharge Date: 11/30/24    Consultations During Hospital Stay:  Cardiology    Procedures Performed:   Cardioversion  Nuclear stress test  Coronary angiogram    Significant Findings / Test Results:   Small apical infarct with moderate clarisa-infarct ischemia on nuclear stress test  Coronary angiogram no occlusive CAD    Incidental Findings:   None      Test Results Pending at Discharge (will require follow up):   None     Outpatient Tests Requested:  Sleep study    Complications: None    Reason for Admission: A-fib with RVR    Hospital Course:   Maurilio Connors is a 44 y.o. male patient who originally presented to the hospital on 11/25/2024 due to A-fib with RVR    Hospital Course: Patient presented with chest pain and shortness of breath found to be in A-fib with RVR with pulse at 131.  EKG showed no ST changes, patient was subsequently placed on a Cardizem drip, heart rate has trended down averaging around high  "90s.  Patient was given Lasix 20 mg from pulmonary congestions.  Patient continued to be in A-fib rhythm.  Evaluated by cardiology, subsequently underwent cardioversion, converted to sinus rhythm. Nuclear test, shows small apical infarct with moderate clarisa-infarct ischemia.  Subsequently patient underwent coronary angiogram showing no occlusive CAD.  Patient remains in sinus rhythm event was observed overnight.  Was evaluated in the subsequent morning without any complications.  Patient was deemed to be stable for discharge.    Upon discharge patient will need to undergo sleep study and follow-up with cardiology within 2 weeks.      Please see above list of diagnoses and related plan for additional information.     Condition at Discharge: good    Discharge Day Visit / Exam:   Subjective:  Overnight: No acute events over night     Complaints: No complaints.     Patient denies Headache, Fever, Chills, Chest Pain, Shortness of breath, Nausea, or Vomiting, Abdominal Pain, Diarrhea, Swellings, Dysuria, Urgency, frequency, or incontinence, Hematuria, new or worsening anxiety or depression    Diet: Diet Cardiac   Bowel regimen:   Last BM Date: 11/29/24   DVT/VTE Prophylaxis: VTE covered by:  apixaban, Oral, 5 mg at 11/29/24 1828       Vitals: Blood Pressure: 143/79 (11/29/24 2230)  Pulse: 80 (11/29/24 1437)  Temperature: 97.6 °F (36.4 °C) (11/29/24 2230)  Temp Source: Oral (11/29/24 2230)  Respirations: 18 (11/29/24 2230)  Height: 5' 11\" (180.3 cm) (11/29/24 0459)  Weight - Scale: (!) 163 kg (360 lb 3.7 oz) (11/30/24 0522)  SpO2: 95 % (11/29/24 1437)  Physical Exam  Constitutional:       Appearance: He is obese. He is not toxic-appearing.   HENT:      Head: Normocephalic and atraumatic.      Mouth/Throat:      Mouth: Mucous membranes are moist.   Eyes:      Extraocular Movements: Extraocular movements intact.   Cardiovascular:      Rate and Rhythm: Normal rate and regular rhythm.      Heart sounds: No murmur " heard.  Pulmonary:      Effort: Pulmonary effort is normal.      Breath sounds: Normal breath sounds.   Abdominal:      Palpations: Abdomen is soft.   Musculoskeletal:      Cervical back: Neck supple.   Skin:     General: Skin is warm and dry.      Capillary Refill: Capillary refill takes less than 2 seconds.   Neurological:      Mental Status: He is alert.          Discussion with Family: Updated mother through phone.    Discharge instructions/Information to patient and family:   See after visit summary for information provided to patient and family.      Provisions for Follow-Up Care:  See after visit summary for information related to follow-up care and any pertinent home health orders.      Mobility at time of Discharge:   Basic Mobility Inpatient Raw Score: 23  JH-HLM Goal: 7: Walk 25 feet or more  JH-HLM Achieved: 7: Walk 25 feet or more  HLM Goal achieved. Continue to encourage appropriate mobility.     Disposition:   Home    Planned Readmission: none    Discharge Medications:  See after visit summary for reconciled discharge medications provided to patient and/or family.      Administrative Statements   Discharge Statement:  I have spent a total time of 30 minutes in caring for this patient on the day of the visit/encounter. .    **Please Note: This note may have been constructed using a voice recognition system**

## 2024-11-30 NOTE — CASE MANAGEMENT
Case Management Discharge Planning Note    Patient name Maurilio Connors  Location S /S -01 MRN [id number]  : 1980 Date 2024       Current Admission Date: 2024  Current Admission Diagnosis:Atrial fibrillation with rapid ventricular response (HCC)   Patient Active Problem List    Diagnosis Date Noted Date Diagnosed    Pulmonary vascular congestion 2024     Prediabetes 2024     Nonspecific syndrome suggestive of viral illness 2024     Atrial fibrillation with rapid ventricular response (HCC) 2024     Hypomagnesemia 2024     Gastroesophageal reflux disease 2022     Anxiety 2022     Morbid obesity (HCC) 2022     Left ureteral stone 2021     ADILSON (acute kidney injury) ([facility]) 2021     Constipation 2021     SIRS (systemic inflammatory response syndrome) ([facility]) 2021     Renal calculus 2021     Urethral stricture 2021     Difficulty urinating 10/19/2021     HTN (hypertension) 10/19/2021       LOS (days): 5  Geometric Mean LOS (GMLOS) (days): 2.3  Days to GMLOS:-2.4     OBJECTIVE:  Risk of Unplanned Readmission Score: 8.87         Current admission status: Inpatient   Preferred Pharmacy:   [id number] - [place] - [address] [place] [id number]  Phone: [phone] Fax: [phone]    [facility] [place] - [address]  [address] [place] [address]  Phone: [phone] Fax: [phone]    Primary Care Provider: Bello Sheikh DO    Primary Insurance: DxNA  Secondary Insurance:     DISCHARGE DETAILS:    Additional Comments: Pt is requesting a Lyft ride home. Lyft waiver signed and placed in Pts chart.

## 2024-11-30 NOTE — PLAN OF CARE
Problem: CARDIOVASCULAR - ADULT  Goal: Maintains optimal cardiac output and hemodynamic stability  Description: INTERVENTIONS:  - Monitor I/O, vital signs and rhythm  - Monitor for S/S and trends of decreased cardiac output  - Administer and titrate ordered vasoactive medications to optimize hemodynamic stability  - Assess quality of pulses, skin color and temperature  - Assess for signs of decreased coronary artery perfusion  - Instruct patient to report change in severity of symptoms  Outcome: Progressing  Goal: Absence of cardiac dysrhythmias or at baseline rhythm  Description: INTERVENTIONS:  - Continuous cardiac monitoring, vital signs, obtain 12 lead EKG if ordered  - Administer antiarrhythmic and heart rate control medications as ordered  - Monitor electrolytes and administer replacement therapy as ordered  Outcome: Progressing     Problem: PAIN - ADULT  Goal: Verbalizes/displays adequate comfort level or baseline comfort level  Description: Interventions:  - Encourage patient to monitor pain and request assistance  - Assess pain using appropriate pain scale  - Administer analgesics based on type and severity of pain and evaluate response  - Implement non-pharmacological measures as appropriate and evaluate response  - Consider cultural and social influences on pain and pain management  - Notify physician/advanced practitioner if interventions unsuccessful or patient reports new pain  Outcome: Progressing     Problem: INFECTION - ADULT  Goal: Absence or prevention of progression during hospitalization  Description: INTERVENTIONS:  - Assess and monitor for signs and symptoms of infection  - Monitor lab/diagnostic results  - Monitor all insertion sites, i.e. indwelling lines, tubes, and drains  - Monitor endotracheal if appropriate and nasal secretions for changes in amount and color  - [place] appropriate cooling/warming therapies per order  - Administer medications as ordered  - Instruct and encourage  patient and family to use good hand hygiene technique  - Identify and instruct in appropriate isolation precautions for identified infection/condition  Outcome: Progressing  Goal: Absence of fever/infection during neutropenic period  Description: INTERVENTIONS:  - Monitor WBC    Outcome: Progressing     Problem: SAFETY ADULT  Goal: Patient will remain free of falls  Description: INTERVENTIONS:  - Educate patient/family on patient safety including physical limitations  - Instruct patient to call for assistance with activity   - Consult OT/PT to assist with strengthening/mobility   - Keep Call bell within reach  - Keep bed low and locked with side rails adjusted as appropriate  - Keep care items and personal belongings within reach  - Initiate and maintain comfort rounds  - Make Fall Risk Sign visible to staff  - Offer Toileting every 2 Hours, in advance of need  - Initiate/Maintain bed alarm  - Apply yellow socks and bracelet for high fall risk patients  - Consider moving patient to room near nurses station  Outcome: Progressing  Goal: Maintain or return to baseline ADL function  Description: INTERVENTIONS:  -  Assess patient's ability to carry out ADLs; assess patient's baseline for ADL function and identify physical deficits which impact ability to perform ADLs (bathing, care of mouth/teeth, toileting, grooming, dressing, etc.)  - Assess/evaluate cause of self-care deficits   - Assess range of motion  - Assess patient's mobility; develop plan if impaired  - Assess patient's need for assistive devices and provide as appropriate  - Encourage maximum independence but intervene and supervise when necessary  - Involve family in performance of ADLs  - Assess for home care needs following discharge   - Consider OT consult to assist with ADL evaluation and planning for discharge  - Provide patient education as appropriate  Outcome: Progressing  Goal: Maintains/Returns to pre admission functional level  Description:  INTERVENTIONS:  - Perform AM-PAC 6 Click Basic Mobility/ Daily Activity assessment daily.  - Set and communicate daily mobility goal to care team and patient/family/caregiver.   - Collaborate with rehabilitation services on mobility goals if consulted  - Perform Range of Motion 3 times a day.  - Reposition patient every 2 hours.  - Dangle patient 3 times a day  - Stand patient 3 times a day  - Ambulate patient 3 times a day  - Out of bed to chair 3 times a day   - Out of bed for meals 3 times a day  - Out of bed for toileting  - Record patient progress and toleration of activity level   Outcome: Progressing     Problem: DISCHARGE PLANNING  Goal: Discharge to home or other facility with appropriate resources  Description: INTERVENTIONS:  - Identify barriers to discharge w/patient and caregiver  - Arrange for needed discharge resources and transportation as appropriate  - Identify discharge learning needs (meds, wound care, etc.)  - Arrange for interpretive services to assist at discharge as needed  - Refer to Case Management Department for coordinating discharge planning if the patient needs post-hospital services based on physician/advanced practitioner order or complex needs related to functional status, cognitive ability, or social support system  Outcome: Progressing     Problem: Knowledge Deficit  Goal: Patient/family/caregiver demonstrates understanding of disease process, treatment plan, medications, and discharge instructions  Description: Complete learning assessment and assess knowledge base.  Interventions:  - Provide teaching at level of understanding  - Provide teaching via preferred learning methods  Outcome: Progressing

## 2024-11-30 NOTE — ASSESSMENT & PLAN NOTE
Patient with history of single episode of symptomatic A-fib with RVR (March 2024) on Toprol 100 mg daily and Eliquis, re-presenting with A-fib with RVR.  Workup in March was overall unrevealing with no clear exacerbating factors including thyroid disease, underlying heart disease, infection or alcohol use.  Lab Results   Component Value Date    [id number] 0.771 11/25/2024      Lab Results   Component Value Date    HGBA1C 5.9 (H) 07/17/2024      Imaging:   Chest x-ray prominence of pulmonary vasculature suggesting mild pulmonary vascular congestive change  11/27 Myocardial perfusion study  Perfusion: There is a left ventricular perfusion defect that is small in size present in the apical location(s) that is partially reversible.   [id number] 2    Plan:  Rate/Rhythm control: Metoprolol 125   AC: Continue Eliquis 5 mg twice daily  Monitoring on telemetry  heart cath 11/29   No ischemic changes no intervention was made  Dispo  Sleep study outpatient  Outpatient weight loss management

## 2024-11-30 NOTE — PLAN OF CARE
Problem: CARDIOVASCULAR - ADULT  Goal: Maintains optimal cardiac output and hemodynamic stability  Description: INTERVENTIONS:  - Monitor I/O, vital signs and rhythm  - Monitor for S/S and trends of decreased cardiac output  - Administer and titrate ordered vasoactive medications to optimize hemodynamic stability  - Assess quality of pulses, skin color and temperature  - Assess for signs of decreased coronary artery perfusion  - Instruct patient to report change in severity of symptoms  Outcome: Progressing  Goal: Absence of cardiac dysrhythmias or at baseline rhythm  Description: INTERVENTIONS:  - Continuous cardiac monitoring, vital signs, obtain 12 lead EKG if ordered  - Administer antiarrhythmic and heart rate control medications as ordered  - Monitor electrolytes and administer replacement therapy as ordered  Outcome: Progressing     Problem: PAIN - ADULT  Goal: Verbalizes/displays adequate comfort level or baseline comfort level  Description: Interventions:  - Encourage patient to monitor pain and request assistance  - Assess pain using appropriate pain scale  - Administer analgesics based on type and severity of pain and evaluate response  - Implement non-pharmacological measures as appropriate and evaluate response  - Consider cultural and social influences on pain and pain management  - Notify physician/advanced practitioner if interventions unsuccessful or patient reports new pain  Outcome: Progressing     Problem: INFECTION - ADULT  Goal: Absence or prevention of progression during hospitalization  Description: INTERVENTIONS:  - Assess and monitor for signs and symptoms of infection  - Monitor lab/diagnostic results  - Monitor all insertion sites, i.e. indwelling lines, tubes, and drains  - Monitor endotracheal if appropriate and nasal secretions for changes in amount and color  - [place] appropriate cooling/warming therapies per order  - Administer medications as ordered  - Instruct and encourage  patient and family to use good hand hygiene technique  - Identify and instruct in appropriate isolation precautions for identified infection/condition  Outcome: Progressing  Goal: Absence of fever/infection during neutropenic period  Description: INTERVENTIONS:  - Monitor WBC    Outcome: Progressing

## 2024-11-30 NOTE — ASSESSMENT & PLAN NOTE
Outpatient sleep study? Did not because of transportation issues. Patient uses cane to ambulate, 375lb Body mass index is 50.24 kg/m².     Plan:  Please follow-up with weight loss program

## 2024-11-30 NOTE — ASSESSMENT & PLAN NOTE
Patient with history of PAF.  Diagnosed 3/2024.  Status post cardioversion at that time with restoration of sinus rhythm.  TTE at that time LV function normal.  No significant valvular heart disease.  Left and right atrial size normal.  At last follow-up visit with Dr. Brownlee  7/2024 patient with intermittent palpitations.  Declined an event recorder.  Presented with palpitations, this time also had chest discomfort with radiation to the left arm.    Recent URI symptoms.  ECG atrial fibrillation heart rate 120 bpm  Compliant with AC-Eliquis 5 mg every 12 hours  Prior to admission he was on Toprol 100 mg daily  Negative troponins.   Admitting potassium 4.6 with a magnesium of 2.1.  Mild leukocytosis.  TSH 0.771.    Started on  IV Cardizem gtt after bolus initially.  Beta-blocker- on Toprol 125 mg daily ( increased from home dose)   Given IV Lasix 20 mg 1 dose for mild pulmonary vascular congestion.  Diuresed 2500 cc  Cardioverted to sinus rhythm 11/27. TTE normal LV function.  Underwent nuclear stress test revealing a small apical infarct with moderate clarisa-infarct ischemia.  Underwent coronary angiogram today with no occlusive CAD.  Patient will require outpatient sleep study  Referral to weight management  Stable currently for discharge with outpatient follow-up from cardiac standpoint

## 2024-12-02 NOTE — UTILIZATION REVIEW
NOTIFICATION OF ADMISSION DISCHARGE   This is a Notification of Discharge from [facility]. Please be advised that this patient has been discharge from our facility. Below you will find the admission and discharge date and time including the patient’s disposition.   UTILIZATION REVIEW CONTACT:  Shira Mccann  Utilization   Network Utilization Review Department  Phone: [phone] x carefully listen to the prompts. All voicemails are confidential.  Email: [email or web address]     ADMISSION INFORMATION  PRESENTATION DATE: 11/25/2024  3:26 PM  OBERVATION ADMISSION DATE: N/A  INPATIENT ADMISSION DATE: 11/25/24  6:08 PM   DISCHARGE DATE: 11/30/2024 11:21 AM   DISPOSITION:Home/Self Care    Network Utilization Review Department  ATTENTION: Please call with any questions or concerns to [phone] and carefully listen to the prompts so that you are directed to the right person. All voicemails are confidential.   For Discharge needs, contact Care Management DC Support Team at [phone] opt. 2  Send all requests for admission clinical reviews, approved or denied determinations and any other requests to dedicated fax number below belonging to the campus where the patient is receiving treatment. List of dedicated fax numbers for the Facilities:  FACILITY NAME UR FAX NUMBER   ADMISSION DENIALS (Administrative/Medical Necessity) [phone]   DISCHARGE SUPPORT TEAM ([facility]) [phone]   PARENT CHILD HEALTH (Maternity/NICU/Pediatrics) [phone]   [facility] [phone]   [facility] [phone]   [facility] [phone]   [facility] [phone]   [facility] [phone]   [facility] [phone]   [facility] [phone]   [facility] [phone]    [facility] [phone]   [facility] [phone]   [facility] [phone]   [facility] [phone]

## 2024-12-20 ENCOUNTER — OFFICE VISIT (OUTPATIENT)
Dept: CARDIOLOGY CLINIC | Facility: CLINIC | Age: 44
End: 2024-12-20
Payer: COMMERCIAL

## 2024-12-20 VITALS
DIASTOLIC BLOOD PRESSURE: 90 MMHG | SYSTOLIC BLOOD PRESSURE: 148 MMHG | BODY MASS INDEX: 51.19 KG/M2 | OXYGEN SATURATION: 96 % | HEART RATE: 74 BPM | WEIGHT: 315 LBS

## 2024-12-20 DIAGNOSIS — R09.89 PULMONARY VASCULAR CONGESTION: ICD-10-CM

## 2024-12-20 DIAGNOSIS — R73.03 PREDIABETES: ICD-10-CM

## 2024-12-20 DIAGNOSIS — I10 PRIMARY HYPERTENSION: ICD-10-CM

## 2024-12-20 DIAGNOSIS — I48.0 PAF (PAROXYSMAL ATRIAL FIBRILLATION) (HCC): ICD-10-CM

## 2024-12-20 PROCEDURE — 99214 OFFICE O/P EST MOD 30 MIN: CPT

## 2024-12-20 PROCEDURE — 93000 ELECTROCARDIOGRAM COMPLETE: CPT

## 2024-12-20 NOTE — PROGRESS NOTES
Maurilio Fofanaadrian  1980  [id number]  [facility] [place] [facility] [address] [phone]    1. PAF (paroxysmal atrial fibrillation) ([facility])  Ambulatory referral to Cardiology    POCT ECG      2. Primary hypertension        3. Pulmonary vascular congestion        4. Prediabetes            Summary/Discussion:  Paroxymal atrial fibrillation:  - diagnosed in 3/2024 s/p cardioversion at that time with restoration of sinus rhythm.  TTE at that time LV function normal.  No significant valvular heart disease.  Left and right atrial size normal.  - hospital admission from 11/25/2024-11/30/2024 for atrial fibrillation w/ RVR in which he was again cardioverted to sinus rhythm   - echo (11/27/2024): normal LV function  - NM rx stress test preformed due to c/o chest pain on 11/27/2024 revealing a small apical infarct with moderate clarisa-infarct ischemia. Subsequently he underwent cardiac catheterization on 11/29/2024 which was unremarkable for CAD  - remains in sinus rhythm based on office EKG   - anticoagulation on eliquis 5 mg twice daily in which he will need to be on uninterrupted anticoagulation for 4 weeks s/p cardioversion  - continue metoprolol succinate 125 mg daily  - plans to follow up with EP, Dr. Solo on 1/6/2025 to discuss ablation vs antiarrythmic therapy   - active referral to sleep medicine and weight management  - he has declined an event recorder in the past  - symptomatic when in atrial fibrillation     HTN (hypertension)  - elevated today, 148/90  - per chart review blood pressures appear to be labile  - continue present medication regimen at this time  - lifestyle modification   - close blood pressure monitoring   - active referral to sleep medicine to rule out sleep apnea     Pulmonary vascular congestion  - noted during most recent hospitalization in the setting of atrial fibrillation with RVR  - he received IV lasix x 1 with good diuresis  - no  prior hx of HF and not on any diuretics at this time  - volume status difficult to assess due to body habitus     Dyslipidemia:  - Lipid Profile:    Latest Reference Range & Units 07/17/24 09:49   Cholesterol See Comment mg/dL 127   Triglycerides See Comment mg/dL 126   HDL >=40 mg/dL 37 (L)   LDL Calculated 0 - 100 mg/dL 65   - lipids stable with diet alone   - encouraged low cholesterol, mediterranean diet, and annual lipid follow up    Prediabetes  - A1c: 5.9      Interval History: Maurilio Connors is a 44 y.o. year old male with history mentioned in problem list who presents to the office today for a hospital follow up.     Since his recent hospital admission he does not express any significant cardiac complaints. He denies any chest pain/pressure/discomfort or shortness of breath. He denies worsening lower extremity edema (chronic), orthopnea, and PND. He denies lightheadedness, dizziness, and syncope. He denies recurrent palpitations. He has been adherent to his medication regimen.       He will RTO on 1/29/2025 with Dr. Brownlee or sooner if necessary. He will call with any concerns.       Medical Problems       Problem List       Difficulty urinating    HTN (hypertension)    SIRS (systemic inflammatory response syndrome) (HCC)    Renal calculus    Urethral stricture    Left ureteral stone    ADILSON (acute kidney injury) (HCC)    Constipation    Gastroesophageal reflux disease    Anxiety    Morbid obesity (HCC)    Atrial fibrillation with rapid ventricular response (HCC)    Hypomagnesemia    Pulmonary vascular congestion    Prediabetes    Nonspecific syndrome suggestive of viral illness        Past Medical History:   Diagnosis Date    ADILSON (acute kidney injury) (HCC)     Anxiety     Atrial fibrillation (HCC)     GERD (gastroesophageal reflux disease)     Hypertension     Kidney stone     Murmur, cardiac     Stress incontinence     Urethral stricture     Use of cane as ambulatory aid     as needed    Wears glasses       Social History     Socioeconomic History    Marital status: Single     Spouse name: Not on file    Number of children: Not on file    Years of education: Not on file    Highest education level: Not on file   Occupational History    Not on file   Tobacco Use    Smoking status: Former     Types: Cigarettes    Smokeless tobacco: Never   Vaping Use    Vaping status: Never Used   Substance and Sexual Activity    Alcohol use: Not Currently    Drug use: Never    Sexual activity: Not Currently   Other Topics Concern    Not on file   Social History Narrative    Not on file     Social Drivers of Health     Financial Resource Strain: Not on file   Food Insecurity: No Food Insecurity (3/22/2024)    Nursing - Inadequate Food Risk Classification     Worried About Running Out of Food in the Last Year: Never true     Ran Out of Food in the Last Year: Never true     Ran Out of Food in the Last Year: Not on file   Transportation Needs: No Transportation Needs (3/22/2024)    PRAPARE - Transportation     Lack of Transportation (Medical): No     Lack of Transportation (Non-Medical): No   Physical Activity: Not on file   Stress: Not on file   Social Connections: Not on file   Intimate Partner Violence: Not on file   Housing Stability: Low Risk  (3/22/2024)    Housing Stability Vital Sign     Unable to Pay for Housing in the Last Year: No     Number of Times Moved in the Last Year: 1     Homeless in the Last Year: No      History reviewed. No pertinent family history.  Past Surgical History:   Procedure Laterality Date    CARDIAC CATHETERIZATION Left 11/29/2024    Procedure: Cardiac Left Heart Cath;  Surgeon: Oskar Dsouza DO;  [facility];  Service: Cardiology    CHOLECYSTECTOMY      2004    CYSTOSCOPY N/A 10/19/2021    Procedure: CYSTOSCOPY;  Surgeon: Pilo Horton MD;  Location: [facility];  Service: Urology    [place] CYSTO BLADDER W/URETERAL CATHETERIZATION Left 12/29/2021    Procedure: CYSTOSCOPY  RETROGRADE PYELOGRAM WITH INSERTION STENT URETERAL LEFT, DVIU;  Surgeon: Ayush Holland MD;  Location: [facility];  Service: Urology    MN CYSTO/URETERO W/LITHOTRIPSY &INDWELL STENT INSRT Left 1/14/2022    Procedure: CYSTOSCOPY, left URETEROSCOPY, removal of migrated stent, insertion new left ureteral stent placement ;  Surgeon: Ayush Holland MD;  Location: [facility];  Service: Urology    MN CYSTO/URETERO W/LITHOTRIPSY &INDWELL STENT INSRT Left 2/23/2022    Procedure: CYSTOSCOPY, URETEROSCOPY, BASKET EXTRACTION, STENT EXCHANGE LEFT URETER;  Surgeon: Ayush Holland MD;  [facility]: [facility];  Service: Urology    MN CYSTOURETHROSCOPY W/INTERNAL URETHROTOMY N/A 10/19/2021    Procedure: DIRECT VISUAL INTERAL URETHROTOMY (DVIU);  Surgeon: Pilo Horton MD;  [facility];  Service: Urology       Current Outpatient Medications:     acetaminophen (TYLENOL) 325 mg tablet, Take 2 tablets (650 mg total) by mouth every 6 (six) hours as needed for mild pain, Disp: , Rfl: 0    apixaban (ELIQUIS) 5 mg, Take 1 tablet (5 mg total) by mouth 2 (two) times a day, Disp: 60 tablet, Rfl: 5    metoprolol succinate (TOPROL-XL) 25 mg 24 hr tablet, Take 5 tablets (125 mg total) by mouth daily, Disp: 150 tablet, Rfl: 0    omeprazole (PriLOSEC) 20 mg delayed release capsule, Take 1 capsule (20 mg total) by mouth daily, Disp: 30 capsule, Rfl: 3    docusate sodium (COLACE) 100 mg capsule, Take 1 capsule (100 mg total) by mouth as needed for constipation (Patient not taking: Reported on 12/20/2024), Disp: , Rfl:   No Known Allergies    Labs:     Chemistry        Component Value Date/Time     05/01/2017 0705    K 4.0 11/30/2024 0610    K 4.2 12/07/2023 0835     11/30/2024 0610     12/07/2023 0835    CO2 30 11/30/2024 0610    CO2 28 12/07/2023 0835    BUN 14 11/30/2024 0610    BUN 16 12/07/2023 0835    CREATININE 0.82 11/30/2024 0610    CREATININE 0.67 12/07/2023 0835        Component Value Date/Time    CALCIUM  "9.2 11/30/2024 0610    CALCIUM 9.2 12/07/2023 0835    ALKPHOS 35 11/25/2024 1617    ALKPHOS 33 (L) 12/07/2023 0835    AST 28 11/25/2024 1617    AST 16 12/07/2023 0835    ALT 18 11/25/2024 1617    ALT 15 12/07/2023 0835    BILITOT 0.5 05/01/2017 0705            Lab Results   Component Value Date    CHOL 134 05/01/2017    CHOL 143 08/17/2016    CHOL 137 09/05/2015     Lab Results   Component Value Date    HDL 37 (L) 07/17/2024    HDL 28 (L) 05/01/2017    HDL 33 (L) 08/17/2016     Lab Results   Component Value Date    LDLCALC 65 07/17/2024     Lab Results   Component Value Date    TRIG 126 07/17/2024    TRIG 247 (H) 05/01/2017    TRIG 188 (H) 08/17/2016     No results found for: \"CHOLHDL\"    Imaging: Cardiac catheterization  Result Date: 11/29/2024  Narrative:   No significant obstructive epicardial CAD angiographically, normal LVEDP     Stress strip  Result Date: 11/28/2024  Narrative: Confirmed by CAESAR MARTINO ([id number]),  Clementina Thomas ([id number]) on 11/28/2024 8:10:15 AM    NM Myocardial Perfusion Spect (Pharmacological Induced Stress and/or Rest)  Result Date: 11/27/2024  Narrative:   Stress ECG: No ST deviation is noted. The ECG was not diagnostic due to pharmacological (vasodilator) stress.   Stress Function: Left ventricular function post-stress is normal. Stress ejection fraction is 53%.   Stress Combined Conclusion: The ECG and SPECT imaging portions of the stress study are concordant with no evidence of stress induced myocardial ischemia. Left ventricular perfusion is probably abnormal. There is clarisa-infarct ischemia. Findings are consistent with infarction.   Perfusion: There is a left ventricular perfusion defect that is small in size present in the apical location(s) that is partially reversible. Abnormal pharmacologic nuclear stress test.  Small apical infarct with moderate clarisa-infarct ischemia.      Cardioversion  Result Date: 11/27/2024  Narrative: Cardioversion Maurilio Connors is a 44 y.o. male who " follows with Dr. Brownlee in the office. Indication for procedure: Cardioversion Requesting physician: Owen Anticoagulation: Eliquis The patient was identified in the procedure lab. A time out procedure was performed. The patient was sedated by the anesthesia service with propofol. The patient was cardioverted to sinus rhythm with a synchronized 200J biphasic shock.  There were no complications. The patient was monitored for the appropriate time interval in the holding area, and was transferred back to the medical floor in stable condition.    Echo complete w/ contrast if indicated  Result Date: 11/27/2024  Narrative:   Left Ventricle: Left ventricular cavity size is normal. Wall thickness is mildly increased. The left ventricular ejection fraction is 55%. Systolic function is normal. Wall motion is normal.   Right Ventricle: Right ventricular cavity size is normal. Systolic function is normal.   Left Atrium: The atrium is mildly dilated.   Right Atrium: The atrium is mildly dilated.   Mitral Valve: There is trace regurgitation.   Prior SANDHYA study available for comparison. Prior study date: 3/22/2024. No significant changes noted compared to the prior study.     XR chest 2 views  Result Date: 11/25/2024  Narrative: XR CHEST PA AND LATERAL INDICATION: Chest pain. COMPARISON: December 28, 2021 FINDINGS: Prominence of pulmonary vasculature suggesting mild pulmonary vascular congestive change. Lungs are otherwise clear. No pneumothorax or pleural effusion. Normal cardiomediastinal silhouette. Bones are unremarkable for age. Normal upper abdomen.     Impression: Prominence of pulmonary vasculature suggesting mild pulmonary vascular congestive change. Workstation performed: [id number]       ECG:  Sinus rhythm cannot rule out inferior/anterior infarct    Review of Systems   All other systems reviewed and are negative.      Vitals:    12/20/24 0803   BP: 148/90   Pulse: 74   SpO2: 96%     Vitals:    12/20/24 0803   Weight:  (!) 166 kg (367 lb)         Body mass index is 51.19 kg/m².    Physical Exam  Vitals and nursing note reviewed.   Constitutional:       General: He is not in acute distress.     Appearance: He is obese. He is not ill-appearing.   HENT:      Head: Normocephalic.      Nose: Nose normal.      Mouth/Throat:      Mouth: Mucous membranes are moist.      Pharynx: Oropharynx is clear.   Cardiovascular:      Rate and Rhythm: Normal rate and regular rhythm.      Heart sounds: No murmur heard.  Pulmonary:      Breath sounds: Decreased breath sounds present.   Musculoskeletal:      Cervical back: Normal range of motion.      Right lower leg: Edema (appears chronic) present.      Left lower leg: Edema (appears chronic) present.   Skin:     General: Skin is warm and dry.      Comments: PVD B/L LEs   Neurological:      Mental Status: He is alert.   Psychiatric:         Mood and Affect: Affect is flat.

## 2025-01-29 ENCOUNTER — OFFICE VISIT (OUTPATIENT)
Dept: CARDIOLOGY CLINIC | Facility: MEDICAL CENTER | Age: 45
End: 2025-01-29
Payer: COMMERCIAL

## 2025-01-29 VITALS
BODY MASS INDEX: 44.1 KG/M2 | HEIGHT: 71 IN | WEIGHT: 315 LBS | DIASTOLIC BLOOD PRESSURE: 80 MMHG | OXYGEN SATURATION: 95 % | HEART RATE: 84 BPM | SYSTOLIC BLOOD PRESSURE: 140 MMHG

## 2025-01-29 DIAGNOSIS — I10 PRIMARY HYPERTENSION: Primary | ICD-10-CM

## 2025-01-29 DIAGNOSIS — I48.0 PAF (PAROXYSMAL ATRIAL FIBRILLATION) (HCC): ICD-10-CM

## 2025-01-29 PROCEDURE — 99214 OFFICE O/P EST MOD 30 MIN: CPT | Performed by: INTERNAL MEDICINE

## 2025-01-29 NOTE — PROGRESS NOTES
Cardiology Follow Up    Maurilio Connors  1980  [id number]  [facility] [place]  [address]  [address]  [facility] [address] [phone]    No diagnosis found.    There are no diagnoses linked to this encounter.  I had the pleasure of seeing Maurilio Connors for a follow up visit.     INTERVAL HISTORY: see below    History of the presenting illness, Discussion/Summary and My Plan are as follows:::    Maurilio is a 44-year-old male with history of hypertension, hypertriglyceridemia but not on last blood work-December 2023, obesity-BMI of 52, 'stiff neck' without a clear diagnosis (XRay negative per pt), who presented with palpitations-with new onset atrial fibrillation-March 2024, underwent SANDHYA-cardioversion-single 300 joules biphasic shock, successful and discharged in sinus rhythm on metoprolol 50 mg twice daily (he was previously on lisinopril 40 mg which was not continued since he was going on metoprolol).  At his last visit with me he was on metoprolol 50 mg daily plus apixaban 5 mg twice daily but complained of palpitations and a 2-week cardiac rhythm monitor was offered, he declined..  Metoprolol dose was increased to 100 mg    In November 2024, presented with rapid atrial fibrillation, mild pulmonary vascular congestion, received a dose of IV furosemide, ultimately metoprolol dose was uptitrated to the current dose of 125 mg daily and also underwent a cardioversion without SANDHYA since he has been compliant with the apixaban.    Since he also had some chest discomfort, underwent a stress test that suggested apical infarct and underwent coronary angiography that was negative for epicardial obstructive CAD.  He is also due to see electrophysiology-Dr. Solo and has a sleep study that is pending as well.  Unfortunately he does not carry a cell phone, does have a landline at home, and does not have transportation,     No  longer has palpitations.  He was in sinus rhythm at his recent visit in the office with Belle in December 2024.  Clinically as well he is in sinus rhythm.    Plan:    Atrial fibrillation: 2 episodes-  Original diagnosis--symptomatic-with palpitations-March 2024, no clear triggers, normal TSH and unremarkable echo, ultimately discharged on metoprolol and apixaban.  And was doing well on metoprolol XL 50 mg daily plus apixaban at his last visit in July 2024 but metoprolol was increased to 100 in the setting of elevated blood pressures    November 2024: Second episode, no clear trigger, also had mild pulmonary vascular congestion and needed a dose of IV furosemide, ultimately underwent cardioversion without SANDHYA and currently on metoprolol 125 mg daily, tolerating it well with no palpitations and has been compliant with his apixaban as well.  Might have obstructive sleep apnea-sleep study is pending    EP eval is pending for rhythm control options, Class Ic agents are an easy option as well    Hypertension: No change to medications at this time. Advised to check at home and send me a log    History of hypertriglyceridemia: Not in December 2023 and on subsequent lipid profile as well, A1c was unremarkable in March 2024, r    Socio economic barriers: Does not work, does not carry his cell phone, does not have his own car and does not drive.    Follow-up in 6 months     Latest Reference Range & Units 05/01/17 07:05 07/17/24 09:49   Cholesterol See Comment mg/dL 134 127   Triglycerides See Comment mg/dL 247 (H) 126   HDL >=40 mg/dL 28 (L) 37 (L)   Non-HDL Cholesterol mg/dL (calc) 106    LDL CHOLESTEROL <130 mg/dL (calc) 57    LDL Calculated 0 - 100 mg/dL  65   Chol/HDL Ratio < OR = 5.0 (calc) 4.8    (H): Data is abnormally high  (L): Data is abnormally low     Latest Reference Range & Units 11/29/24 05:20 11/30/24 06:10   BUN 5 - 25 mg/dL 14 14   Creatinine 0.60 - 1.30 mg/dL 0.82 0.82      Latest Reference Range & Units  Most Recent   TSH 3RD GENERATON 0.450 - 4.500 uIU/mL 0.771  11/25/24 16:17          Latest Reference Range & Units 03/22/24 06:33   Hemoglobin A1C  5.6   eAG, EST AVG Glucose mg/dl 114     Component 12/07/23 05/30/23 05/23/22 10/27/21 04/12/21 09/17/20   Cholesterol 126 124 140 137 135 151   Triglyceride 104 135 184 High  180 High  166 High  111   Cholesterol, HDL, Direct 32 35 35 Low  31 Low  35 Low  41   Cholesterol, Non-HDL 94 89 105  106  100  110    Cholesterol, LDL, Calculated 73  62  68  70  67  88    CHOL/HDL Ratio 3.9 3.5 4.00  4.42  3.86  3.68       Latest Reference Range & Units Most Recent   TSH, POC 0.45 - 5.33 uIU/mL 1.72 (E)  12/7/23 08:35   TSH 3RD GENERATON 0.450 - 4.500 uIU/mL 1.379  3/21/24 11:05   (E): External lab result    Patient Active Problem List   Diagnosis    Difficulty urinating    HTN (hypertension)    SIRS (systemic inflammatory response syndrome) (HCC)    Renal calculus    Urethral stricture    Left ureteral stone    ADILSON (acute kidney injury) (HCC)    Constipation    Gastroesophageal reflux disease    Anxiety    Morbid obesity (HCC)    PAF (paroxysmal atrial fibrillation) (HCC)    Hypomagnesemia    Pulmonary vascular congestion    Prediabetes    Nonspecific syndrome suggestive of viral illness     Past Medical History:   Diagnosis Date    ADILSON (acute kidney injury) (HCC)     Anxiety     Atrial fibrillation (HCC)     GERD (gastroesophageal reflux disease)     Hypertension     Kidney stone     Murmur, cardiac     Stress incontinence     Urethral stricture     Use of cane as ambulatory aid     as needed    Wears glasses      Social History     Socioeconomic History    Marital status: Single     Spouse name: Not on file    Number of children: Not on file    Years of education: Not on file    Highest education level: Not on file   Occupational History    Not on file   Tobacco Use    Smoking status: Former     Types: Cigarettes    Smokeless tobacco: Never   Vaping Use    Vaping status: Never  Used   Substance and Sexual Activity    Alcohol use: Not Currently    Drug use: Never    Sexual activity: Not Currently   Other Topics Concern    Not on file   Social History Narrative    Not on file     Social Drivers of Health     Financial Resource Strain: Not on file   Food Insecurity: No Food Insecurity (3/22/2024)    Nursing - Inadequate Food Risk Classification     Worried About Running Out of Food in the Last Year: Never true     Ran Out of Food in the Last Year: Never true     Ran Out of Food in the Last Year: Not on file   Transportation Needs: No Transportation Needs (3/22/2024)    PRAPARE - Transportation     Lack of Transportation (Medical): No     Lack of Transportation (Non-Medical): No   Physical Activity: Not on file   Stress: Not on file   Social Connections: Not on file   Intimate Partner Violence: Not on file   Housing Stability: Low Risk  (3/22/2024)    Housing Stability Vital Sign     Unable to Pay for Housing in the Last Year: No     Number of Times Moved in the Last Year: 1     Homeless in the Last Year: No      No family history on file.  Past Surgical History:   Procedure Laterality Date    CARDIAC CATHETERIZATION Left 11/29/2024    Procedure: Cardiac Left Heart Cath;  Surgeon: Oskar Dsouza DO;  [facility];  Service: Cardiology    CHOLECYSTECTOMY      2004    CYSTOSCOPY N/A 10/19/2021    Procedure: CYSTOSCOPY;  Surgeon: Pilo Horton MD;  [facility];  Service: Urology    [place] CYSTO BLADDER W/URETERAL CATHETERIZATION Left 12/29/2021    Procedure: CYSTOSCOPY RETROGRADE PYELOGRAM WITH INSERTION STENT URETERAL LEFT, DVIU;  Surgeon: Ayush Holland MD;  [facility];  Service: Urology    MA CYSTO/URETERO W/LITHOTRIPSY &INDWELL STENT INSRT Left 1/14/2022    Procedure: CYSTOSCOPY, left URETEROSCOPY, removal of migrated stent, insertion new left ureteral stent placement ;  Surgeon: Ayush Holland MD;  [facility];  Service: Urology    MA  "CYSTO/URETERO W/LITHOTRIPSY &INDWELL STENT INSRT Left 2/23/2022    Procedure: CYSTOSCOPY, URETEROSCOPY, BASKET EXTRACTION, STENT EXCHANGE LEFT URETER;  Surgeon: Ayush Holland MD;  [facility];  Service: Urology    [place] CYSTOURETHROSCOPY W/INTERNAL URETHROTOMY N/A 10/19/2021    Procedure: DIRECT VISUAL INTERAL URETHROTOMY (DVIU);  Surgeon: Pilo Horton MD;  [facility];  Service: Urology       Current Outpatient Medications:     acetaminophen (TYLENOL) 325 mg tablet, Take 2 tablets (650 mg total) by mouth every 6 (six) hours as needed for mild pain, Disp: , Rfl: 0    apixaban (ELIQUIS) 5 mg, Take 1 tablet (5 mg total) by mouth 2 (two) times a day, Disp: 60 tablet, Rfl: 5    metoprolol succinate (TOPROL-XL) 25 mg 24 hr tablet, Take 5 tablets (125 mg total) by mouth daily, Disp: 150 tablet, Rfl: 0    omeprazole (PriLOSEC) 20 mg delayed release capsule, Take 1 capsule (20 mg total) by mouth daily, Disp: 30 capsule, Rfl: 3    docusate sodium (COLACE) 100 mg capsule, Take 1 capsule (100 mg total) by mouth as needed for constipation (Patient not taking: Reported on 12/20/2024), Disp: , Rfl:   No Known Allergies    Imaging: No results found.    Review of Systems:  Review of Systems   Constitutional: Negative.    HENT: Negative.     Eyes: Negative.    Respiratory: Negative.     Cardiovascular: Negative.    Endocrine: Negative.    Musculoskeletal: Negative.        Physical Exam:  /80 (BP Location: Left arm, Patient Position: Sitting, Cuff Size: Large)   Pulse 84   Ht 5' 11\" (1.803 m)   Wt (!) 168 kg (371 lb)   SpO2 95%   BMI 51.74 kg/m²   Physical Exam  Constitutional:       General: He is not in acute distress.     Appearance: He is not ill-appearing, toxic-appearing or diaphoretic.   HENT:      Nose: Nose normal. No congestion or rhinorrhea.   Cardiovascular:      Rate and Rhythm: Normal rate.      Pulses:           Carotid pulses are 2+ on the right side and 2+ on the left side.     " "Heart sounds: Normal heart sounds. Heart sounds not distant. No murmur heard.     No systolic murmur is present.   Pulmonary:      Effort: Pulmonary effort is normal. No tachypnea or respiratory distress.      Breath sounds: Normal breath sounds. No stridor. No decreased breath sounds or wheezing.   Abdominal:      General: There is no abdominal bruit.      Palpations: Abdomen is soft. There is no fluid wave, hepatomegaly or splenomegaly.   Skin:     General: Skin is warm.      Coloration: Skin is not cyanotic or pale.      Findings: No ecchymosis or erythema.   Neurological:      Mental Status: He is alert.         This note was completed in part utilizing [employer] direct voice recognition software.   Grammatical errors, random word insertion, spelling mistakes, occasional wrong word or \"sound-alike\" substitutions and incomplete sentences may be an occasional consequence of the system secondary to software limitations, ambient noise and hardware issues. At the time of dictation, efforts were made to edit, clarify and /or correct errors.  Please read the chart carefully and recognize, using context, where substitutions have occurred.  If you have any questions or concerns about the context, text or information contained within the body of this dictation, please contact myself, the provider, for further clarification.  "

## 2025-01-29 NOTE — PATIENT INSTRUCTIONS
Please check your blood pressures-in a seated posture, empty bladder; while you are relaxed, for at least for 5-10 minutes, preferably arm blood pressures-please check it twice daily for 1 week and send me the blood pressure log.    If you have started a new medication or there has been a change in the dose of a current medication, wait 1 week for the medication to take effect/BP to stablize, prior to assessing your blood pressures  Continue dietary and lifestyle changes, limiting salt intake

## 2025-03-19 ENCOUNTER — OFFICE VISIT (OUTPATIENT)
Dept: CARDIOLOGY CLINIC | Facility: CLINIC | Age: 45
End: 2025-03-19
Payer: COMMERCIAL

## 2025-03-19 VITALS
DIASTOLIC BLOOD PRESSURE: 92 MMHG | WEIGHT: 315 LBS | HEART RATE: 81 BPM | SYSTOLIC BLOOD PRESSURE: 152 MMHG | HEIGHT: 71 IN | BODY MASS INDEX: 44.1 KG/M2

## 2025-03-19 DIAGNOSIS — I48.0 PAF (PAROXYSMAL ATRIAL FIBRILLATION) (HCC): Primary | ICD-10-CM

## 2025-03-19 DIAGNOSIS — I10 PRIMARY HYPERTENSION: ICD-10-CM

## 2025-03-19 PROCEDURE — 99245 OFF/OP CONSLTJ NEW/EST HI 55: CPT | Performed by: INTERNAL MEDICINE

## 2025-03-19 PROCEDURE — 93000 ELECTROCARDIOGRAM COMPLETE: CPT | Performed by: INTERNAL MEDICINE

## 2025-03-19 NOTE — PROGRESS NOTES
CARDIOLOGY OFFICE VISIT   EP CONSULT  PATIENT INFORMATION     Maurilio Connors   44 y.o. male   MRN: [id number]    ASSESSMENT/PLAN     Paroxysmal A-fib  Patient had 2 episodes of A-fib with RVR in 2024 requiring ER visit.  Both times he was cardioverted.  Second episode in November 2024 required 5-day admission and IV diuresis.  Patient denies any recurrent episodes since then.  Patient presenting to discuss A-fib ablation.  A-fib ablation was discussed in detail with patient.  Risk and benefits discussed and all questions answered.  Patient will think about ablation and will get back to us in the future if interested.  C/w Eliquis and Toprol XL.   Educated on completing sleep study.   Prn follow up.         Diagnoses and all orders for this visit:    PAF (paroxysmal atrial fibrillation) (HCC)  -     POCT ECG    Primary hypertension  -     POCT ECG      Schedule a follow-up appointment in prn.    HEALTH MAINTENANCE     Immunization History   Administered Date(s) Administered    COVID-19 MODERNA VACC 0.5 ML IM 03/12/2021, 04/09/2021, 11/09/2021    COVID-19 Moderna Vac BIVALENT 12 Yr+ IM 0.5 ML 10/27/2022    INFLUENZA 11/17/2014, 09/14/2016, 10/12/2018, 10/28/2020, 12/06/2021, 01/09/2023    Influenza Quadrivalent, 6-35 Months IM 09/20/2017    Influenza, seasonal, injectable 02/09/2011, 11/20/2013    Influenza, seasonal, injectable, preservative free 12/11/2024    Tdap 12/13/2018     CHIEF COMPLAINT     Chief Complaint   Patient presents with    Follow-up     Follow up to Paroxysmal A- Fib  Patient states: no complaints at this time  Going for a sleep study       HISTORY OF PRESENT ILLNESS     3/19/25: [place] [facility] [place]. He has had 2 hospitalizations for afib with RVR and DCCV. He plans to pursue BRITTON treatment.  His first hospitalization was brief and he was cardioverted to sinus rhythm after SANDHYA.  His second hospitalization was for 5 days towards the end of 2024 when he was admitted for  "A-fib with RVR and also required IV Lasix for volume overload.  He has done since then without any recurrent A-fib with RVR episodes.    REVIEW OF SYSTEMS     Review of Systems   Constitutional:  Negative for activity change, appetite change, chills and diaphoresis.   Respiratory:  Negative for apnea, cough, chest tightness and shortness of breath.    Cardiovascular:  Positive for palpitations. Negative for chest pain and leg swelling.   Gastrointestinal:  Negative for abdominal distention, abdominal pain, constipation and diarrhea.     OBJECTIVE     Vitals:    03/19/25 1238   BP: 152/92   Patient Position: Sitting   Cuff Size: Adult   Pulse: 81   Weight: (!) 167 kg (367 lb 14.4 oz)   Height: 5' 11\" (1.803 m)     Physical Exam  Vitals reviewed.   Constitutional:       General: He is not in acute distress.     Appearance: He is well-developed. He is obese. He is not diaphoretic.   Cardiovascular:      Rate and Rhythm: Normal rate and regular rhythm.      Heart sounds: Normal heart sounds. No murmur heard.     No friction rub.   Pulmonary:      Effort: Pulmonary effort is normal. No respiratory distress.      Breath sounds: Normal breath sounds. No stridor. No wheezing.   Psychiatric:         Mood and Affect: Mood normal.         Thought Content: Thought content normal.         Judgment: Judgment normal.       CURRENT MEDICATIONS     Current Outpatient Medications:     acetaminophen (TYLENOL) 325 mg tablet, Take 2 tablets (650 mg total) by mouth every 6 (six) hours as needed for mild pain, Disp: , Rfl: 0    metoprolol succinate (TOPROL-XL) 25 mg 24 hr tablet, Take 5 tablets (125 mg total) by mouth daily, Disp: 150 tablet, Rfl: 0    omeprazole (PriLOSEC) 20 mg delayed release capsule, Take 1 capsule (20 mg total) by mouth daily, Disp: 30 capsule, Rfl: 3    apixaban (ELIQUIS) 5 mg, Take 1 tablet (5 mg total) by mouth 2 (two) times a day, Disp: 60 tablet, Rfl: 5    docusate sodium (COLACE) 100 mg capsule, Take 1 capsule " (100 mg total) by mouth as needed for constipation (Patient not taking: Reported on 12/20/2024), Disp: , Rfl:     PAST MEDICAL & SURGICAL HISTORY     Past Medical History:   Diagnosis Date    ADILSON (acute kidney injury) (HCC)     Anxiety     Atrial fibrillation (HCC)     GERD (gastroesophageal reflux disease)     Hypertension     Kidney stone     Murmur, cardiac     Stress incontinence     Urethral stricture     Use of cane as ambulatory aid     as needed    Wears glasses      Past Surgical History:   Procedure Laterality Date    CARDIAC CATHETERIZATION Left 11/29/2024    Procedure: Cardiac Left Heart Cath;  Surgeon: Oskar Dsouza DO;  [facility];  Service: Cardiology    CHOLECYSTECTOMY      2004    CYSTOSCOPY N/A 10/19/2021    Procedure: CYSTOSCOPY;  Surgeon: Pilo Horton MD;  [facility];  Service: Urology    WA CYSTO/URETERO W/LITHOTRIPSY &INDWELL STENT INSRT Left 1/14/2022    Procedure: CYSTOSCOPY, left URETEROSCOPY, removal of migrated stent, insertion new left ureteral stent placement ;  Surgeon: Ayush Holland MD;  [facility];  Service: Urology    WA CYSTO/URETERO W/LITHOTRIPSY &INDWELL STENT INSRT Left 2/23/2022    Procedure: CYSTOSCOPY, URETEROSCOPY, BASKET EXTRACTION, STENT EXCHANGE LEFT URETER;  Surgeon: Ayush Holland MD;  [facility];  Service: Urology    WA CYSTOURETHROSCOPY W/INTERNAL URETHROTOMY N/A 10/19/2021    Procedure: DIRECT VISUAL INTERAL URETHROTOMY (DVIU);  Surgeon: Pilo Horton MD;  [facility];  Service: Urology    WA CYSTOURETHROSCOPY W/URETERAL CATHETERIZATION Left 12/29/2021    Procedure: CYSTOSCOPY RETROGRADE PYELOGRAM WITH INSERTION STENT URETERAL LEFT, DVIU;  Surgeon: Ayush Holland MD;  [facility];  Service: Urology     SOCIAL & FAMILY HISTORY     Social History     Socioeconomic History    Marital status: Single     Spouse name: Not on file    Number of children: Not on file    Years of  education: Not on file    Highest education level: Not on file   Occupational History    Not on file   Tobacco Use    Smoking status: Former     Types: Cigarettes    Smokeless tobacco: Never   Vaping Use    Vaping status: Never Used   Substance and Sexual Activity    Alcohol use: Not Currently    Drug use: Never    Sexual activity: Not Currently   Other Topics Concern    Not on file   Social History Narrative    Not on file     Social Drivers of Health     Financial Resource Strain: Not on file   Food Insecurity: No Food Insecurity (3/22/2024)    Nursing - Inadequate Food Risk Classification     Worried About Running Out of Food in the Last Year: Never true     Ran Out of Food in the Last Year: Never true     Ran Out of Food in the Last Year: Not on file   Transportation Needs: No Transportation Needs (3/22/2024)    PRAPARE - Transportation     Lack of Transportation (Medical): No     Lack of Transportation (Non-Medical): No   Physical Activity: Not on file   Stress: Not on file   Social Connections: Not on file   Intimate Partner Violence: Not on file   Housing Stability: Low Risk  (3/22/2024)    Housing Stability Vital Sign     Unable to Pay for Housing in the Last Year: No     Number of Times Moved in the Last Year: 1     Homeless in the Last Year: No     Social History     Substance and Sexual Activity   Alcohol Use Not Currently       Social History     Substance and Sexual Activity   Drug Use Never     Social History     Tobacco Use   Smoking Status Former    Types: Cigarettes   Smokeless Tobacco Never     History reviewed. No pertinent family history.  ==  Hayden Sanford DO  PGY-6  CARDIOLOGY FELLOW      ATTENDING ADDENDUM: I Have seen and examined the patient, reviewed the data and results above and agree with and have help formulated the plan of care above.     Patient with 2 episodes of rapid afib requiring hospitalization and DCCV, each lasting 2-3 days. He is morbidly obese and likely BRITTON.  ON Eliquis and  metoprololol now.    Recommend afib ablation, weight loss, BRITTON treatment.    Recommend Atrial fibrillation and/or flutter ablation.  Additional arrhythmias may also be targeted. Transesophageal echocardiogram maybe used to rule out thrombus. Risks and benefits discussed with patient and family. Explained risk of rare but serious complications like heart perforation, stroke, heart attack, kidney injury. Energy source for ablation my vary including radiofrequency, pulsed field (PFA), or cryoablation. PFA ablation does not have known esophageal injury risk so general safer from this life threatening complication. Risks of phrenic nerve (diaphragm) and lung injury also is less with this energy source.    Explained ablation for atrial fibrillation is treatment not a cure, about 20% of patient will opt for second ablation. We went over usually recovery and expectations of going through procedure.       He wants to think about it and will call if wants to pursue treatment.

## 2025-04-22 DIAGNOSIS — I48.0 PAROXYSMAL ATRIAL FIBRILLATION (HCC): ICD-10-CM

## 2025-04-22 NOTE — TELEPHONE ENCOUNTER
Reason for call:   [x] Refill   [] Prior Auth  [] Other:     Office:   [] PCP/Provider -   [x] Specialty/Provider - JANINE Vallecillo /  [facility]     Medication: apixaban (ELIQUIS) 5 mg / Take 1 tablet (5 mg total) by mouth 2 (two) times a day    Pharmacy: [id number] - [place] - [address] [place] [address] Pharmacy   Does the patient have enough for 3 days?   [x] Yes   [] No - Send as HP to POD

## 2025-06-03 ENCOUNTER — APPOINTMENT (EMERGENCY)
Dept: RADIOLOGY | Facility: HOSPITAL | Age: 45
End: 2025-06-03
Payer: COMMERCIAL

## 2025-06-03 ENCOUNTER — HOSPITAL ENCOUNTER (EMERGENCY)
Facility: HOSPITAL | Age: 45
Discharge: HOME | End: 2025-06-03
Attending: EMERGENCY MEDICINE
Payer: COMMERCIAL

## 2025-06-03 VITALS
OXYGEN SATURATION: 92 % | DIASTOLIC BLOOD PRESSURE: 60 MMHG | SYSTOLIC BLOOD PRESSURE: 132 MMHG | HEIGHT: 71 IN | TEMPERATURE: 98.5 F | RESPIRATION RATE: 18 BRPM | HEART RATE: 68 BPM | WEIGHT: 315 LBS | BODY MASS INDEX: 44.1 KG/M2

## 2025-06-03 DIAGNOSIS — R06.02 SOB (SHORTNESS OF BREATH): Primary | ICD-10-CM

## 2025-06-03 LAB
ALBUMIN SERPL BCG-MCNC: 4.5 G/DL (ref 3.5–5)
ALP SERPL-CCNC: 39 U/L (ref 34–104)
ALT SERPL W P-5'-P-CCNC: 15 U/L (ref 7–52)
ANION GAP SERPL CALCULATED.3IONS-SCNC: 7 MMOL/L (ref 4–13)
AST SERPL W P-5'-P-CCNC: 24 U/L (ref 13–39)
BASOPHILS # BLD AUTO: 0.06 THOUSANDS/ÂΜL (ref 0–0.1)
BASOPHILS NFR BLD AUTO: 1 % (ref 0–1)
BILIRUB SERPL-MCNC: 0.41 MG/DL (ref 0.2–1)
BNP SERPL-MCNC: 57 PG/ML (ref 0–100)
BUN SERPL-MCNC: 16 MG/DL (ref 5–25)
CALCIUM SERPL-MCNC: 9.6 MG/DL (ref 8.4–10.2)
CARDIAC TROPONIN I PNL SERPL HS: <2 NG/L (ref ?–50)
CHLORIDE SERPL-SCNC: 101 MMOL/L (ref 96–108)
CO2 SERPL-SCNC: 30 MMOL/L (ref 21–32)
CREAT SERPL-MCNC: 0.73 MG/DL (ref 0.6–1.3)
D DIMER PPP FEU-MCNC: <0.27 UG/ML FEU
EOSINOPHIL # BLD AUTO: 0.1 THOUSAND/ÂΜL (ref 0–0.61)
EOSINOPHIL NFR BLD AUTO: 1 % (ref 0–6)
ERYTHROCYTE [DISTWIDTH] IN BLOOD BY AUTOMATED COUNT: 13.3 % (ref 11.6–15.1)
GFR SERPL CREATININE-BSD FRML MDRD: 111 ML/MIN/1.73SQ M
GLUCOSE SERPL-MCNC: 102 MG/DL (ref 65–140)
HCT VFR BLD AUTO: 42.6 % (ref 36.5–49.3)
HGB BLD-MCNC: 13.9 G/DL (ref 12–17)
IMM GRANULOCYTES # BLD AUTO: 0.03 THOUSAND/UL (ref 0–0.2)
IMM GRANULOCYTES NFR BLD AUTO: 0 % (ref 0–2)
LYMPHOCYTES # BLD AUTO: 1.46 THOUSANDS/ÂΜL (ref 0.6–4.47)
LYMPHOCYTES NFR BLD AUTO: 15 % (ref 14–44)
MCH RBC QN AUTO: 27.9 PG (ref 26.8–34.3)
MCHC RBC AUTO-ENTMCNC: 32.6 G/DL (ref 31.4–37.4)
MCV RBC AUTO: 85 FL (ref 82–98)
MONOCYTES # BLD AUTO: 0.96 THOUSAND/ÂΜL (ref 0.17–1.22)
MONOCYTES NFR BLD AUTO: 10 % (ref 4–12)
NEUTROPHILS # BLD AUTO: 7.44 THOUSANDS/ÂΜL (ref 1.85–7.62)
NEUTS SEG NFR BLD AUTO: 73 % (ref 43–75)
NRBC BLD AUTO-RTO: 0 /100 WBCS
PLATELET # BLD AUTO: 206 THOUSANDS/UL (ref 149–390)
PMV BLD AUTO: 9.7 FL (ref 8.9–12.7)
POTASSIUM SERPL-SCNC: 3.9 MMOL/L (ref 3.5–5.3)
PROT SERPL-MCNC: 7.9 G/DL (ref 6.4–8.4)
RBC # BLD AUTO: 4.99 MILLION/UL (ref 3.88–5.62)
SODIUM SERPL-SCNC: 138 MMOL/L (ref 135–147)
WBC # BLD AUTO: 10.05 THOUSAND/UL (ref 4.31–10.16)

## 2025-06-03 PROCEDURE — 99285 EMERGENCY DEPT VISIT HI MDM: CPT

## 2025-06-03 PROCEDURE — 85025 COMPLETE CBC W/AUTO DIFF WBC: CPT | Performed by: EMERGENCY MEDICINE

## 2025-06-03 PROCEDURE — 80053 COMPREHEN METABOLIC PANEL: CPT | Performed by: EMERGENCY MEDICINE

## 2025-06-03 PROCEDURE — 99285 EMERGENCY DEPT VISIT HI MDM: CPT | Performed by: EMERGENCY MEDICINE

## 2025-06-03 PROCEDURE — 93005 ELECTROCARDIOGRAM TRACING: CPT

## 2025-06-03 PROCEDURE — 83880 ASSAY OF NATRIURETIC PEPTIDE: CPT | Performed by: EMERGENCY MEDICINE

## 2025-06-03 PROCEDURE — 36415 COLL VENOUS BLD VENIPUNCTURE: CPT | Performed by: EMERGENCY MEDICINE

## 2025-06-03 PROCEDURE — 84484 ASSAY OF TROPONIN QUANT: CPT | Performed by: EMERGENCY MEDICINE

## 2025-06-03 PROCEDURE — 71046 X-RAY EXAM CHEST 2 VIEWS: CPT

## 2025-06-03 PROCEDURE — 85379 FIBRIN DEGRADATION QUANT: CPT | Performed by: EMERGENCY MEDICINE

## 2025-06-03 NOTE — ED PROVIDER NOTES
Time reflects when diagnosis was documented in both MDM as applicable and the Disposition within this note       Time User Action Codes Description Comment    6/3/2025  6:09 PM Ayush Lezama Add [R06.02] SOB (shortness of breath)           ED Disposition       ED Disposition   Discharge    Condition   Stable    Date/Time   Tue Bryan 3, 2025  6:09 PM    Comment   Maurilio Connors discharge to home/self care.                   Assessment & Plan       Medical Decision Making  This is a 44-year-old male presents to the emergency department complaining of shortness of breath.  I considered PE, ACS, pneumonia, pneumothorax, CHF. These and other diagnoses were considered.         Amount and/or Complexity of Data Reviewed  Labs: ordered.  Radiology: ordered.        ED Course as of 06/03/25 1922 Tue Jun 03, 2025   1700 The patient had a chest x-ray that was independently interpreted by me that shows no pneumonia or pneumothorax.  As per radiology the patient has mild pulmonary congestion.   1918 The patient had lab work that was significant for a white count of 10, creatinine of 0.73, and a D-dimer of less than 0.27.  He had a troponin of less than 2 with a nonischemic EKG.  I doubt ACS.  The patient had a BNP of 57 with a chest x-ray that shows mild pulmonary congestion compared to his prior chest x-ray which showed pulmonary congestion.  I do not feel the patient is in a acute CHF exacerbation.  At this time he is satting 98% on room air and is not tachycardic.  He is not tachypneic.  He is not febrile.  The patient will be discharged with follow-up to his primary care physician.       Medications - No data to display    ED Risk Strat Scores                    No data recorded        SBIRT 20yo+      Flowsheet Row Most Recent Value   Initial Alcohol Screen: US AUDIT-C     1. How often do you have a drink containing alcohol? 1 Filed at: 06/03/2025 9391   2. How many drinks containing alcohol do you have on a typical day  "you are drinking?  0 Filed at: 06/03/2025 1546   Audit-C Score 1 Filed at: 06/03/2025 4548   TOBI: How many times in the past year have you...    Used an illegal drug or used a prescription medication for non-medical reasons? Never Filed at: 06/03/2025 1543                            History of Present Illness       Chief Complaint   Patient presents with    Shortness of Breath     Arrives via EMS with report of some shortness of breath (feels like the \"air is thin\") and mild chest pain overnight last night -- feels better now but unsure if his symptoms are related to possible sleep apnea, possible a-fib, or stress/anxiety.       Past Medical History[1]   Past Surgical History[2]   Family History[3]   Social History[4]   E-Cigarette/Vaping    E-Cigarette Use Never User       E-Cigarette/Vaping Substances    Nicotine No     THC No     CBD No     Flavoring No       I have reviewed and agree with the history as documented.     This is a 44-year-old male who presents to the emergency department complaining of shortness of breath.  The patient states over the last few days he has had shortness of breath, palpitations, and anxiety.  He states that he feels that the area is very thin.  He states he feels like his heart is palpitating.  He feels like he was in A-fib.  He still has the same sensations at this time.  He denies nausea or vomiting.  He denies fevers or chills.  He has had no cough.        Review of Systems   All other systems reviewed and are negative.          Objective       ED Triage Vitals [06/03/25 1543]   Temperature Pulse Blood Pressure Respirations SpO2 Patient Position - Orthostatic VS   98.5 °F (36.9 °C) 68 132/60 18 92 % Lying      Temp src Heart Rate Source BP Location FiO2 (%) Pain Score    -- Monitor Left arm -- No Pain      Vitals      Date and Time Temp Pulse SpO2 Resp BP Pain Score FACES Pain Rating User   06/03/25 1543 98.5 °F (36.9 °C) 68 92 % 18 132/60 No Pain -- KLB            Physical " Exam  Constitutional:  Vital signs reviewed, patient appears nontoxic, no acute distress  Eyes: Pupils equal round reactive to light and accommodation, extraocular muscles intact  HEENT: trachea midline, no JVD, moist mucous membranes  Respiratory: lung sounds clear throughout, no rhonchi, no rales  Cardiovascular: regular rate rhythm, no murmurs or rubs  Abdomen: soft, nontender, nondistended, no rebound or guarding  Back: no CVA tenderness, normal inspection  Extremities: Bilateral 1+ lower extremity pitting edema, pulses equal in all 4 extremities  Neuro: awake, alert, oriented, no focal weakness  Skin: warm, dry, intact, no rashes noted    Results Reviewed       Procedure Component Value Units Date/Time    B-Type Natriuretic Peptide(BNP) [[id number]]  (Normal) Collected: 06/03/25 1707    Lab Status: Final result Specimen: Blood from Arm, Right Updated: 06/03/25 1736     BNP 57 pg/mL     D-Dimer [[id number]]  (Normal) Collected: 06/03/25 1707    Lab Status: Final result Specimen: Blood from Arm, Right Updated: 06/03/25 1724     D-Dimer, Quant <0.27 ug/ml FEU     CBC and differential [[id number]] Collected: 06/03/25 1707    Lab Status: Final result Specimen: Blood from Arm, Right Updated: 06/03/25 1711     WBC 10.05 Thousand/uL      RBC 4.99 Million/uL      Hemoglobin 13.9 g/dL      Hematocrit 42.6 %      MCV 85 fL      MCH 27.9 pg      MCHC 32.6 g/dL      RDW 13.3 %      MPV 9.7 fL      Platelets 206 Thousands/uL      nRBC 0 /100 WBCs      Segmented % 73 %      Immature Grans % 0 %      Lymphocytes % 15 %      Monocytes % 10 %      Eosinophils Relative 1 %      Basophils Relative 1 %      Absolute Neutrophils 7.44 Thousands/µL      Absolute Immature Grans 0.03 Thousand/uL      Absolute Lymphocytes 1.46 Thousands/µL      Absolute Monocytes 0.96 Thousand/µL      Eosinophils Absolute 0.10 Thousand/µL      Basophils Absolute 0.06 Thousands/µL     HS Troponin 0hr (reflex protocol) [[id number]]  (Normal) Collected:  06/03/25 1636    Lab Status: Final result Specimen: Blood from Arm, Right Updated: 06/03/25 1702     hs TnI 0hr <2 ng/L     Comprehensive metabolic panel [[id number]] Collected: 06/03/25 1636    Lab Status: Final result Specimen: Blood from Arm, Right Updated: 06/03/25 1700     Sodium 138 mmol/L      Potassium 3.9 mmol/L      Chloride 101 mmol/L      CO2 30 mmol/L      ANION GAP 7 mmol/L      BUN 16 mg/dL      Creatinine 0.73 mg/dL      Glucose 102 mg/dL      Calcium 9.6 mg/dL      AST 24 U/L      ALT 15 U/L      Alkaline Phosphatase 39 U/L      Total Protein 7.9 g/dL      Albumin 4.5 g/dL      Total Bilirubin 0.41 mg/dL      eGFR 111 ml/min/1.73sq m     Narrative:      National Kidney Disease Foundation guidelines for Chronic Kidney Disease (CKD):     Stage 1 with normal or high GFR (GFR > 90 mL/min/1.73 square meters)    Stage 2 Mild CKD (GFR = 60-89 mL/min/1.73 square meters)    Stage 3A Moderate CKD (GFR = 45-59 mL/min/1.73 square meters)    Stage 3B Moderate CKD (GFR = 30-44 mL/min/1.73 square meters)    Stage 4 Severe CKD (GFR = 15-29 mL/min/1.73 square meters)    Stage 5 End Stage CKD (GFR <15 mL/min/1.73 square meters)  Note: GFR calculation is accurate only with a steady state creatinine            XR chest 2 views   Final Interpretation by Naeem Ulloa MD (06/03 1652)      Similar to prior study, there is mild pulmonary vascular prominence suggesting mild vascular congestion.            Workstation performed: [id number]             ECG 12 Lead Documentation Only    Date/Time: 6/3/2025 7:19 PM    Performed by: Ayush Lezama DO  Authorized by: Ayush Lezama DO    ECG reviewed by me, the ED Provider: yes    Patient location:  ED  Comments:      Normal sinus rhythm, rate of 81, normal MN, normal QTc, no STEMI, acute change compared to EKG dated November 25, 2024 with A-fib, EKG independently interpreted by me      ED Medication and Procedure Management   Prior to Admission Medications   Prescriptions  Last Dose Informant Patient Reported? Taking?   acetaminophen (TYLENOL) 325 mg tablet  Self No No   Sig: Take 2 tablets (650 mg total) by mouth every 6 (six) hours as needed for mild pain   apixaban (ELIQUIS) 5 mg   No No   Sig: Take 1 tablet (5 mg total) by mouth 2 (two) times a day   docusate sodium (COLACE) 100 mg capsule  Self No No   Sig: Take 1 capsule (100 mg total) by mouth as needed for constipation   Patient not taking: Reported on 12/20/2024   metoprolol succinate (TOPROL-XL) 25 mg 24 hr tablet  Self No No   Sig: Take 5 tablets (125 mg total) by mouth daily   omeprazole (PriLOSEC) 20 mg delayed release capsule  Self No No   Sig: Take 1 capsule (20 mg total) by mouth daily      Facility-Administered Medications: None     Discharge Medication List as of 6/3/2025  6:09 PM        CONTINUE these medications which have NOT CHANGED    Details   acetaminophen (TYLENOL) 325 mg tablet Take 2 tablets (650 mg total) by mouth every 6 (six) hours as needed for mild pain, Starting Fri 12/31/2021, No Print      apixaban (ELIQUIS) 5 mg Take 1 tablet (5 mg total) by mouth 2 (two) times a day, Starting Tue 4/22/2025, Until Sun 10/19/2025, Normal      docusate sodium (COLACE) 100 mg capsule Take 1 capsule (100 mg total) by mouth as needed for constipation, Starting Fri 3/22/2024, No Print      metoprolol succinate (TOPROL-XL) 25 mg 24 hr tablet Take 5 tablets (125 mg total) by mouth daily, Starting Sat 11/30/2024, Normal      omeprazole (PriLOSEC) 20 mg delayed release capsule Take 1 capsule (20 mg total) by mouth daily, Starting Thu 5/31/2018, Normal           No discharge procedures on file.  ED SEPSIS DOCUMENTATION   Time reflects when diagnosis was documented in both MDM as applicable and the Disposition within this note       Time User Action Codes Description Comment    6/3/2025  6:09 PM Ayush Lezama Add [R06.02] SOB (shortness of breath)                    [1]   Past Medical History:  Diagnosis Date    ADILSON (acute  kidney injury) (HCC)     Anxiety     Atrial fibrillation (HCC)     GERD (gastroesophageal reflux disease)     Hypertension     Kidney stone     Murmur, cardiac     Stress incontinence     Urethral stricture     Use of cane as ambulatory aid     as needed    Wears glasses    [2]   Past Surgical History:  Procedure Laterality Date    CARDIAC CATHETERIZATION Left 11/29/2024    Procedure: Cardiac Left Heart Cath;  Surgeon: Oskar Dsouza DO;  [facility];  Service: Cardiology    CHOLECYSTECTOMY      2004    CYSTOSCOPY N/A 10/19/2021    Procedure: CYSTOSCOPY;  Surgeon: Pilo Horton MD;  [facility];  Service: Urology    KS CYSTO/URETERO W/LITHOTRIPSY &INDWELL STENT INSRT Left 1/14/2022    Procedure: CYSTOSCOPY, left URETEROSCOPY, removal of migrated stent, insertion new left ureteral stent placement ;  Surgeon: Ayush Holland MD;  [facility];  Service: Urology    KS CYSTO/URETERO W/LITHOTRIPSY &INDWELL STENT INSRT Left 2/23/2022    Procedure: CYSTOSCOPY, URETEROSCOPY, BASKET EXTRACTION, STENT EXCHANGE LEFT URETER;  Surgeon: Ayush Holland MD;  [facility];  Service: Urology    KS CYSTOURETHROSCOPY W/INTERNAL URETHROTOMY N/A 10/19/2021    Procedure: DIRECT VISUAL INTERAL URETHROTOMY (DVIU);  Surgeon: Pilo Horton MD;  [facility];  Service: Urology    KS CYSTOURETHROSCOPY W/URETERAL CATHETERIZATION Left 12/29/2021    Procedure: CYSTOSCOPY RETROGRADE PYELOGRAM WITH INSERTION STENT URETERAL LEFT, DVIU;  Surgeon: Ayush Holland MD;  [facility];  Service: Urology   [3] No family history on file.  [4]   Social History  Tobacco Use    Smoking status: Former     Types: Cigarettes    Smokeless tobacco: Never   Vaping Use    Vaping status: Never Used   Substance Use Topics    Alcohol use: Not Currently    Drug use: Never        Ayush Lezama DO  06/03/25 1922

## 2025-06-04 LAB
ATRIAL RATE: 81 BPM
P AXIS: 39 DEGREES
PR INTERVAL: 158 MS
QRS AXIS: 39 DEGREES
QRSD INTERVAL: 94 MS
QT INTERVAL: 366 MS
QTC INTERVAL: 425 MS
T WAVE AXIS: 28 DEGREES
VENTRICULAR RATE: 81 BPM

## 2025-06-04 PROCEDURE — 93010 ELECTROCARDIOGRAM REPORT: CPT | Performed by: INTERNAL MEDICINE

## 2025-07-02 ENCOUNTER — OFFICE VISIT (OUTPATIENT)
Dept: SLEEP CENTER | Facility: CLINIC | Age: 45
End: 2025-07-02
Payer: COMMERCIAL

## 2025-07-02 VITALS
SYSTOLIC BLOOD PRESSURE: 132 MMHG | HEIGHT: 71 IN | BODY MASS INDEX: 44.1 KG/M2 | DIASTOLIC BLOOD PRESSURE: 64 MMHG | WEIGHT: 315 LBS

## 2025-07-02 DIAGNOSIS — R79.81 ELEVATED CO2 LEVEL: ICD-10-CM

## 2025-07-02 DIAGNOSIS — G47.34 NOCTURNAL HYPOXIA: ICD-10-CM

## 2025-07-02 DIAGNOSIS — I10 PRIMARY HYPERTENSION: ICD-10-CM

## 2025-07-02 DIAGNOSIS — I48.91 NEW ONSET ATRIAL FIBRILLATION (HCC): ICD-10-CM

## 2025-07-02 DIAGNOSIS — G47.33 OSA (OBSTRUCTIVE SLEEP APNEA): Primary | ICD-10-CM

## 2025-07-02 DIAGNOSIS — E66.01 MORBID OBESITY (HCC): ICD-10-CM

## 2025-07-02 DIAGNOSIS — K21.9 GASTROESOPHAGEAL REFLUX DISEASE, UNSPECIFIED WHETHER ESOPHAGITIS PRESENT: ICD-10-CM

## 2025-07-02 PROCEDURE — 99244 OFF/OP CNSLTJ NEW/EST MOD 40: CPT | Performed by: INTERNAL MEDICINE

## 2025-07-02 NOTE — PROGRESS NOTES
Name: Maurilio Connors      : 1980      MRN: [id number]  Encounter Provider: Jonatan Hargrove MD  Encounter Date: 2025   Encounter department: [facility] [place]  :  Assessment & Plan  BRITTON (obstructive sleep apnea)    Orders:    Split Night Sleep Study; Future    Nocturnal hypoxia         New onset atrial fibrillation (HCC)    Orders:    Ambulatory Referral to Sleep Medicine    Primary hypertension         Gastroesophageal reflux disease, unspecified whether esophagitis present         Morbid obesity (HCC)         Elevated CO2 level                PLAN:   Problems & Comorbidities Addressed this Visit as listed.  Stable/controlled conditions reviewed in notes.  With respect to above conditions, comprehensive counseling provided on pathophysiology; effects on symptoms and comorbidities, diagnostic strategies & limitations; treatment options; risks or no treament; risks & benefits of the various therapeutic options; costs and insurance aspects.     I advised weight reduction, avoiding sleeping supine, using alcohol or sedating medications close to bed time and on safe driving practices.    Further evaluation is indicated and a sleep study will be scheduled.  Patient understands limitations of home sleep testing and in lab study may be necessary.  Patient is willing to try Positive airway pressure therapy and will be scheduled for a titration study if indicated.  He may benefit from physical therapy for torticollis/decreased range of movement of his neck.  Follow-up to be scheduled after testing/initiation of therapy to review results, further details of treatment options and to adjust therapy.    History of Present Illness   HPI           Consultation - Sleep Center   Maurilio Connors  44 y.o. male  :1980  MRN:[id number]  DOS:2025    Physician Requesting Consult: Sánchez Arndt MD             Reason for Consult : At your kind request I saw Maurilio Connors for initial sleep  evaluation today. The patient is here to evaluate for suspected Obstructive Sleep Apnea.      PFSH, Problem List, Medications & Allergies were reviewed in EMR.    Maurilio  has a past medical history of ADILSON (acute kidney injury) (HCC), Anxiety, Atrial fibrillation (HCC), GERD (gastroesophageal reflux disease), Hypertension, Kidney stone, Murmur, cardiac, Stress incontinence, Urethral stricture, Use of cane as ambulatory aid, and Wears glasses.      He has a current medication list which includes the following prescription(s): acetaminophen, apixaban, metoprolol succinate, omeprazole, and docusate sodium.      HPI: He is here at behest of his cardiologist for new onset atrial fibrillation arising out of sleep a year ago.  Had a recurrence in November for which he required cardioversion.  During that hospitalization, was also noted to be having oxygen desaturations during sleep.  He sleeps alone and is not aware of snoring or breathing difficulties during sleep.  Other complaints: None. Restless Leg Syndrome: Reports no suggestive symptoms.  .  Parasomnia: No features reported    Sleep Routine (averaged): Typical Bedtime: 9 PM.  Gets OOB: 4:30 AM. TIB:7.5 hrs.   Sleep latency:<  45 minutes; Sleep Interruptions: 2-, because of nocturia and at times okay struggles to fall back asleep. Awakens: Spontaneously  Upon awakening: is not always refreshed.  He estimates getting  hrs sleep.  Daytime Function:Maurilio denies excessive daytime sleepiness. He rated himself at Total score: 3 /24 on the [place] Sleepiness Scale.     Habits:   reports that he has quit smoking. His smoking use included cigarettes. He has never used smokeless tobacco.;  reports that he does not currently use alcohol.; Reports no history of drug use.;  E-Cigarette/Vaping  Never User; Caffeine use:none until  ; Exercise routine: none.    Occupation:   [employer] License:    Family History: Negative for sleep disturbance.  ROS: Significant for weight has been  "stable..  Acid reflux has been controlled.  Presently is reporting no nasal, respiratory or cardiac symptoms.    EXAM:  /64 (BP Location: Left arm, Patient Position: Sitting, Cuff Size: Large)   Ht 5' 11\" (1.803 m)   Wt (!) 167 kg (369 lb)   BMI 51.47 kg/m²    General: Well groomed male, well appearing, in no apparent distress.  Limited range of motion of his neck and appears to be in fixed flexion  Neurological: Alert and orientated; cooperative; Cranial nerves intact;    Psychiatric: Speech: Clear and coherent; normal mood, affect & thought   Skin: Warm and dry; Color& Hydration good; no facial rashes or lesions   HEENT:  Craniofacial anatomy: normal Sinuses: Non-tender. TMJ: Normal    Eyes: EOM's intact; conjunctiva/corneas clear   Ears: Appear normal     Nasal Airway: is patent Septum: Intact; Turbinates: Normal; Rhinorrhea: None  Mouth: Lips: Normal posture; Dentition: normal . Mucosa: Moist; Hard Palate:normal    Oropharryx: crowded and AP narrowing Tongue: Mallampati:Class IV, Mobile, and MacroglossiaSoft Palate:  redundant  Tonsils: absent  Neck: Is thick and excess fatty tissue; neck Circumference: 17 \"; [no abnormal masses; Supple; no abnormal masses; Thyroid: Normal. Trachea: Central.    Heart: S1,S2 normal; RRR; no gallop; no murmur   Lungs: Respiratory Effort: Normal. Air entry good bilaterally.  No wheezes.  No rales  Abdomen: Obese, soft.   Extremities: No pedal edema.  No clubbing or cyanosis.    Musculoskeletal:  Motor normal; Gait: Normal.       Lab Results   Component Value Date     05/01/2017    SODIUM 138 06/03/2025    K 3.9 06/03/2025     06/03/2025    CO2 30 06/03/2025    AGAP 7 06/03/2025    BUN 16 06/03/2025    CREATININE 0.73 06/03/2025    GLUC 102 06/03/2025    GLUF 99 12/29/2021    CALCIUM 9.6 06/03/2025    AST 24 06/03/2025    ALT 15 06/03/2025    ALKPHOS 39 06/03/2025    PROT 7.7 05/01/2017    TP 7.9 06/03/2025    BILITOT 0.5 05/01/2017    TBILI 0.41 06/03/2025    " "EGFR 111 06/03/2025   ;   Lab Results   Component Value Date    WBC 10.05 06/03/2025    HGB 13.9 06/03/2025    HCT 42.6 06/03/2025    MCV 85 06/03/2025     06/03/2025   : @SOO@    Sincerely,      Authenticated electronically on 07/02/25   Board Certified Specialist     Portions of the record may have been created with voice recognition software. Occasional wrong word or \"sound a like\" substitutions may have occurred due to the inherent limitations of voice recognition software. There may also be notations and random deletions of words or characters from malfunctioning software. Read the chart carefully and recognize, using context, where substitutions/deletions have occurred.          Review of Systems           "

## 2025-07-02 NOTE — PATIENT INSTRUCTIONS
What is BRITTON?   Obstructive sleep apnea is a common and serious sleep disorder that causes you to stop breathing during sleep. The airway repeatedly becomes blocked, limiting the amount of air that reaches your lungs. When this happens, you may snore loudly or making choking noises as you try to breathe. Your brain and body becomes oxygen deprived and you may wake up. This may happen a few times a night, or in more severe cases, several hundred times a night.   Sleep apnea can make you wake up in the morning feeling tired or unrefreshed even though you have had a full night of sleep. During the day, you may feel fatigued, have difficulty concentrating or you may even unintentionally fall asleep. This is because your body is waking up numerous times throughout the night, even though you might not be conscious of each awakening.  The lack of oxygen your body receives can have negative long-term consequences for your health. This includes:  High blood pressure  Heart disease  Irregular heart rhythms  Stroke  Pre-diabetes and diabetes  Depression  Testing  An objective evaluation of your sleep may be needed before your board certified sleep physician can make a diagnosis. Options include:   In-lab overnight sleep study  This type of sleep study requires you to stay overnight at a sleep center, in a bed that may resemble a hotel room. You will sleep with sensors hooked up to various parts of your body. These sensors record your brain waves, heartbeat, breathing and movement. An overnight sleep study also provides your doctor with the most complete information about your sleep. Learn more about an overnight sleep study.   Home sleep apnea test  Some patients with high risk factors for obstructive sleep apnea and no other medical disorders may be candidates for a home sleep apnea test. The testing equipment differs in that it is less complicated than what is used in an overnight sleep study. As such, does not give all the  data an in-lab will and if negative, may not mean you do not have the problem.  Treatment for sleep apnea includes using a continuous positive airway pressure (CPAP) machine to keep your airway open during sleep. A mask is placed over your nose and mouth, or just your nose. The mask is hooked to the CPAP machine that blows a gentle stream of air into the mask when you breathe. This helps keep your airway open so you can breathe more regularly. Extra oxygen may be given to you through the machine. You may be given a mouth device. It looks like a mouth guard or dental retainer and stops your tongue and mouth tissues from blocking your throat while you sleep. Surgery may be needed to remove extra tissues that block your mouth, throat, or nose.  Manage sleep apnea:   Do not smoke. Nicotine and other chemicals in cigarettes and cigars can cause lung damage. Ask your healthcare provider for information if you currently smoke and need help to quit. E-cigarettes or smokeless tobacco still contain nicotine. Talk to your healthcare provider before you use these products.  Do not drink alcohol or take sedative medicine before you go to sleep. Alcohol and sedatives can relax the muscles and tissues around your throat. This can block the airflow to your lungs.  Maintain a healthy weight. Excess tissue around your throat may restrict your breathing. Ask your healthcare provider for information if you need to lose weight.  Sleep on your side or use pillows designed to prevent sleep apnea. This prevents your tongue or other tissues from blocking your throat. You can also raise the head of your bed.  Driving Safety. Refrain from driving when drowsy.   Follow up with your healthcare provider as directed: Write down your questions so you remember to ask them during your visits.  Go to AASM website for more information: Sleepeducation.org  What is BRITTON?   Obstructive sleep apnea is a common and serious sleep disorder that causes you to  stop breathing during sleep. The airway repeatedly becomes blocked, limiting the amount of air that reaches your lungs. When this happens, you may snore loudly or making choking noises as you try to breathe. Your brain and body becomes oxygen deprived and you may wake up. This may happen a few times a night, or in more severe cases, several hundred times a night.   Sleep apnea can make you wake up in the morning feeling tired or unrefreshed even though you have had a full night of sleep. During the day, you may feel fatigued, have difficulty concentrating or you may even unintentionally fall asleep. This is because your body is waking up numerous times throughout the night, even though you might not be conscious of each awakening.  The lack of oxygen your body receives can have negative long-term consequences for your health. This includes:  High blood pressure  Heart disease  Irregular heart rhythms  Stroke  Pre-diabetes and diabetes  Depression  Testing  An objective evaluation of your sleep may be needed before your board certified sleep physician can make a diagnosis. Options include:   In-lab overnight sleep study  This type of sleep study requires you to stay overnight at a sleep center, in a bed that may resemble a hotel room. You will sleep with sensors hooked up to various parts of your body. These sensors record your brain waves, heartbeat, breathing and movement. An overnight sleep study also provides your doctor with the most complete information about your sleep. Learn more about an overnight sleep study.   Home sleep apnea test  Some patients with high risk factors for obstructive sleep apnea and no other medical disorders may be candidates for a home sleep apnea test. The testing equipment differs in that it is less complicated than what is used in an overnight sleep study. As such, does not give all the data an in-lab will and if negative, may not mean you do not have the problem.  Treatment for  sleep apnea includes using a continuous positive airway pressure (CPAP) machine to keep your airway open during sleep. A mask is placed over your nose and mouth, or just your nose. The mask is hooked to the CPAP machine that blows a gentle stream of air into the mask when you breathe. This helps keep your airway open so you can breathe more regularly. Extra oxygen may be given to you through the machine. You may be given a mouth device. It looks like a mouth guard or dental retainer and stops your tongue and mouth tissues from blocking your throat while you sleep. Surgery may be needed to remove extra tissues that block your mouth, throat, or nose.  Manage sleep apnea:   Do not smoke. Nicotine and other chemicals in cigarettes and cigars can cause lung damage. Ask your healthcare provider for information if you currently smoke and need help to quit. E-cigarettes or smokeless tobacco still contain nicotine. Talk to your healthcare provider before you use these products.  Do not drink alcohol or take sedative medicine before you go to sleep. Alcohol and sedatives can relax the muscles and tissues around your throat. This can block the airflow to your lungs.  Maintain a healthy weight. Excess tissue around your throat may restrict your breathing. Ask your healthcare provider for information if you need to lose weight.  Sleep on your side or use pillows designed to prevent sleep apnea. This prevents your tongue or other tissues from blocking your throat. You can also raise the head of your bed.  Driving Safety. Refrain from driving when drowsy.   Follow up with your healthcare provider as directed: Write down your questions so you remember to ask them during your visits.  Go to AASM website for more information: Sleepeducation.org    Nursing Support:  When: Monday through Friday 7:30A-4:30PM except holidays  Where: Our direct line is [phone]  *3  *1.      If you are having a true emergency please call 911.  In the  event that the line is busy or it is after hours please leave a voice message and we will return your call.  Please speak clearly, leaving your full name, birth date, best number to reach you and the reason for your call.   Medication refills: We will need the name of the medication, the dosage, the ordering provider, whether you get a 30 or 90 day refill, and the pharmacy name and address.  Medications will be ordered by the provider only.  Nurses cannot call in prescriptions.  Please allow 7 days for medication refills.  Physician requested updates: If your provider requested that you call with an update after starting medication, please be ready to provide us the medication and dosage, what time you take your medication, the time you attempt to fall asleep, time you fall asleep, when you wake up, and what time you get out of bed.  Sleep Study Results: We will contact you with sleep study results and/or next steps after the physician has reviewed your testing.

## 2025-07-25 ENCOUNTER — HOSPITAL ENCOUNTER (EMERGENCY)
Facility: HOSPITAL | Age: 45
Discharge: HOME | End: 2025-07-25
Attending: EMERGENCY MEDICINE
Payer: COMMERCIAL

## 2025-07-25 ENCOUNTER — APPOINTMENT (EMERGENCY)
Dept: RADIOLOGY | Facility: HOSPITAL | Age: 45
End: 2025-07-25
Payer: COMMERCIAL

## 2025-07-25 VITALS
RESPIRATION RATE: 16 BRPM | HEART RATE: 76 BPM | OXYGEN SATURATION: 95 % | TEMPERATURE: 97.5 F | SYSTOLIC BLOOD PRESSURE: 124 MMHG | DIASTOLIC BLOOD PRESSURE: 56 MMHG

## 2025-07-25 DIAGNOSIS — R00.2 PALPITATIONS: Primary | ICD-10-CM

## 2025-07-25 LAB
ALBUMIN SERPL BCG-MCNC: 4.5 G/DL (ref 3.5–5)
ALP SERPL-CCNC: 39 U/L (ref 34–104)
ALT SERPL W P-5'-P-CCNC: 19 U/L (ref 7–52)
ANION GAP SERPL CALCULATED.3IONS-SCNC: 9 MMOL/L (ref 4–13)
AST SERPL W P-5'-P-CCNC: 20 U/L (ref 13–39)
ATRIAL RATE: 78 BPM
BASOPHILS # BLD AUTO: 0.06 THOUSANDS/ÂΜL (ref 0–0.1)
BASOPHILS NFR BLD AUTO: 1 % (ref 0–1)
BILIRUB SERPL-MCNC: 0.48 MG/DL (ref 0.2–1)
BUN SERPL-MCNC: 13 MG/DL (ref 5–25)
CALCIUM SERPL-MCNC: 9.5 MG/DL (ref 8.4–10.2)
CARDIAC TROPONIN I PNL SERPL HS: <2 NG/L (ref ?–50)
CHLORIDE SERPL-SCNC: 100 MMOL/L (ref 96–108)
CO2 SERPL-SCNC: 29 MMOL/L (ref 21–32)
CREAT SERPL-MCNC: 0.61 MG/DL (ref 0.6–1.3)
EOSINOPHIL # BLD AUTO: 0.12 THOUSAND/ÂΜL (ref 0–0.61)
EOSINOPHIL NFR BLD AUTO: 1 % (ref 0–6)
ERYTHROCYTE [DISTWIDTH] IN BLOOD BY AUTOMATED COUNT: 13.3 % (ref 11.6–15.1)
GFR SERPL CREATININE-BSD FRML MDRD: 119 ML/MIN/1.73SQ M
GLUCOSE SERPL-MCNC: 92 MG/DL (ref 65–140)
HCT VFR BLD AUTO: 44.9 % (ref 36.5–49.3)
HGB BLD-MCNC: 14.8 G/DL (ref 12–17)
IMM GRANULOCYTES # BLD AUTO: 0.05 THOUSAND/UL (ref 0–0.2)
IMM GRANULOCYTES NFR BLD AUTO: 0 % (ref 0–2)
LYMPHOCYTES # BLD AUTO: 1.5 THOUSANDS/ÂΜL (ref 0.6–4.47)
LYMPHOCYTES NFR BLD AUTO: 13 % (ref 14–44)
MAGNESIUM SERPL-MCNC: 2 MG/DL (ref 1.9–2.7)
MCH RBC QN AUTO: 28.5 PG (ref 26.8–34.3)
MCHC RBC AUTO-ENTMCNC: 33 G/DL (ref 31.4–37.4)
MCV RBC AUTO: 87 FL (ref 82–98)
MONOCYTES # BLD AUTO: 0.89 THOUSAND/ÂΜL (ref 0.17–1.22)
MONOCYTES NFR BLD AUTO: 8 % (ref 4–12)
NEUTROPHILS # BLD AUTO: 8.61 THOUSANDS/ÂΜL (ref 1.85–7.62)
NEUTS SEG NFR BLD AUTO: 77 % (ref 43–75)
NRBC BLD AUTO-RTO: 0 /100 WBCS
P AXIS: 33 DEGREES
PLATELET # BLD AUTO: 221 THOUSANDS/UL (ref 149–390)
PMV BLD AUTO: 9.6 FL (ref 8.9–12.7)
POTASSIUM SERPL-SCNC: 3.6 MMOL/L (ref 3.5–5.3)
PR INTERVAL: 150 MS
PROT SERPL-MCNC: 7.9 G/DL (ref 6.4–8.4)
QRS AXIS: 43 DEGREES
QRSD INTERVAL: 98 MS
QT INTERVAL: 364 MS
QTC INTERVAL: 414 MS
RBC # BLD AUTO: 5.19 MILLION/UL (ref 3.88–5.62)
SODIUM SERPL-SCNC: 138 MMOL/L (ref 135–147)
T WAVE AXIS: 22 DEGREES
TSH SERPL DL<=0.05 MIU/L-ACNC: 2.09 UIU/ML (ref 0.45–4.5)
VENTRICULAR RATE: 78 BPM
WBC # BLD AUTO: 11.23 THOUSAND/UL (ref 4.31–10.16)

## 2025-07-25 PROCEDURE — 71045 X-RAY EXAM CHEST 1 VIEW: CPT

## 2025-07-25 PROCEDURE — 99285 EMERGENCY DEPT VISIT HI MDM: CPT | Performed by: PHYSICIAN ASSISTANT

## 2025-07-25 PROCEDURE — 93005 ELECTROCARDIOGRAM TRACING: CPT

## 2025-07-25 PROCEDURE — 93010 ELECTROCARDIOGRAM REPORT: CPT | Performed by: INTERNAL MEDICINE

## 2025-07-25 PROCEDURE — 85025 COMPLETE CBC W/AUTO DIFF WBC: CPT | Performed by: PHYSICIAN ASSISTANT

## 2025-07-25 PROCEDURE — 84443 ASSAY THYROID STIM HORMONE: CPT | Performed by: PHYSICIAN ASSISTANT

## 2025-07-25 PROCEDURE — 83735 ASSAY OF MAGNESIUM: CPT | Performed by: PHYSICIAN ASSISTANT

## 2025-07-25 PROCEDURE — 99285 EMERGENCY DEPT VISIT HI MDM: CPT

## 2025-07-25 PROCEDURE — 36415 COLL VENOUS BLD VENIPUNCTURE: CPT | Performed by: PHYSICIAN ASSISTANT

## 2025-07-25 PROCEDURE — 84484 ASSAY OF TROPONIN QUANT: CPT | Performed by: PHYSICIAN ASSISTANT

## 2025-07-25 PROCEDURE — 80053 COMPREHEN METABOLIC PANEL: CPT | Performed by: PHYSICIAN ASSISTANT

## 2025-07-25 NOTE — ED NOTES
Pts mother, Brianna, [phone] called department requesting update on pt. Pt aware, gave permission to update her on plan of care. Requesting call back when results are back.      Maia Jama RN  07/25/25 [phone]

## 2025-07-25 NOTE — DISCHARGE INSTRUCTIONS
Please return to the emergency department for worsening symptoms including chest pain, shortness of breath, dizziness, lightheadedness, fever greater than 103, severe pain, inability to walk, fainting episodes, etc..  Please follow-up with your family practice provider as soon as possible.  I have sent medications over to the pharmacy for your symptoms.  Please take as directed.  Follow-up with the cardiologist.

## 2025-07-25 NOTE — ED NOTES
"Patient requested a Uber/Lyft ride home after discharge from the ED. \"Waiver and release for free local transportation program\" signed and filed in chart.      Harlan Mata RN  07/25/25 [id number]    "

## 2025-07-25 NOTE — ED PROVIDER NOTES
Time reflects when diagnosis was documented in both MDM as applicable and the Disposition within this note       Time User Action Codes Description Comment    7/25/2025  4:02 PM Jose Alejo Add [R00.2] Palpitations           ED Disposition       ED Disposition   Discharge    Condition   Stable    Date/Time   Fri Jul 25, 2025  4:02 PM    Comment   Maurilio Connors discharge to home/self care.                   Assessment & Plan       Medical Decision Making  44-year-old male presenting to the emergency department today for palpitations.  These have been intermittent for approximately 1 month.  The last for approximately 10 to 15 minutes at a time and are not associated with shortness of breath.  History of atrial fibrillation compliant on anticoagulant and beta-blockers.  Vitals are stable.  Afebrile.  Differential diagnosis includes but is not limited to dehydration, electrolyte abnormalities, thyroid dysfunction, atrial fibrillation, arrhythmia, etc.  EKG shows normal sinus rhythm with a rate of 78.  Chest x-ray is without acute cardiopulmonary disease on my independent interpretation.  Negative troponin x 1.  TSH is within normal limits.  Electrolytes appear optimized while here.  The patient has mild leukocytosis.  Telemetry strips without signs of arrhythmia at this time.  The patient is stable for discharge at this time.  Follow-up with cardiology.  He already sees a cardiologist but I gave him a new referral.  Referral placed for Holter monitor.  Return to the emergency department for worsening symptoms.  Strict return precautions were given.  Recommend PCP follow-up as soon as possible. The patient and/or patient's proxy verify their understanding and agree to the plan at this time.  All questions answered to the patient and/or their proxy's satisfaction.  All labs reviewed and utilized in the medical decision making process (if labs were ordered).  Portions of the record may have been created with  "voice recognition software.  Occasional wrong word or \"sound a like\" substitutions may have occurred due to the inherent limitations of voice recognition software.  Read the chart carefully and recognize, using context, where substitutions have occurred.    I reviewed prior notes.  I reviewed prior EKG.  I reviewed prior labs.    Problems Addressed:  Palpitations: undiagnosed new problem with uncertain prognosis    Amount and/or Complexity of Data Reviewed  External Data Reviewed: labs, ECG and notes.  Labs: ordered. Decision-making details documented in ED Course.  Radiology: ordered and independent interpretation performed. Decision-making details documented in ED Course.     Details: CXR  ECG/medicine tests: ordered and independent interpretation performed. Decision-making details documented in ED Course.             Medications - No data to display    ED Risk Strat Scores                    No data recorded                            History of Present Illness       Chief Complaint   Patient presents with    Palpitations     Patient arrives to the Ed with complaint of palpitation for the last month for an hour a day per patient. Pt report one episode of chest pain along with palpitation. Pt denies current CP, SOB       Past Medical History[1]   Past Surgical History[2]   Family History[3]   Social History[4]   E-Cigarette/Vaping    E-Cigarette Use Never User       E-Cigarette/Vaping Substances    Nicotine No     THC No     CBD No     Flavoring No       I have reviewed and agree with the history as documented.     This is a 44-year-old male with past medical history significant for atrial fibrillation presenting to the emergency department today for intermittent palpitations.  The patient notes for the past month he has intermittent palpitations that occur approximately once daily and last for approximately 10 to 15 minutes at a time.  These episodes are not associated with any chest pain or shortness of breath.  " He has no associated dizziness or lightheadedness.  Had a brief episode of chest pain yesterday the patient has been chest pain-free since then.  He denies any fevers or chills.  He has a history of atrial fibrillation and has been compliant with his anticoagulants and beta-blockers at home.  He denies any nausea or vomiting.  He has no diarrhea or constipation.  He has no abdominal pain.  He has no upper respiratory symptoms.  No other complaints at this time.      History provided by:  Patient   used: No    Palpitations  Onset quality:  Sudden  Duration: 10-15 minutes.  Timing:  Intermittent  Chronicity:  Recurrent  Relieved by:  None tried  Worsened by:  Nothing  Ineffective treatments:  None tried  Associated symptoms: no back pain, no chest pain, no chest pressure, no cough, no diaphoresis, no dizziness, no hemoptysis, no leg pain, no lower extremity edema, no malaise/fatigue, no nausea, no near-syncope, no numbness, no orthopnea, no shortness of breath, no syncope, no vomiting and no weakness    Risk factors: hx of atrial fibrillation        Review of Systems   Constitutional:  Negative for appetite change, chills, diaphoresis, fatigue, fever and malaise/fatigue.   Eyes:  Negative for visual disturbance.   Respiratory:  Negative for cough, hemoptysis, chest tightness, shortness of breath and wheezing.    Cardiovascular:  Positive for palpitations. Negative for chest pain, orthopnea, leg swelling, syncope and near-syncope.   Gastrointestinal:  Negative for abdominal pain, constipation, diarrhea, nausea and vomiting.   Musculoskeletal:  Negative for back pain, neck pain and neck stiffness.   Skin:  Negative for rash and wound.   Neurological:  Negative for dizziness, seizures, syncope, weakness, light-headedness, numbness and headaches.   Psychiatric/Behavioral:  Negative for confusion.    All other systems reviewed and are negative.          Objective       ED Triage Vitals   Temperature  Pulse Blood Pressure Respirations SpO2 Patient Position - Orthostatic VS   07/25/25 1510 07/25/25 1443 07/25/25 1443 07/25/25 1443 07/25/25 1443 07/25/25 1443   97.5 °F (36.4 °C) 78 140/63 20 97 % Lying      Temp Source Heart Rate Source BP Location FiO2 (%) Pain Score    07/25/25 1510 07/25/25 1443 07/25/25 1443 -- 07/25/25 1443    Tympanic Monitor Right arm  1      Vitals      Date and Time Temp Pulse SpO2 Resp BP Pain Score FACES Pain Rating User   07/25/25 1557 -- 76 95 % 16 124/56 1 -- KENDRA   07/25/25 1510 97.5 °F (36.4 °C) -- -- -- -- -- -- KENDRA   07/25/25 1443 -- 78 97 % 20 140/63 1 -- KENDRA            Physical Exam  Vitals and nursing note reviewed.   Constitutional:       General: He is not in acute distress.     Appearance: Normal appearance. He is obese. He is not ill-appearing, toxic-appearing or diaphoretic.   HENT:      Head: Normocephalic and atraumatic.      Nose: Nose normal. No congestion or rhinorrhea.      Mouth/Throat:      Mouth: Mucous membranes are moist.      Pharynx: No oropharyngeal exudate or posterior oropharyngeal erythema.     Eyes:      General: No scleral icterus.        Right eye: No discharge.         Left eye: No discharge.      Conjunctiva/sclera: Conjunctivae normal.       Cardiovascular:      Rate and Rhythm: Normal rate and regular rhythm.      Pulses: Normal pulses.      Heart sounds: Normal heart sounds. No murmur heard.     No friction rub. No gallop.   Pulmonary:      Effort: Pulmonary effort is normal. No respiratory distress.      Breath sounds: Normal breath sounds. No stridor. No wheezing, rhonchi or rales.   Chest:      Chest wall: No tenderness.   Abdominal:      General: Abdomen is flat. There is no distension.      Palpations: Abdomen is soft.      Tenderness: There is no abdominal tenderness. There is no right CVA tenderness, left CVA tenderness, guarding or rebound.     Musculoskeletal:         General: Normal range of motion.      Cervical back: Normal range of motion.  No rigidity.      Right lower leg: No edema.      Left lower leg: No edema.     Skin:     General: Skin is warm and dry.      Capillary Refill: Capillary refill takes less than 2 seconds.      Coloration: Skin is not jaundiced or pale.     Neurological:      General: No focal deficit present.      Mental Status: He is alert and oriented to person, place, and time. Mental status is at baseline.     Psychiatric:         Mood and Affect: Mood normal.         Behavior: Behavior normal.         Results Reviewed       Procedure Component Value Units Date/Time    TSH, 3rd generation with Free T4 reflex [[id number]]  (Normal) Collected: 07/25/25 1515    Lab Status: Final result Specimen: Blood from Arm, Right Updated: 07/25/25 1552     TSH 3RD GENERATION 2.092 uIU/mL     HS Troponin 0hr (reflex protocol) [[id number]]  (Normal) Collected: 07/25/25 1515    Lab Status: Final result Specimen: Blood from Arm, Right Updated: 07/25/25 1545     hs TnI 0hr <2 ng/L     Comprehensive metabolic panel [[id number]] Collected: 07/25/25 1515    Lab Status: Final result Specimen: Blood from Arm, Right Updated: 07/25/25 1542     Sodium 138 mmol/L      Potassium 3.6 mmol/L      Chloride 100 mmol/L      CO2 29 mmol/L      ANION GAP 9 mmol/L      BUN 13 mg/dL      Creatinine 0.61 mg/dL      Glucose 92 mg/dL      Calcium 9.5 mg/dL      AST 20 U/L      ALT 19 U/L      Alkaline Phosphatase 39 U/L      Total Protein 7.9 g/dL      Albumin 4.5 g/dL      Total Bilirubin 0.48 mg/dL      eGFR 119 ml/min/1.73sq m     Narrative:      [employer] guidelines for Chronic Kidney Disease (CKD):     Stage 1 with normal or high GFR (GFR > 90 mL/min/1.73 square meters)    Stage 2 Mild CKD (GFR = 60-89 mL/min/1.73 square meters)    Stage 3A Moderate CKD (GFR = 45-59 mL/min/1.73 square meters)    Stage 3B Moderate CKD (GFR = 30-44 mL/min/1.73 square meters)    Stage 4 Severe CKD (GFR = 15-29 mL/min/1.73 square meters)    Stage 5 End Stage CKD  (GFR <15 mL/min/1.73 square meters)  Note: GFR calculation is accurate only with a steady state creatinine    Magnesium [[id number]]  (Normal) Collected: 07/25/25 1515    Lab Status: Final result Specimen: Blood from Arm, Right Updated: 07/25/25 1542     Magnesium 2.0 mg/dL     CBC and differential [[id number]]  (Abnormal) Collected: 07/25/25 1515    Lab Status: Final result Specimen: Blood from Arm, Right Updated: 07/25/25 1520     WBC 11.23 Thousand/uL      RBC 5.19 Million/uL      Hemoglobin 14.8 g/dL      Hematocrit 44.9 %      MCV 87 fL      MCH 28.5 pg      MCHC 33.0 g/dL      RDW 13.3 %      MPV 9.6 fL      Platelets 221 Thousands/uL      nRBC 0 /100 WBCs      Segmented % 77 %      Immature Grans % 0 %      Lymphocytes % 13 %      Monocytes % 8 %      Eosinophils Relative 1 %      Basophils Relative 1 %      Absolute Neutrophils 8.61 Thousands/µL      Absolute Immature Grans 0.05 Thousand/uL      Absolute Lymphocytes 1.50 Thousands/µL      Absolute Monocytes 0.89 Thousand/µL      Eosinophils Absolute 0.12 Thousand/µL      Basophils Absolute 0.06 Thousands/µL             XR chest 1 view portable   Final Interpretation by Vinicio Garza MD (07/25 1528)      No acute cardiopulmonary disease.            Workstation performed: [id number]             ECG 12 Lead Documentation Only    Date/Time: 7/25/2025 2:43 PM    Performed by: Jose Alejo PA-C  Authorized by: Jose Aljeo PA-C    Indications / Diagnosis:  Palpitations  Previous ECG:     Previous ECG:  Compared to current    Comparison ECG info:  6/3/2025    Similarity:  No change  Interpretation:     Interpretation: non-specific    Rate:     ECG rate:  78    ECG rate assessment: normal    Rhythm:     Rhythm: sinus rhythm    Ectopy:     Ectopy: none    QRS:     QRS axis:  Normal  Conduction:     Conduction: normal    ST segments:     ST segments:  Normal  T waves:     T waves: non-specific    Comments:      Normal sinus  rhythm with a rate of 78.  Normal axis.  No ectopy.  No STEMI.  QTc 414.      ED Medication and Procedure Management   Prior to Admission Medications   Prescriptions Last Dose Informant Patient Reported? Taking?   acetaminophen (TYLENOL) 325 mg tablet 7/24/2025 Evening Self No Yes   Sig: Take 2 tablets (650 mg total) by mouth every 6 (six) hours as needed for mild pain   apixaban (ELIQUIS) 5 mg 7/25/2025 Morning  No Yes   Sig: Take 1 tablet (5 mg total) by mouth 2 (two) times a day   docusate sodium (COLACE) 100 mg capsule  Self No No   Sig: Take 1 capsule (100 mg total) by mouth as needed for constipation   Patient not taking: Reported on 12/20/2024   metoprolol succinate (TOPROL-XL) 25 mg 24 hr tablet 7/25/2025 Morning Self No Yes   Sig: Take 5 tablets (125 mg total) by mouth daily   omeprazole (PriLOSEC) 20 mg delayed release capsule 7/25/2025 Morning Self No Yes   Sig: Take 1 capsule (20 mg total) by mouth daily      Facility-Administered Medications: None     Discharge Medication List as of 7/25/2025  4:03 PM        CONTINUE these medications which have NOT CHANGED    Details   acetaminophen (TYLENOL) 325 mg tablet Take 2 tablets (650 mg total) by mouth every 6 (six) hours as needed for mild pain, Starting Fri 12/31/2021, No Print      apixaban (ELIQUIS) 5 mg Take 1 tablet (5 mg total) by mouth 2 (two) times a day, Starting Tue 4/22/2025, Until Sun 10/19/2025, Normal      metoprolol succinate (TOPROL-XL) 25 mg 24 hr tablet Take 5 tablets (125 mg total) by mouth daily, Starting Sat 11/30/2024, Normal      omeprazole (PriLOSEC) 20 mg delayed release capsule Take 1 capsule (20 mg total) by mouth daily, Starting Thu 5/31/2018, Normal      docusate sodium (COLACE) 100 mg capsule Take 1 capsule (100 mg total) by mouth as needed for constipation, Starting Fri 3/22/2024, No Print           Outpatient Discharge Orders   Ambulatory Referral to Cardiology   Standing Status: Future Standing Exp. Date: 07/25/26      Holter  monitor   Standing Status: Future Standing Exp. Date: 07/25/29     ED SEPSIS DOCUMENTATION   Time reflects when diagnosis was documented in both MDM as applicable and the Disposition within this note       Time User Action Codes Description Comment    7/25/2025  4:02 PM Jose Alejo Add [R00.2] Palpitations                    [1]   Past Medical History:  Diagnosis Date    ADILSON (acute kidney injury) (HCC)     Anxiety     Atrial fibrillation (HCC)     GERD (gastroesophageal reflux disease)     Hypertension     Kidney stone     Murmur, cardiac     Stress incontinence     Urethral stricture     Use of cane as ambulatory aid     as needed    Wears glasses    [2]   Past Surgical History:  Procedure Laterality Date    CARDIAC CATHETERIZATION Left 11/29/2024    Procedure: Cardiac Left Heart Cath;  Surgeon: Oskar Dsouza DO;  Location: [facility];  Service: Cardiology    CHOLECYSTECTOMY      2004    CYSTOSCOPY N/A 10/19/2021    Procedure: CYSTOSCOPY;  Surgeon: Pilo Horton MD;  Location: [facility];  Service: Urology    VT CYSTO/URETERO W/LITHOTRIPSY &INDWELL STENT INSRT Left 1/14/2022    Procedure: CYSTOSCOPY, left URETEROSCOPY, removal of migrated stent, insertion new left ureteral stent placement ;  Surgeon: Ayush Holland MD;  Location: [facility];  Service: Urology    VT CYSTO/URETERO W/LITHOTRIPSY &INDWELL STENT INSRT Left 2/23/2022    Procedure: CYSTOSCOPY, URETEROSCOPY, BASKET EXTRACTION, STENT EXCHANGE LEFT URETER;  Surgeon: Ayush Holland MD;  Location: [facility];  Service: Urology    VT CYSTOURETHROSCOPY W/INTERNAL URETHROTOMY N/A 10/19/2021    Procedure: DIRECT VISUAL INTERAL URETHROTOMY (DVIU);  Surgeon: Pilo Horton MD;  Location: [facility];  Service: Urology    VT CYSTOURETHROSCOPY W/URETERAL CATHETERIZATION Left 12/29/2021    Procedure: CYSTOSCOPY RETROGRADE PYELOGRAM WITH INSERTION STENT URETERAL LEFT, DVIU;  Surgeon: Ayush Holland MD;  Location: [facility];   Service: Urology   [3] No family history on file.  [4]   Social History  Tobacco Use    Smoking status: Former     Types: Cigarettes    Smokeless tobacco: Never   Vaping Use    Vaping status: Never Used   Substance Use Topics    Alcohol use: Not Currently    Drug use: Never        Jose Alejo PA-C  07/25/25 [id number]

## 2025-07-28 ENCOUNTER — TELEPHONE (OUTPATIENT)
Age: 45
End: 2025-07-28

## 2025-08-07 ENCOUNTER — HOSPITAL ENCOUNTER (EMERGENCY)
Facility: HOSPITAL | Age: 45
Discharge: HOME | End: 2025-08-07
Attending: EMERGENCY MEDICINE
Payer: COMMERCIAL

## 2025-08-07 ENCOUNTER — APPOINTMENT (EMERGENCY)
Dept: RADIOLOGY | Facility: HOSPITAL | Age: 45
End: 2025-08-07
Payer: COMMERCIAL

## (undated) DEVICE — GUIDEWIRE WHOLEY HI TORQUE INTERM MOD J .035 145CM

## (undated) DEVICE — GUIDEWIRE STRGHT TIP 0.038 IN SOLO PLUS

## (undated) DEVICE — RADIFOCUS OPTITORQUE ANGIOGRAPHIC CATHETER: Brand: OPTITORQUE

## (undated) DEVICE — HEYMAN DILATOR 12 FR

## (undated) DEVICE — DGW .025 FC J3MM 260CM TEF: Brand: EMERALD

## (undated) DEVICE — LUBRICANT SURGILUBE TUBE 4 OZ  FLIP TOP

## (undated) DEVICE — INVIEW CLEAR LEGGINGS: Brand: CONVERTORS

## (undated) DEVICE — BAG URINE DRAINAGE 2000ML ANTI RFLX LF

## (undated) DEVICE — DGW .035 FC J3MM 260CM TEF: Brand: EMERALD

## (undated) DEVICE — GLOVE SRG BIOGEL 8

## (undated) DEVICE — TR BAND RADIAL ARTERY COMPRESSION DEVICE: Brand: TR BAND

## (undated) DEVICE — SPECIMEN CONTAINER STERILE PEEL PACK

## (undated) DEVICE — CYSTOSCOPY PACK: Brand: CONVERTORS

## (undated) DEVICE — POUCH UR CATCHER STERILE

## (undated) DEVICE — HEYMAN DILATOR 18 FR

## (undated) DEVICE — MICROPUNCTURE INTRODUCER SET SILHOUETTE TRANSITIONLESS PUSH-PLUS DESIGN - STIFFENED CANNULA WITH NITINOL WIRE GUIDE: Brand: MICROPUNCTURE

## (undated) DEVICE — Device

## (undated) DEVICE — CHLORHEXIDINE 4PCT 4 OZ

## (undated) DEVICE — BASKET STONE RTRVL 4 WIRE HELICAL

## (undated) DEVICE — CATH DIAG 5FR IMPULSE 100CM MPA-1

## (undated) DEVICE — HEYMAN DILATOR 16 FR

## (undated) DEVICE — CATH DIAG 5FR IMPULSE 100CM WR

## (undated) DEVICE — PINNACLE INTRODUCER SHEATH: Brand: PINNACLE

## (undated) DEVICE — HEYMAN DILATOR 22 FR

## (undated) DEVICE — SEAL BIOPSY PORT ADJUST F/ACCESSORIES UP TO 3FR

## (undated) DEVICE — GUIDEWIRE STRGHT TIP 0.035 IN  SOLO PLUS

## (undated) DEVICE — CATH GUIDE LAUNCHER 5FR EBU 3.5

## (undated) DEVICE — PACK TUR

## (undated) DEVICE — CATH DIAG 5FR IMPULSE 100CM AR1

## (undated) DEVICE — GLIDESHEATH BASIC HYDROPHILIC COATED INTRODUCER SHEATH: Brand: GLIDESHEATH

## (undated) DEVICE — UROCATCH BAG

## (undated) DEVICE — HEYMAN DILATOR 14 FR

## (undated) DEVICE — CATH SECURE FOLEY

## (undated) DEVICE — CATH DIAG 5FR .045 100CM FR4

## (undated) DEVICE — HEYMAN DILATOR 10 FR

## (undated) DEVICE — NGAGE, NITINOL STONE EXTRACTOR: Brand: NGAGE

## (undated) DEVICE — GLOVE SRG BIOGEL 7

## (undated) DEVICE — KNIFE,COLD,STERILE, SINGLE USE: Brand: N.A.

## (undated) DEVICE — HEYMAN DILATOR 20 FR

## (undated) DEVICE — TOWEL SURG XR DETECT GREEN STRL RFD

## (undated) DEVICE — PREMIUM DRY TRAY LF: Brand: MEDLINE INDUSTRIES, INC.

## (undated) DEVICE — GUIDEWIRE AMPLATZ SS .038 180CM

## (undated) DEVICE — CATH DIAG 5FR IMPULSE 110CM PIG

## (undated) DEVICE — STERILE SURGICAL LUBRICANT,  TUBE: Brand: SURGILUBE

## (undated) DEVICE — HEYMAN DILATOR 24 FR

## (undated) DEVICE — CATH DIAG 5FR IMPULSE 100CM FL4

## (undated) DEVICE — CATH FOLEY COUNCIL 18FR 5ML 2 WAY LUBRICATH